# Patient Record
Sex: MALE | Race: WHITE | NOT HISPANIC OR LATINO | Employment: FULL TIME | URBAN - METROPOLITAN AREA
[De-identification: names, ages, dates, MRNs, and addresses within clinical notes are randomized per-mention and may not be internally consistent; named-entity substitution may affect disease eponyms.]

---

## 2017-04-26 LAB — HBA1C MFR BLD: 7.8 % (ref 4.8–5.6)

## 2017-12-27 LAB — HBA1C MFR BLD: 8.6 % (ref 4.8–5.6)

## 2018-04-03 ENCOUNTER — TRANSFERRED RECORDS (OUTPATIENT)
Dept: HEALTH INFORMATION MANAGEMENT | Facility: CLINIC | Age: 63
End: 2018-04-03

## 2018-04-03 LAB — HBA1C MFR BLD: 10.5 % (ref 4.8–5.6)

## 2018-04-17 ENCOUNTER — HOSPITAL ENCOUNTER (INPATIENT)
Facility: CLINIC | Age: 63
LOS: 14 days | Discharge: HOME | DRG: 885 | End: 2018-05-01
Attending: PSYCHIATRY & NEUROLOGY | Admitting: PSYCHIATRY & NEUROLOGY
Payer: COMMERCIAL

## 2018-04-17 DIAGNOSIS — F33.2 SEVERE RECURRENT MAJOR DEPRESSION WITHOUT PSYCHOTIC FEATURES (H): Primary | ICD-10-CM

## 2018-04-17 DIAGNOSIS — F32.A ANXIETY AND DEPRESSION: ICD-10-CM

## 2018-04-17 DIAGNOSIS — F41.9 ANXIETY AND DEPRESSION: ICD-10-CM

## 2018-04-17 DIAGNOSIS — E11.8 TYPE 2 DIABETES MELLITUS WITH COMPLICATION, WITHOUT LONG-TERM CURRENT USE OF INSULIN (H): ICD-10-CM

## 2018-04-17 LAB
ANION GAP SERPL CALCULATED.3IONS-SCNC: 11 MMOL/L (ref 3–14)
BASOPHILS # BLD AUTO: 0 10E9/L (ref 0–0.2)
BASOPHILS NFR BLD AUTO: 0.2 %
BUN SERPL-MCNC: 7 MG/DL (ref 7–30)
CALCIUM SERPL-MCNC: 9 MG/DL (ref 8.5–10.1)
CHLORIDE SERPL-SCNC: 101 MMOL/L (ref 94–109)
CO2 SERPL-SCNC: 23 MMOL/L (ref 20–32)
CREAT SERPL-MCNC: 0.66 MG/DL (ref 0.66–1.25)
DIFFERENTIAL METHOD BLD: ABNORMAL
EOSINOPHIL # BLD AUTO: 0.1 10E9/L (ref 0–0.7)
EOSINOPHIL NFR BLD AUTO: 0.5 %
ERYTHROCYTE [DISTWIDTH] IN BLOOD BY AUTOMATED COUNT: 12.7 % (ref 10–15)
GFR SERPL CREATININE-BSD FRML MDRD: >90 ML/MIN/1.7M2
GLUCOSE SERPL-MCNC: 233 MG/DL (ref 70–99)
HBA1C MFR BLD: 9.9 % (ref 0–6.4)
HCT VFR BLD AUTO: 44.7 % (ref 40–53)
HGB BLD-MCNC: 16.2 G/DL (ref 13.3–17.7)
IMM GRANULOCYTES # BLD: 0 10E9/L (ref 0–0.4)
IMM GRANULOCYTES NFR BLD: 0.3 %
LYMPHOCYTES # BLD AUTO: 2.2 10E9/L (ref 0.8–5.3)
LYMPHOCYTES NFR BLD AUTO: 15.5 %
MCH RBC QN AUTO: 32.3 PG (ref 26.5–33)
MCHC RBC AUTO-ENTMCNC: 36.2 G/DL (ref 31.5–36.5)
MCV RBC AUTO: 89 FL (ref 78–100)
MONOCYTES # BLD AUTO: 1 10E9/L (ref 0–1.3)
MONOCYTES NFR BLD AUTO: 6.9 %
NEUTROPHILS # BLD AUTO: 10.7 10E9/L (ref 1.6–8.3)
NEUTROPHILS NFR BLD AUTO: 76.6 %
NRBC # BLD AUTO: 0 10*3/UL
NRBC BLD AUTO-RTO: 0 /100
PLATELET # BLD AUTO: 229 10E9/L (ref 150–450)
POTASSIUM SERPL-SCNC: 4.1 MMOL/L (ref 3.4–5.3)
RBC # BLD AUTO: 5.02 10E12/L (ref 4.4–5.9)
SODIUM SERPL-SCNC: 135 MMOL/L (ref 133–144)
TSH SERPL DL<=0.005 MIU/L-ACNC: 3.31 MU/L (ref 0.4–4)
WBC # BLD AUTO: 14 10E9/L (ref 4–11)

## 2018-04-17 PROCEDURE — 83036 HEMOGLOBIN GLYCOSYLATED A1C: CPT | Performed by: PSYCHIATRY & NEUROLOGY

## 2018-04-17 PROCEDURE — 84443 ASSAY THYROID STIM HORMONE: CPT | Performed by: PSYCHIATRY & NEUROLOGY

## 2018-04-17 PROCEDURE — 25000132 ZZH RX MED GY IP 250 OP 250 PS 637: Performed by: INTERNAL MEDICINE

## 2018-04-17 PROCEDURE — 93010 ELECTROCARDIOGRAM REPORT: CPT | Performed by: INTERNAL MEDICINE

## 2018-04-17 PROCEDURE — 12400006 ZZH R&B MH INTERMEDIATE

## 2018-04-17 PROCEDURE — 80048 BASIC METABOLIC PNL TOTAL CA: CPT | Performed by: PSYCHIATRY & NEUROLOGY

## 2018-04-17 PROCEDURE — 85025 COMPLETE CBC W/AUTO DIFF WBC: CPT | Performed by: PSYCHIATRY & NEUROLOGY

## 2018-04-17 PROCEDURE — 25000132 ZZH RX MED GY IP 250 OP 250 PS 637: Performed by: PSYCHIATRY & NEUROLOGY

## 2018-04-17 PROCEDURE — 93005 ELECTROCARDIOGRAM TRACING: CPT

## 2018-04-17 PROCEDURE — 36415 COLL VENOUS BLD VENIPUNCTURE: CPT | Performed by: PSYCHIATRY & NEUROLOGY

## 2018-04-17 RX ORDER — QUETIAPINE FUMARATE 200 MG/1
200-400 TABLET, FILM COATED ORAL AT BEDTIME
Status: DISCONTINUED | OUTPATIENT
Start: 2018-04-17 | End: 2018-05-01 | Stop reason: HOSPADM

## 2018-04-17 RX ORDER — LEVOTHYROXINE SODIUM 75 UG/1
75 TABLET ORAL DAILY
Status: DISCONTINUED | OUTPATIENT
Start: 2018-04-18 | End: 2018-05-01 | Stop reason: HOSPADM

## 2018-04-17 RX ORDER — LIRAGLUTIDE 6 MG/ML
1.6 INJECTION SUBCUTANEOUS DAILY
Status: ON HOLD | COMMUNITY
End: 2018-04-19

## 2018-04-17 RX ORDER — ONDANSETRON 4 MG/1
4 TABLET, ORALLY DISINTEGRATING ORAL EVERY 8 HOURS PRN
Status: DISCONTINUED | OUTPATIENT
Start: 2018-04-17 | End: 2018-05-01 | Stop reason: HOSPADM

## 2018-04-17 RX ORDER — IRBESARTAN 150 MG/1
150 TABLET ORAL DAILY
Status: DISCONTINUED | OUTPATIENT
Start: 2018-04-18 | End: 2018-05-01 | Stop reason: HOSPADM

## 2018-04-17 RX ORDER — NICOTINE POLACRILEX 4 MG
15-30 LOZENGE BUCCAL
Status: DISCONTINUED | OUTPATIENT
Start: 2018-04-17 | End: 2018-05-01 | Stop reason: HOSPADM

## 2018-04-17 RX ORDER — ATORVASTATIN CALCIUM 40 MG/1
40 TABLET, FILM COATED ORAL DAILY
Status: DISCONTINUED | OUTPATIENT
Start: 2018-04-18 | End: 2018-05-01 | Stop reason: HOSPADM

## 2018-04-17 RX ORDER — MIRTAZAPINE 45 MG/1
45 TABLET, FILM COATED ORAL AT BEDTIME
Status: DISCONTINUED | OUTPATIENT
Start: 2018-04-17 | End: 2018-05-01 | Stop reason: HOSPADM

## 2018-04-17 RX ORDER — PAROXETINE 20 MG/1
20 TABLET, FILM COATED ORAL DAILY
Status: DISCONTINUED | OUTPATIENT
Start: 2018-04-18 | End: 2018-04-24

## 2018-04-17 RX ORDER — ESZOPICLONE 3 MG/1
3 TABLET, FILM COATED ORAL AT BEDTIME
Status: DISCONTINUED | OUTPATIENT
Start: 2018-04-17 | End: 2018-05-01 | Stop reason: HOSPADM

## 2018-04-17 RX ORDER — TRAZODONE HYDROCHLORIDE 50 MG/1
50 TABLET, FILM COATED ORAL AT BEDTIME
Status: DISCONTINUED | OUTPATIENT
Start: 2018-04-17 | End: 2018-05-01 | Stop reason: HOSPADM

## 2018-04-17 RX ORDER — DEXTROSE MONOHYDRATE 25 G/50ML
25-50 INJECTION, SOLUTION INTRAVENOUS
Status: DISCONTINUED | OUTPATIENT
Start: 2018-04-17 | End: 2018-05-01 | Stop reason: HOSPADM

## 2018-04-17 RX ORDER — PHENOBARBITAL 32.4 MG/1
32.4 TABLET ORAL ONCE
Status: COMPLETED | OUTPATIENT
Start: 2018-04-17 | End: 2018-04-17

## 2018-04-17 RX ORDER — IBUPROFEN 400 MG/1
800 TABLET, FILM COATED ORAL 3 TIMES DAILY PRN
Status: DISCONTINUED | OUTPATIENT
Start: 2018-04-17 | End: 2018-05-01 | Stop reason: HOSPADM

## 2018-04-17 RX ORDER — POLYETHYLENE GLYCOL 3350 17 G
4-8 POWDER IN PACKET (EA) ORAL
Status: DISCONTINUED | OUTPATIENT
Start: 2018-04-17 | End: 2018-05-01 | Stop reason: HOSPADM

## 2018-04-17 RX ADMIN — PHENOBARBITAL 32.4 MG: 32.4 TABLET ORAL at 22:46

## 2018-04-17 RX ADMIN — MIRTAZAPINE 45 MG: 45 TABLET, FILM COATED ORAL at 23:43

## 2018-04-17 RX ADMIN — TRAZODONE HYDROCHLORIDE 50 MG: 50 TABLET ORAL at 23:43

## 2018-04-17 RX ADMIN — ESZOPICLONE 3 MG: 3 TABLET, FILM COATED ORAL at 23:43

## 2018-04-17 RX ADMIN — METFORMIN HYDROCHLORIDE 1000 MG: 500 TABLET, FILM COATED ORAL at 23:43

## 2018-04-17 RX ADMIN — QUETIAPINE FUMARATE 400 MG: 200 TABLET ORAL at 23:43

## 2018-04-17 ASSESSMENT — ACTIVITIES OF DAILY LIVING (ADL)
GROOMING: INDEPENDENT
LAUNDRY: WITH SUPERVISION
DRESS: SCRUBS (BEHAVIORAL HEALTH)
ORAL_HYGIENE: INDEPENDENT

## 2018-04-17 NOTE — IP AVS SNAPSHOT
[place] [address] [place] [address]    Phone:  [phone]                                       After Visit Summary   4/17/2018    Tanner Villatoro    MRN: [id number]           After Visit Summary Signature Page     I have received my discharge instructions, and my questions have been answered. I have discussed any challenges I see with this plan with the nurse or doctor.    ..........................................................................................................................................  Patient/Patient Representative Signature      ..........................................................................................................................................  Patient Representative Print Name and Relationship to Patient    ..................................................               ................................................  Date                                            Time    ..........................................................................................................................................  Reviewed by Signature/Title    ...................................................              ..............................................  Date                                                            Time

## 2018-04-17 NOTE — IP AVS SNAPSHOT
"                  MRN:[id number]                      After Visit Summary   4/17/2018    Tanner Villatoro    MRN: [id number]           Thank you!     Thank you for choosing [facility] for your care. Our goal is always to provide you with excellent care.        Patient Information     Date Of Birth          1955        Designated Caregiver       Most Recent Value    Caregiver    Will someone help with your care after discharge? no      About your hospital stay     You were admitted on:  April 17, 2018 You last received care in the:  [facility]    You were discharged on:  May 1, 2018       Who to Call     For medical emergencies, please call 911.  For non-urgent questions about your medical care, please call your primary care provider or clinic, [phone]          Attending Provider     Provider Specialty    Irving Pappas MD Psychiatry       Primary Care Provider Office Phone # Fax #    Shaun Guthrie [phone]      Follow-up Appointments     Follow-up and recommended labs and tests        Call to schedule follow up with primary care provider, Shaun Guthrie, to occur within 1 month of discharge to review diabetes medications and assess whether increased dose or additional agent needed. Remind provider that you need \"sliding scale insulin\" while hospitalized despite being on Victoza and metformin.  Follow up with the dentist as soon as possible regarding possible dental infection.    Consider PFT (pulmonary function tests) and Sleep study to assess for possible obstructive sleep apnea and COPD                  Further instructions from your care team       Call to schedule follow up appointment with Dr. Guthrie- phone # [phone]-Any referral should go through your primary care doctor within 1 month of hospital discharge in order to discuss diabetes management. Inform that you required supplemental insulin while hospitalized despite continuing metformin and " Victoza. Metformin has been increased to bid. Please inform your Primary of this and keep track of your blood sugars and relay this to Dr. Guthrie    Also recommended is that you schedule a sleep study and Pulmonary FunctionTest as outpatient as outpatient following bout with oxygenation difficulties. Referral would need to go through Dr. Guthrie (see above). Number for [facility] is .    Behavioral Discharge Planning and Instructions    Summary: Admitted to hospital with worsening anxiety and depression and inability to function in everyday life    Main Diagnosis: Major depression; Panic disorder without agoraphobia; Sedative dependence; opiate use disorder, in remission     Major Treatments, Procedures and Findings: psychiatric assessment; Phenobarb taper from benzodiazepines; Series of electro-convulsive therapy treatments.   Final treatment to be completed as outpatient on 4/30/18. Do not drive for at least one week following your last ECT treatment. If you have lingering memory issues or impaired judgment, do not drive until you have been cleared to do so by your physician.     Symptoms to Report: mood getting worse or thoughts of suicide    Lifestyle Adjustment: Sober lifestyle recommended. Follow up with AA and get a sponsor. Intensive outpatient Program recommended to learn anxiety and depression management skills.Complete and follow safety plan.    Psychiatry Follow-up:     Dr. Pappas - appointment on Friday 5/11/18 @1:45 pm.       [facility]  [address], [address]  [place]  [address]  Phone:  [phone]  Fax:  [phone]    [facility] program- intake on Monday 5/7/18 with Gita Shirley. Arrive @ 8:45 for paperwork. Interview @ 9:15  [facility]  [address], [address]  [place]  [address]  Phone:  [phone]  Fax:  [phone]    Resources:     [facility] Services-   Crisis  "Intervention: 394.189.2772 or 831-820-3209 (TTY: 167.933.8903).  Call anytime for help.  [place] on Mental Illness (www.[place].foreign.org): 492.844.1298 or 200-082-2672.  Alcoholics Anonymous (www.alcoholics-anonymous.org): Check your phone book for your local chapter.  National Suicide Prevention Line (www.mentalhealthmn.org): 886-423-ALWZ (7848)    General Medication Instructions:   See your medication sheet(s) for instructions.   Take all medicines as directed.  Make no changes unless your doctor suggests them.   Go to all your doctor visits.  Be sure to have all your required lab tests. This way, your medicines can be refilled on time.  Do not use any drugs not prescribed by your doctor.  Avoid alcohol.      Pending Results     No orders found from 4/15/2018 to 4/18/2018.            Statement of Approval     Ordered          05/01/18 1053  I have reviewed and agree with all the recommendations and orders detailed in this document.  EFFECTIVE NOW     Approved and electronically signed by:  Irving Pappas MD             Admission Information     Date & Time Provider Department Dept. Phone    4/17/2018 Irving Pappas MD [facility] [phone]      Your Vitals Were     Blood Pressure Pulse Temperature Respirations Height Weight    134/82 73 98.4  F (36.9  C) (Oral) 16 1.778 m (5' 10\") 101.2 kg (223 lb 1.6 oz)    Pulse Oximetry BMI (Body Mass Index)                91% 32.01 kg/m2          Eponym Information     Eponym lets you send messages to your doctor, view your test results, renew your prescriptions, schedule appointments and more. To sign up, go to [email or web address] . Click on \"Log in\" on the left side of the screen, which will take you to the Welcome page. Then click on \"Sign up Now\" on the right side of the page.     You will be asked to enter the access code listed below, as well as some personal information. Please follow the directions to create your " username and password.     Your access code is: [email or web address]  Expires: 2018  3:21 PM     Your access code will  in 90 days. If you need help or a new code, please call your [facility] clinic or [phone].        Care EveryWhere ID     This is your Care EveryWhere ID. This could be used by other organizations to access your [facility] medical records  [id number]        Equal Access to Services     MANJU SHEETS : Hadii hemalatha wolfe hadasho Soomaali, waaxda luqadaha, qaybta kaalmada adeegyada, humberto leroyin hayaletha tsetomerluis alberto lai . So [facility] [phone].    ATENCIÓN: Si habla español, tiene a [place] disposición servicios gratuitos de asistencia lingüística. [facility] [phone].    We comply with applicable federal civil rights laws and [place] laws. We do not discriminate on the basis of race, color, national origin, age, disability, sex, sexual orientation, or gender identity.               Review of your medicines      START taking        Dose / Directions    hydrOXYzine 50 MG tablet   Commonly known as:  ATARAX   Used for:  Severe recurrent major depression without psychotic features (H)        Dose:  50 mg   Take 1 tablet (50 mg) by mouth 2 times daily as needed for anxiety   Quantity:  60 tablet   Refills:  0         CONTINUE these medicines which may have CHANGED, or have new prescriptions. If we are uncertain of the size of tablets/capsules you have at home, strength may be listed as something that might have changed.        Dose / Directions    metFORMIN 1000 MG tablet   Commonly known as:  GLUCOPHAGE   Indication:  Type 2 Diabetes   This may have changed:    - medication strength  - when to take this   Used for:  Type 2 diabetes mellitus with complication, without long-term current use of insulin (H)        Dose:  1000 mg   Take 1 tablet (1,000 mg) by mouth 2 times daily (with meals)   Quantity:  60 tablet   Refills:  0       PARoxetine 10 MG tablet   Commonly known as:  PAXIL   This may have changed:     - medication strength  - how much to take   Used for:  Severe recurrent major depression without psychotic features (H)        Dose:  10 mg   Start taking on:  5/2/2018   Take 1 tablet (10 mg) by mouth daily   Refills:  0       * SEROQUEL PO   This may have changed:  Another medication with the same name was added. Make sure you understand how and when to take each.        Dose:  200-400 mg   Take 200-400 mg by mouth At Bedtime   Refills:  0       * QUEtiapine 50 MG tablet   Commonly known as:  SEROquel   This may have changed:  You were already taking a medication with the same name, and this prescription was added. Make sure you understand how and when to take each.   Used for:  Severe recurrent major depression without psychotic features (H)        Dose:  50 mg   Take 1 tablet (50 mg) by mouth 2 times daily as needed (anxiety)   Quantity:  60 tablet   Refills:  0       VICTOZA PEN 18 MG/3ML soln   Indication:  Type 2 Diabetes   This may have changed:  how much to take   Generic drug:  liraglutide        Dose:  1.2 mg   Inject 1.2 mg Subcutaneous daily   Refills:  0       * Notice:  This list has 2 medication(s) that are the same as other medications prescribed for you. Read the directions carefully, and ask your doctor or other care provider to review them with you.      CONTINUE these medicines which have NOT CHANGED        Dose / Directions    ASPIRIN PO        Dose:  81 mg   Take 81 mg by mouth daily   Refills:  0       AVAPRO PO        Dose:  150 mg   Take 150 mg by mouth daily   Refills:  0       IBUPROFEN PO        Dose:  800 mg   Take 800 mg by mouth 3 times daily as needed for moderate pain   Refills:  0       LEVOTHYROXINE SODIUM PO        Dose:  75 mcg   Take 75 mcg by mouth daily.   Refills:  0       LIPITOR PO        Dose:  40 mg   Take 40 mg by mouth daily.   Refills:  0       LUNESTA PO        Dose:  3 mg   Take 3 mg by mouth At Bedtime   Refills:  0       REMERON PO        Dose:  45 mg   Take 45  mg by mouth At Bedtime   Refills:  0       TRAZODONE HCL PO        Dose:  50 mg   Take 50 mg by mouth At Bedtime   Refills:  0       TRINTELLIX 20 MG tablet   Generic drug:  vortioxetine        Dose:  20 mg   Take 20 mg by mouth daily   Refills:  0       ZOFRAN ODT PO        Dose:  4 mg   Take 4 mg by mouth every 8 hours as needed for nausea or vomiting   Refills:  0            Where to get your medicines      These medications were sent to [facility] - [address]  [address], [place] [address]     Phone:  [phone]     hydrOXYzine 50 MG tablet    metFORMIN 1000 MG tablet    QUEtiapine 50 MG tablet         Some of these will need a paper prescription and others can be bought over the counter. Ask your nurse if you have questions.     You don't need a prescription for these medications     PARoxetine 10 MG tablet                Protect others around you: Learn how to safely use, store and throw away your medicines at www.disposemymeds.org.             Medication List: This is a list of all your medications and when to take them. Check marks below indicate your daily home schedule. Keep this list as a reference.      Medications           Morning Afternoon Evening Bedtime As Needed    ASPIRIN PO   Take 81 mg by mouth daily                                AVAPRO PO   Take 150 mg by mouth daily   Last time this was given:  150 mg on 5/1/2018  8:38 AM                                   hydrOXYzine 50 MG tablet   Commonly known as:  ATARAX   Take 1 tablet (50 mg) by mouth 2 times daily as needed for anxiety   Last time this was given:  50 mg on 5/1/2018  1:34 PM                                   IBUPROFEN PO   Take 800 mg by mouth 3 times daily as needed for moderate pain   Last time this was given:  800 mg on 5/1/2018  1:33 PM                                   LEVOTHYROXINE SODIUM PO   Take 75 mcg by mouth daily.   Last time this was given:  75 mcg on 5/1/2018  8:38 AM                                    LIPITOR PO   Take 40 mg by mouth daily.   Last time this was given:  40 mg on 5/1/2018  8:38 AM                                   LUNESTA PO   Take 3 mg by mouth At Bedtime   Last time this was given:  3 mg on 4/30/2018  9:15 PM                                   metFORMIN 1000 MG tablet   Commonly known as:  GLUCOPHAGE   Take 1 tablet (1,000 mg) by mouth 2 times daily (with meals)   Last time this was given:  1,000 mg on 5/1/2018  8:38 AM                                PARoxetine 10 MG tablet   Commonly known as:  PAXIL   Take 1 tablet (10 mg) by mouth daily   Start taking on:  5/2/2018   Last time this was given:  10 mg on 5/1/2018  8:38 AM                                   REMERON PO   Take 45 mg by mouth At Bedtime   Last time this was given:  45 mg on 4/30/2018  9:15 PM                                   * SEROQUEL PO   Take 200-400 mg by mouth At Bedtime   Last time this was given:  50 mg on 5/1/2018  1:33 PM                                * QUEtiapine 50 MG tablet   Commonly known as:  SEROquel   Take 1 tablet (50 mg) by mouth 2 times daily as needed (anxiety)   Last time this was given:  50 mg on 5/1/2018  1:33 PM                                   TRAZODONE HCL PO   Take 50 mg by mouth At Bedtime   Last time this was given:  50 mg on 4/30/2018  9:15 PM                                   TRINTELLIX 20 MG tablet   Take 20 mg by mouth daily   Last time this was given:  20 mg on 5/1/2018  8:38 AM   Generic drug:  vortioxetine                                   VICTOZA PEN 18 MG/3ML soln   Inject 1.2 mg Subcutaneous daily   Last time this was given:  1.2 mg on 4/30/2018  5:18 PM   Generic drug:  liraglutide                                   ZOFRAN ODT PO   Take 4 mg by mouth every 8 hours as needed for nausea or vomiting                                   * Notice:  This list has 2 medication(s) that are the same as other medications prescribed for you. Read the directions carefully, and  ask your doctor or other care provider to review them with you.

## 2018-04-18 ENCOUNTER — ANESTHESIA (OUTPATIENT)
Dept: SURGERY | Facility: CLINIC | Age: 63
End: 2018-04-18

## 2018-04-18 ENCOUNTER — ANESTHESIA EVENT (OUTPATIENT)
Dept: SURGERY | Facility: CLINIC | Age: 63
End: 2018-04-18

## 2018-04-18 LAB
GLUCOSE BLDC GLUCOMTR-MCNC: 163 MG/DL (ref 70–99)
GLUCOSE BLDC GLUCOMTR-MCNC: 204 MG/DL (ref 70–99)
GLUCOSE BLDC GLUCOMTR-MCNC: 230 MG/DL (ref 70–99)
GLUCOSE BLDC GLUCOMTR-MCNC: 258 MG/DL (ref 70–99)
GLUCOSE BLDC GLUCOMTR-MCNC: 284 MG/DL (ref 70–99)
GLUCOSE BLDC GLUCOMTR-MCNC: 339 MG/DL (ref 70–99)

## 2018-04-18 PROCEDURE — 25000128 H RX IP 250 OP 636: Performed by: NURSE ANESTHETIST, CERTIFIED REGISTERED

## 2018-04-18 PROCEDURE — 99221 1ST HOSP IP/OBS SF/LOW 40: CPT | Performed by: NURSE PRACTITIONER

## 2018-04-18 PROCEDURE — 25000128 H RX IP 250 OP 636: Performed by: SURGERY

## 2018-04-18 PROCEDURE — 25000128 H RX IP 250 OP 636: Performed by: PSYCHIATRY & NEUROLOGY

## 2018-04-18 PROCEDURE — 90870 ELECTROCONVULSIVE THERAPY: CPT

## 2018-04-18 PROCEDURE — 37000008 ZZH ANESTHESIA TECHNICAL FEE, 1ST 30 MIN

## 2018-04-18 PROCEDURE — 25000132 ZZH RX MED GY IP 250 OP 250 PS 637: Performed by: PSYCHIATRY & NEUROLOGY

## 2018-04-18 PROCEDURE — 25000125 ZZHC RX 250: Performed by: NURSE ANESTHETIST, CERTIFIED REGISTERED

## 2018-04-18 PROCEDURE — 40000010 ZZH STATISTIC ANES STAT CODE-CRNA PER MINUTE

## 2018-04-18 PROCEDURE — 25000131 ZZH RX MED GY IP 250 OP 636 PS 637: Performed by: INTERNAL MEDICINE

## 2018-04-18 PROCEDURE — 25000125 ZZHC RX 250: Performed by: SURGERY

## 2018-04-18 PROCEDURE — 25000132 ZZH RX MED GY IP 250 OP 250 PS 637: Performed by: INTERNAL MEDICINE

## 2018-04-18 PROCEDURE — 90853 GROUP PSYCHOTHERAPY: CPT

## 2018-04-18 PROCEDURE — 99207 ZZC CDG-HISTORY COMP: MEETS EXP. PROB FOCUSED- DOWN CODED LACK OF PFSH: CPT | Performed by: NURSE PRACTITIONER

## 2018-04-18 PROCEDURE — 25000132 ZZH RX MED GY IP 250 OP 250 PS 637: Performed by: HOSPITALIST

## 2018-04-18 PROCEDURE — 00000146 ZZHCL STATISTIC GLUCOSE BY METER IP

## 2018-04-18 PROCEDURE — 12400006 ZZH R&B MH INTERMEDIATE

## 2018-04-18 PROCEDURE — 25000131 ZZH RX MED GY IP 250 OP 636 PS 637: Performed by: NURSE ANESTHETIST, CERTIFIED REGISTERED

## 2018-04-18 RX ORDER — ONDANSETRON 4 MG/1
4 TABLET, ORALLY DISINTEGRATING ORAL EVERY 30 MIN PRN
Status: DISCONTINUED | OUTPATIENT
Start: 2018-04-18 | End: 2018-04-18 | Stop reason: HOSPADM

## 2018-04-18 RX ORDER — KETOROLAC TROMETHAMINE 30 MG/ML
30 INJECTION, SOLUTION INTRAMUSCULAR; INTRAVENOUS EVERY 6 HOURS PRN
Status: DISCONTINUED | OUTPATIENT
Start: 2018-04-18 | End: 2018-04-23

## 2018-04-18 RX ORDER — SODIUM CHLORIDE, SODIUM LACTATE, POTASSIUM CHLORIDE, CALCIUM CHLORIDE 600; 310; 30; 20 MG/100ML; MG/100ML; MG/100ML; MG/100ML
INJECTION, SOLUTION INTRAVENOUS CONTINUOUS
Status: DISCONTINUED | OUTPATIENT
Start: 2018-04-18 | End: 2018-04-18 | Stop reason: HOSPADM

## 2018-04-18 RX ORDER — NALOXONE HYDROCHLORIDE 0.4 MG/ML
.1-.4 INJECTION, SOLUTION INTRAMUSCULAR; INTRAVENOUS; SUBCUTANEOUS
Status: DISCONTINUED | OUTPATIENT
Start: 2018-04-18 | End: 2018-04-18 | Stop reason: HOSPADM

## 2018-04-18 RX ORDER — LIRAGLUTIDE 6 MG/ML
1.6 INJECTION SUBCUTANEOUS DAILY
Status: DISCONTINUED | OUTPATIENT
Start: 2018-04-18 | End: 2018-04-18

## 2018-04-18 RX ORDER — PHENOBARBITAL 32.4 MG/1
32.4 TABLET ORAL 3 TIMES DAILY
Status: DISCONTINUED | OUTPATIENT
Start: 2018-04-18 | End: 2018-04-23

## 2018-04-18 RX ORDER — ONDANSETRON 2 MG/ML
4 INJECTION INTRAMUSCULAR; INTRAVENOUS EVERY 30 MIN PRN
Status: DISCONTINUED | OUTPATIENT
Start: 2018-04-18 | End: 2018-04-18 | Stop reason: HOSPADM

## 2018-04-18 RX ORDER — ESMOLOL HYDROCHLORIDE 10 MG/ML
INJECTION INTRAVENOUS PRN
Status: DISCONTINUED | OUTPATIENT
Start: 2018-04-18 | End: 2018-04-18

## 2018-04-18 RX ORDER — MEPERIDINE HYDROCHLORIDE 25 MG/ML
12.5 INJECTION INTRAMUSCULAR; INTRAVENOUS; SUBCUTANEOUS
Status: DISCONTINUED | OUTPATIENT
Start: 2018-04-18 | End: 2018-04-18 | Stop reason: HOSPADM

## 2018-04-18 RX ORDER — LABETALOL HYDROCHLORIDE 5 MG/ML
INJECTION, SOLUTION INTRAVENOUS PRN
Status: DISCONTINUED | OUTPATIENT
Start: 2018-04-18 | End: 2018-04-18

## 2018-04-18 RX ORDER — SODIUM CHLORIDE, SODIUM LACTATE, POTASSIUM CHLORIDE, CALCIUM CHLORIDE 600; 310; 30; 20 MG/100ML; MG/100ML; MG/100ML; MG/100ML
INJECTION, SOLUTION INTRAVENOUS ONCE
Status: COMPLETED | OUTPATIENT
Start: 2018-04-18 | End: 2018-04-18

## 2018-04-18 RX ADMIN — ATORVASTATIN CALCIUM 40 MG: 40 TABLET, FILM COATED ORAL at 07:58

## 2018-04-18 RX ADMIN — NICOTINE POLACRILEX 8 MG: 4 LOZENGE ORAL at 16:50

## 2018-04-18 RX ADMIN — ESMOLOL HYDROCHLORIDE 20 MG: 10 INJECTION, SOLUTION INTRAVENOUS at 06:57

## 2018-04-18 RX ADMIN — INSULIN ASPART 3 UNITS: 100 INJECTION, SOLUTION INTRAVENOUS; SUBCUTANEOUS at 09:27

## 2018-04-18 RX ADMIN — VORTIOXETINE 20 MG: 20 TABLET, FILM COATED ORAL at 07:58

## 2018-04-18 RX ADMIN — MIRTAZAPINE 45 MG: 45 TABLET, FILM COATED ORAL at 22:00

## 2018-04-18 RX ADMIN — IBUPROFEN 800 MG: 400 TABLET ORAL at 18:07

## 2018-04-18 RX ADMIN — IBUPROFEN 800 MG: 400 TABLET ORAL at 12:57

## 2018-04-18 RX ADMIN — KETOROLAC TROMETHAMINE 30 MG: 30 INJECTION, SOLUTION INTRAMUSCULAR at 07:23

## 2018-04-18 RX ADMIN — INSULIN ASPART 2 UNITS: 100 INJECTION, SOLUTION INTRAVENOUS; SUBCUTANEOUS at 17:35

## 2018-04-18 RX ADMIN — LEVOTHYROXINE SODIUM 75 MCG: 75 TABLET ORAL at 07:58

## 2018-04-18 RX ADMIN — SODIUM CHLORIDE, POTASSIUM CHLORIDE, SODIUM LACTATE AND CALCIUM CHLORIDE: 600; 310; 30; 20 INJECTION, SOLUTION INTRAVENOUS at 06:17

## 2018-04-18 RX ADMIN — METFORMIN HYDROCHLORIDE 1000 MG: 500 TABLET, FILM COATED ORAL at 17:35

## 2018-04-18 RX ADMIN — PHENOBARBITAL 32.4 MG: 32.4 TABLET ORAL at 21:05

## 2018-04-18 RX ADMIN — NICOTINE POLACRILEX 8 MG: 4 LOZENGE ORAL at 11:28

## 2018-04-18 RX ADMIN — NICOTINE POLACRILEX 8 MG: 4 LOZENGE ORAL at 20:03

## 2018-04-18 RX ADMIN — Medication 100 MG: at 06:50

## 2018-04-18 RX ADMIN — PHENOBARBITAL 32.4 MG: 32.4 TABLET ORAL at 15:52

## 2018-04-18 RX ADMIN — QUETIAPINE FUMARATE 400 MG: 200 TABLET ORAL at 22:00

## 2018-04-18 RX ADMIN — PAROXETINE HYDROCHLORIDE 20 MG: 20 TABLET, FILM COATED ORAL at 07:58

## 2018-04-18 RX ADMIN — SUCCINYLCHOLINE CHLORIDE 100 MG: 20 INJECTION, SOLUTION INTRAMUSCULAR; INTRAVENOUS; PARENTERAL at 06:50

## 2018-04-18 RX ADMIN — TRAZODONE HYDROCHLORIDE 50 MG: 50 TABLET ORAL at 22:00

## 2018-04-18 RX ADMIN — IRBESARTAN 150 MG: 150 TABLET ORAL at 07:58

## 2018-04-18 RX ADMIN — NICOTINE POLACRILEX 4 MG: 4 LOZENGE ORAL at 18:09

## 2018-04-18 RX ADMIN — NICOTINE POLACRILEX 8 MG: 4 LOZENGE ORAL at 15:00

## 2018-04-18 RX ADMIN — ESZOPICLONE 3 MG: 3 TABLET, FILM COATED ORAL at 22:00

## 2018-04-18 RX ADMIN — LABETALOL HYDROCHLORIDE 15 MG: 5 INJECTION INTRAVENOUS at 06:55

## 2018-04-18 RX ADMIN — SODIUM CHLORIDE, POTASSIUM CHLORIDE, SODIUM LACTATE AND CALCIUM CHLORIDE: 600; 310; 30; 20 INJECTION, SOLUTION INTRAVENOUS at 06:48

## 2018-04-18 RX ADMIN — NICOTINE POLACRILEX 8 MG: 4 LOZENGE ORAL at 22:00

## 2018-04-18 RX ADMIN — INSULIN ASPART 4 UNITS: 100 INJECTION, SOLUTION INTRAVENOUS; SUBCUTANEOUS at 12:25

## 2018-04-18 RX ADMIN — AMOXICILLIN AND CLAVULANATE POTASSIUM 1 TABLET: 875; 125 TABLET, FILM COATED ORAL at 07:58

## 2018-04-18 RX ADMIN — LIDOCAINE HYDROCHLORIDE 0.5 MG: 10 INJECTION, SOLUTION EPIDURAL; INFILTRATION; INTRACAUDAL; PERINEURAL at 06:17

## 2018-04-18 RX ADMIN — NICOTINE POLACRILEX 8 MG: 4 LOZENGE ORAL at 21:06

## 2018-04-18 RX ADMIN — AMOXICILLIN AND CLAVULANATE POTASSIUM 1 TABLET: 875; 125 TABLET, FILM COATED ORAL at 21:05

## 2018-04-18 RX ADMIN — NICOTINE POLACRILEX 8 MG: 4 LOZENGE ORAL at 13:02

## 2018-04-18 ASSESSMENT — LIFESTYLE VARIABLES: TOBACCO_USE: 1

## 2018-04-18 NOTE — H&P
[facility]    History and Physical  Hospitalist       Date of Admission:  4/17/2018    Assessment & Plan   Tanner Villatoro is a 63 year old male who presents with increasing anxiety, and depression. The patient has a PMH of depression, anxiety, substance abuse, HTN, DM2, HLD and hypothyroidism.    Depression  Anxiety  History of Substance Abuse: The patient is currently treated by Dr. Pappas and was directly admitted from his clinic for worsening depression and anxiety. He has a history of suicide attempts, but denies a plan or SI now. He has a history of opioid addiction, but has been weaned off using lorazepam by his PCP and has not had opiates in 6-7 weeks. History of ETOH abuse but has not had a drink since 1988. 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 PTA medications include Lunesta, Remeron, Paxil, Seroquel, Trintellix and trazodone. The plan is for ECT treatment on April 19, 2018.   -- Managed per primary team    Hypertension: Well controlled by PCP on Irbesartan only. BP on admission to the hospital is slightly elevated at 144/98. Per nursing staff, the patient was anxious and tearful on arrival. I recommend this be retaken prior to ECT. EKG obtained prior to ECT, which demonstrates NSR without ST-T changes indicative of ischemia, without QT prolongation. There is TWI in lead III, however, I do not have a comparison. The patient reports having 2 coronary stents in Jan 2010. The patient denies any CP, SOB, exertional chest discomfort, peripheral edema or orthopnea.   -- Continue PTA Irbesartan    Diabetes Type II: Poorly controlled. The patient is on Victoza and Metformin, Hgb A1C is 9.9 today. Glucose here is 133. He is NPO for ECT tomorrow AM. Renal function WNL. The patietn denies any hypoglycemic episodes.  -- Continue QID glucose monitoring with sliding scale  -- Continue metformin for now     Hypothyroidism: Chronic and stable on  levothyroxine 75mcg daily. TSH today 3.31.  -- continue levothyroxine    Leukocytosis with Neutrophilia: The patient arrives with a WBC of 14k. He denies any recent URIs, but endorses ongoing tooth tenderness, and fragility for one year. He has been following with his dentist and has numerous teeth removed. He has an area above the left maxillary canine of tenderness, and suspects he may have a problem with this tooth. He endorses some chills recently, but feels overall well. He is afebrile today and does not demonstrate any signs of toxicity, VS are WNL.   --Follow up with his dentist in the outpatient setting.    I feel it is appropriate for the patient to proceed with his planned ECT treatment. At this time, the Hospitalist Service will peripherally follow by chart checking the patient's glucose levels and BP.     # Pain Assessment:  Current Pain Score 5/24/2012   Pain score (0-10) 5   Pain location -   Pain descriptors Burning   - Tanner is experiencing pain due to chronic back pain. Pain management was discussed and the plan was created in a collaborative fashion.  Tanner's response to the current recommendations: compliant  - Non-pharmacologic adjuvants: Heat and Ice      DVT Prophylaxis: Low Risk/Ambulatory with no VTE prophylaxis indicated  Code Status: Full Code    Disposition: Expected discharge in 2-3 days once cleared by primary team.      ONEYDA Johnson [facility]  Hospitalist Service  Pager: [phone] (7a - 6p)      Primary Care Physician   Shaun Guthrie    Chief Complaint   Anxiety and Depression    History is obtained from the patient    History of Present Illness   Tanner Villatoro is a 63 year old male who presents from Dr. Pappas's clinic for worsening anxiety and depression, scheduled for ECT treatment tomorrow AM.    Past Medical History    I have reviewed this patient's medical history and updated it with pertinent information if needed.   Past Medical History:   Diagnosis Date     Anxiety       Coronary artery disease     stent x 2     Diabetes mellitus (H)     type 2     Headaches      Heart attack      Hyperlipemia      Hypertension      Insomnia, unspecified      Sleep apnea        Past Surgical History   I have reviewed this patient's surgical history and updated it with pertinent information if needed.  Past Surgical History:   Procedure Laterality Date     CARDIAC SURGERY      stent x2     ENDOSCOPIC BALLOON SINUPLASTY ACCLARENT  5/24/2012    Procedure:ENDOSCOPIC BALLOON SINUPLASTY ACCLARENT; BILATERAL ENDOSCOPIC ETHMOIDECTOMY, MAXILLARY ANTROSTOMY, FRONTAL SINUSOSTOMY BILATERAL ; Surgeon:ONI CARRERA; Location:[facility]     GENITOURINARY SURGERY      vasectomy     ORTHOPEDIC SURGERY      ankle cystectomy       Prior to Admission Medications   Prior to Admission Medications   Prescriptions Last Dose Informant Patient Reported? Taking?   ASPIRIN PO 4/16/2018 at Unknown time  Yes Yes   Sig: Take 81 mg by mouth daily    Atorvastatin Calcium (LIPITOR PO) 4/16/2018 at Unknown time  Yes Yes   Sig: Take 40 mg by mouth daily.   Eszopiclone (LUNESTA PO) Past Week at Unknown time  Yes Yes   Sig: Take 3 mg by mouth At Bedtime   IBUPROFEN PO 4/16/2018 at Unknown time  Yes Yes   Sig: Take 800 mg by mouth 3 times daily as needed for moderate pain   Irbesartan (AVAPRO PO) 4/16/2018 at Unknown time  Yes Yes   Sig: Take 150 mg by mouth daily   LEVOTHYROXINE SODIUM PO 4/16/2018 at Unknown time  Yes Yes   Sig: Take 75 mcg by mouth daily.   METFORMIN HCL PO 4/16/2018 at Unknown time  Yes Yes   Sig: Take 1,000 mg by mouth daily (with dinner)    Mirtazapine (REMERON PO) 4/16/2018 at Unknown time  Yes Yes   Sig: Take 45 mg by mouth At Bedtime   Ondansetron (ZOFRAN ODT PO) Past Month at Unknown time  Yes Yes   Sig: Take 4 mg by mouth every 8 hours as needed for nausea or vomiting   PARoxetine HCl (PAXIL PO) 4/17/2018 at Unknown time  Yes Yes   Sig: Take 20 mg by mouth daily    QUEtiapine Fumarate (SEROQUEL PO) 4/16/2018  at Unknown time  Yes Yes   Sig: Take 200-400 mg by mouth At Bedtime   TRAZODONE HCL PO 4/16/2018 at Unknown time  Yes Yes   Sig: Take 50 mg by mouth At Bedtime   liraglutide (VICTOZA PEN) 18 MG/3ML soln 4/16/2018 at Unknown time Self Yes Yes   Sig: Inject 1.6 mg Subcutaneous daily   vortioxetine (TRINTELLIX) 20 MG tablet 4/17/2018 at Unknown time  Yes Yes   Sig: Take 20 mg by mouth daily      Facility-Administered Medications: None     Allergies   No Known Allergies    Social History   I have reviewed this patient's social history and updated it with pertinent information if needed. Tanner Villatoro  reports that he quit smoking about 8 years ago. He does not have any smokeless tobacco history on file. He reports that he does not drink alcohol or use illicit drugs. He has a history of opioid addiction and has not had any opiates in 6-7 weeks.     Family History   I have reviewed this patient's family history and updated it with pertinent information if needed.   No family history on file.    Review of Systems   CONSTITUTIONAL: NEGATIVE for fever, change in weight  CONSTITUTIONAL:POSITIVE  for chills  INTEGUMENTARY/SKIN: NEGATIVE for worrisome rashes, moles or lesions  EYES: NEGATIVE for vision changes or irritation  ENT/MOUTH: NEGATIVE for ear, mouth and throat problems  RESP: NEGATIVE for significant cough or SOB  CV: NEGATIVE for chest pain, palpitations or peripheral edema  GI: NEGATIVE for nausea, abdominal pain, heartburn, or change in bowel habits  : NEGATIVE for frequency, dysuria, or hematuria  MUSCULOSKELETAL: NEGATIVE for significant arthralgias or myalgia  NEURO: NEGATIVE for weakness, dizziness or paresthesias  PSYCHIATRIC: POSITIVE forconcentration difficulty, depressed mood, Hx anxiety, Hx depression, Hx panic attacks, Hx suicide attempts x 1, feelings of worthlessness/guilt, hopelessness, insomnia, panic attack and increased stress    Physical Exam   Temp: 98.8  F (37.1  C) Temp src: Oral BP: (!)  144/98 Pulse: 95   Resp: 18        Vital Signs with Ranges  Temp:  [98.8  F (37.1  C)] 98.8  F (37.1  C)  Pulse:  [95] 95  Resp:  [18] 18  BP: (144)/(98) 144/98  227 lbs 12.8 oz    Constitutional: Awake, alert, cooperative, no apparent distress.  Eyes: Conjunctiva and pupils examined and normal.  HEENT: Moist mucous membranes, dentition is abnormal, multiple missing teeth, no obvious areas of infection, no abnormal odor. There are no areas of erythema, but a mild tenderness to palpation over the left maxillary canine.   Respiratory: Clear to posterior auscultation bilaterally, no crackles or wheezing.  Cardiovascular: Regular rate and rhythm, normal S1 and S2, and no murmur noted.  GI: Soft, non-distended, non-tender, normal bowel sounds.  Skin: Pale, warm and dry.   Musculoskeletal: No joint swelling, erythema or tenderness.  Neurologic: Cranial nerves 2-12 intact, normal strength and sensation.  Psychiatric: Alert, oriented to person, place and time, no obvious anxiety or depression.    Data   Data reviewed today:  I personally reviewed the EKG tracing showing NSR, no ST T wave changes consistent with ischemia, TWI in lead III.    Recent Labs  Lab 04/17/18  2255   WBC 14.0*   HGB 16.2   MCV 89         POTASSIUM 4.1   CHLORIDE 101   CO2 23   BUN 7   CR 0.66   ANIONGAP 11   ROB 9.0   *       Imaging:  No results found for this or any previous visit (from the past 24 hour(s)).

## 2018-04-18 NOTE — ANESTHESIA PREPROCEDURE EVALUATION
Procedure: * No procedures listed *  Preop diagnosis: * No pre-op diagnosis entered *    No Known Allergies  Past Medical History:   Diagnosis Date     Anxiety      Coronary artery disease     stent x 2     Diabetes mellitus (H)     type 2     Headaches      Heart attack      Hyperlipemia      Hypertension      Insomnia, unspecified      Sleep apnea      Past Surgical History:   Procedure Laterality Date     CARDIAC SURGERY      stent x2     ENDOSCOPIC BALLOON SINUPLASTY ACCLARENT  5/24/2012    Procedure:ENDOSCOPIC BALLOON SINUPLASTY ACCLARENT; BILATERAL ENDOSCOPIC ETHMOIDECTOMY, MAXILLARY ANTROSTOMY, FRONTAL SINUSOSTOMY BILATERAL ; Surgeon:ONI CARRERA; Location:[facility]     GENITOURINARY SURGERY      vasectomy     ORTHOPEDIC SURGERY      ankle cystectomy     Social History   Substance Use Topics     Smoking status: Former Smoker     Quit date: 1/15/2010     Smokeless tobacco: Not on file     Alcohol use No     Prior to Admission medications    Medication Sig Start Date End Date Taking? Authorizing Provider   ASPIRIN PO Take 81 mg by mouth daily    Yes Reported, Patient   Atorvastatin Calcium (LIPITOR PO) Take 40 mg by mouth daily.   Yes Reported, Patient   Eszopiclone (LUNESTA PO) Take 3 mg by mouth At Bedtime   Yes Reported, Patient   IBUPROFEN PO Take 800 mg by mouth 3 times daily as needed for moderate pain   Yes Reported, Patient   Irbesartan (AVAPRO PO) Take 150 mg by mouth daily   Yes Reported, Patient   LEVOTHYROXINE SODIUM PO Take 75 mcg by mouth daily.   Yes Reported, Patient   liraglutide (VICTOZA PEN) 18 MG/3ML soln Inject 1.6 mg Subcutaneous daily   Yes Reported, Patient   METFORMIN HCL PO Take 1,000 mg by mouth daily (with dinner)    Yes Reported, Patient   Mirtazapine (REMERON PO) Take 45 mg by mouth At Bedtime   Yes Reported, Patient   Ondansetron (ZOFRAN ODT PO) Take 4 mg by mouth every 8 hours as needed for nausea or vomiting   Yes Reported, Patient   PARoxetine HCl (PAXIL PO) Take 20 mg by mouth  daily    Yes Reported, Patient   QUEtiapine Fumarate (SEROQUEL PO) Take 200-400 mg by mouth At Bedtime   Yes Reported, Patient   TRAZODONE HCL PO Take 50 mg by mouth At Bedtime   Yes Reported, Patient   vortioxetine (TRINTELLIX) 20 MG tablet Take 20 mg by mouth daily   Yes Reported, Patient     Current Facility-Administered Medications Ordered in Epic   Medication Dose Route Frequency Last Rate Last Dose     [Auto Hold] amoxicillin-clavulanate (AUGMENTIN) 875-125 MG per tablet 1 tablet  1 tablet Oral Q12H SHAMAR         [Auto Hold] atorvastatin (LIPITOR) tablet 40 mg  40 mg Oral Daily         [Auto Hold] glucose gel 15-30 g  15-30 g Oral Q15 Min PRN        Or     [Auto Hold] dextrose 50 % injection 25-50 mL  25-50 mL Intravenous Q15 Min PRN        Or     [Auto Hold] glucagon injection 1 mg  1 mg Subcutaneous Q15 Min PRN         [Auto Hold] eszopiclone (LUNESTA) tablet 3 mg  3 mg Oral At Bedtime   3 mg at 04/17/18 2343     [Auto Hold] ibuprofen (ADVIL/MOTRIN) tablet 800 mg  800 mg Oral TID PRN         [Auto Hold] insulin aspart (NovoLOG) inj (RAPID ACTING)  1-7 Units Subcutaneous TID AC         [Auto Hold] insulin aspart (NovoLOG) inj (RAPID ACTING)  1-5 Units Subcutaneous At Bedtime         [Auto Hold] irbesartan (AVAPRO) tablet 150 mg  150 mg Oral Daily         [Auto Hold] levothyroxine (SYNTHROID/LEVOTHROID) tablet 75 mcg  75 mcg Oral Daily         [Auto Hold] metFORMIN (GLUCOPHAGE) tablet 1,000 mg  1,000 mg Oral Daily with supper   1,000 mg at 04/17/18 2343     [Auto Hold] mirtazapine (REMERON) tablet 45 mg  45 mg Oral At Bedtime   45 mg at 04/17/18 2343     [Auto Hold] nicotine polacrilex lozenge 4-8 mg  4-8 mg Buccal Q1H PRN         [Auto Hold] ondansetron (ZOFRAN-ODT) ODT tab 4 mg  4 mg Oral Q8H PRN         [Auto Hold] PARoxetine (PAXIL) tablet 20 mg  20 mg Oral Daily         [Auto Hold] QUEtiapine (SEROquel) tablet 200-400 mg  200-400 mg Oral At Bedtime   400 mg at 04/17/18 2343     [Auto Hold] traZODone  (DESYREL) tablet 50 mg  50 mg Oral At Bedtime   50 mg at 04/17/18 2343     [Auto Hold] vortioxetine (TRINTELLIX/BRINTELLIX) tablet 20 mg  20 mg Oral Daily         No current [facility]-ordered outpatient prescriptions on file.       Wt Readings from Last 1 Encounters:   04/17/18 103.3 kg (227 lb 12.8 oz)     Temp Readings from Last 1 Encounters:   04/18/18 36.9  C (98.4  F) (Oral)     BP Readings from Last 6 Encounters:   04/18/18 160/88   05/24/12 155/88     Pulse Readings from Last 4 Encounters:   04/18/18 63     Resp Readings from Last 1 Encounters:   04/18/18 12     SpO2 Readings from Last 1 Encounters:   05/24/12 96%     Recent Labs   Lab Test  04/17/18   2255   NA  135   POTASSIUM  4.1   CHLORIDE  101   CO2  23   ANIONGAP  11   GLC  233*   BUN  7   CR  0.66   ROB  9.0     No results for input(s): AST, ALT, ALKPHOS, BILITOTAL, LIPASE in the last 36853 hours.  Recent Labs   Lab Test  04/17/18   2255   WBC  14.0*   HGB  16.2   PLT  229     No results for input(s): ABO, RH in the last 96547 hours.  No results for input(s): INR, PTT in the last 66351 hours.   No results for input(s): TROPI in the last 81802 hours.  No results for input(s): PH, PCO2, PO2, HCO3 in the last 18140 hours.  No results for input(s): HCG in the last 33071 hours.  No results found for this or any previous visit (from the past 744 hour(s)).    RECENT LABS:   ECG:   ECHO:       Anesthesia Evaluation     .             ROS/MED HX    ENT/Pulmonary:     (+)sleep apnea, tobacco use, Past use , . .    Neurologic:       Cardiovascular:     (+) Dyslipidemia, hypertension--CAD, -past MI,-stent,2010  2 . : . . . :. .       METS/Exercise Tolerance:     Hematologic:         Musculoskeletal:         GI/Hepatic:         Renal/Genitourinary:         Endo:     (+) type II DM .      Psychiatric:     (+) psychiatric history anxiety and depression      Infectious Disease:         Malignancy:         Other:                     Physical Exam  Normal systems:  dental    Airway   Mallampati: II  TM distance: >3 FB  Neck ROM: full    Dental     Cardiovascular   Rhythm and rate: regular and normal      Pulmonary    breath sounds clear to auscultation                    Anesthesia Plan      History & Physical Review  History and physical reviewed and following examination; no interval change.    ASA Status:  3 .    NPO Status:  > 8 hours    Plan for General with Intravenous induction.   PONV prophylaxis:  Ondansetron (or other 5HT-3)       Postoperative Care  Postoperative pain management:  IV analgesics.      Consents  Anesthetic plan, risks, benefits and alternatives discussed with:  Patient..                          .

## 2018-04-18 NOTE — ANESTHESIA POSTPROCEDURE EVALUATION
Patient: Tanner Villatoro    * No procedures listed *    Diagnosis:* No pre-op diagnosis entered *  Diagnosis Additional Information: No value filed.    Anesthesia Type:  General    Note:  Anesthesia Post Evaluation    Patient location during evaluation: PACU  Patient participation: Able to fully participate in evaluation  Level of consciousness: sleepy but conscious and responsive to verbal stimuli  Pain management: adequate  Airway patency: patent  Cardiovascular status: acceptable and hemodynamically stable  Respiratory status: acceptable and unassisted  Hydration status: acceptable  PONV: none     Anesthetic complications: None          Last vitals:  Vitals:    04/18/18 0500 04/18/18 0600 04/18/18 0701   BP: (!) 166/101 160/88 (!) 172/98   Pulse: 61 63 86   Resp: 18 12 20   Temp: 36.9  C (98.4  F)  36.2  C (97.2  F)         Electronically Signed By: Miguel Willis MD  April 18, 2018  7:06 AM

## 2018-04-18 NOTE — ANESTHESIA CARE TRANSFER NOTE
Patient: Tanner Villatoro    * No procedures listed *    Diagnosis: * No pre-op diagnosis entered *  Diagnosis Additional Information: No value filed.    Anesthesia Type:   General     Note:  Airway :Face Mask  Patient transferred to:PACU  Comments: Pt exhibits spontaneous respirations, all monitors and alarms on in PACU, VSS, patent IV, report and transfer of care to RN.  Handoff Report: Identifed the Patient, Identified the Reponsible Provider, Reviewed the pertinent medical history, Discussed the surgical course, Reviewed Intra-OP anesthesia mangement and issues during anesthesia, Set expectations for post-procedure period and Allowed opportunity for questions and acknowledgement of understanding      Vitals: (Last set prior to Anesthesia Care Transfer)    CRNA VITALS  4/18/2018 0630 - 4/18/2018 0701      4/18/2018             NIBP: (!)  195/105    Pulse: 96    SpO2: 94 %    Resp Rate (set): 10                Electronically Signed By: ONEYDA Torres CRNA  April 18, 2018  7:01 AM

## 2018-04-18 NOTE — H&P
[facility] Psychiatric H&P Note       Initial History     The patient's care was discussed with the treatment team and chart notes were reviewed.     Patient examined for psychiatric admission.     IDENTIFICATION    Patient is a 63 year old male  male currently living in [place]. Pt sees PCP Shaun Tilley. Pt is followed by Dr. Pappas at [facility] ([facility] Pt seen on SDU.      HISTORY OF PRESENT ILLNESS    Mr. Tanner Villatoro is a 63 year old male with a history of depression, anxiety, opiate, and sedative use who presents as a direct admission from [facility] (formerly [facility]) on account of worsening anxiety and depression. He had presented to the clinic on an emergency evaluation as pt's insurance company had stated that they are denying prescriptions for Vivitrol as pt has been receiving Ativan prescriptions from other providers. He admitted to taking 50 tablets of Ativan in the past 14 days and having two separate providers for. Pt is an anxious person, a worrier. Pt endorses consistent, pervasive sense of anxiety and worry. Pt finds this anxiety difficult to control, often resulting in restlessness, feeling on edge, irritability, and sleep disturbance. Pt confirms having panic attacks, where pt states they feel like their heart is racing, chest pounding, sweating palms, and cannot breathe.  Pt has severely depressed mood, motivation poor, concentration poor, low energy, hopeless, helpless, and worthless with SI, no plan, able to contract for safety. He was started on a series of ECT and had his first treatment this morning without complications. He will have a second ECT on 4/20/18. He was also started on a phenobarbital taper at 32.4mg tid. He will remain on the unit for the duration of his ECT.    CHEMICAL DEPENDENCY HISTORY    History of opiate and benzodiazepine dependence. Utox not completed on admission.    PAST  PSYCHIATRIC HISTORY    History of depression and anxiety.  Past medication trials include Paxil, Trazodone, Ambien, Ativan, Lyrica, Oxycodone. No prior psychiatric hospitalizations.    FAMILY HISTORY    None reported.    SOCIAL HISTORY    Pt was born and raised in [place] and was the first of three children. His father was involved in the [employer]. His parents were supportive and not abusive. He graduated from high school and attended college for one year before dropping out. He is employed as a realtor.      Medications     Prescriptions Prior to Admission   Medication Sig Dispense Refill Last Dose     ASPIRIN PO Take 81 mg by mouth daily    4/16/2018 at Unknown time     Atorvastatin Calcium (LIPITOR PO) Take 40 mg by mouth daily.   4/16/2018 at Unknown time     Eszopiclone (LUNESTA PO) Take 3 mg by mouth At Bedtime   Past Week at Unknown time     IBUPROFEN PO Take 800 mg by mouth 3 times daily as needed for moderate pain   4/16/2018 at Unknown time     Irbesartan (AVAPRO PO) Take 150 mg by mouth daily   4/16/2018 at Unknown time     LEVOTHYROXINE SODIUM PO Take 75 mcg by mouth daily.   4/16/2018 at Unknown time     liraglutide (VICTOZA PEN) 18 MG/3ML soln Inject 1.6 mg Subcutaneous daily   4/16/2018 at Unknown time     METFORMIN HCL PO Take 1,000 mg by mouth daily (with dinner)    4/16/2018 at Unknown time     Mirtazapine (REMERON PO) Take 45 mg by mouth At Bedtime   4/16/2018 at Unknown time     Ondansetron (ZOFRAN ODT PO) Take 4 mg by mouth every 8 hours as needed for nausea or vomiting   Past Month at Unknown time     PARoxetine HCl (PAXIL PO) Take 20 mg by mouth daily    4/17/2018 at Unknown time     QUEtiapine Fumarate (SEROQUEL PO) Take 200-400 mg by mouth At Bedtime   4/16/2018 at Unknown time     TRAZODONE HCL PO Take 50 mg by mouth At Bedtime   4/16/2018 at Unknown time     vortioxetine (TRINTELLIX) 20 MG tablet Take 20 mg by mouth daily   4/17/2018 at Unknown time       Scheduled Medications:    amoxicillin-clavulanate  1 tablet Oral Q12H SHAMAR      "atorvastatin (LIPITOR) tablet 40 mg  40 mg Oral Daily     eszopiclone (LUNESTA) tablet 3 mg  3 mg Oral At Bedtime     insulin aspart  1-7 Units Subcutaneous TID AC     insulin aspart  1-5 Units Subcutaneous At Bedtime     irbesartan (AVAPRO) tablet 150 mg  150 mg Oral Daily     levothyroxine (SYNTHROID/LEVOTHROID) tablet 75 mcg  75 mcg Oral Daily     metFORMIN (GLUCOPHAGE) tablet 1,000 mg  1,000 mg Oral Daily with supper     mirtazapine (REMERON) tablet 45 mg  45 mg Oral At Bedtime     PARoxetine (PAXIL) tablet 20 mg  20 mg Oral Daily     QUEtiapine (SEROquel) tablet 200-400 mg  200-400 mg Oral At Bedtime     traZODone (DESYREL) tablet 50 mg  50 mg Oral At Bedtime     vortioxetine  20 mg Oral Daily     PRNs:  glucose **OR** dextrose **OR** glucagon, ibuprofen (ADVIL/MOTRIN) tablet 800 mg, ketorolac, nicotine polacrilex, ondansetron      Allergies      No Known Allergies     Previous Medical History     Past Medical History:   Diagnosis Date     Anxiety      Coronary artery disease      Diabetes mellitus (H)      Headaches      Heart attack      Hyperlipemia      Hypertension      Insomnia, unspecified      Sleep apnea         Medical Review of Systems     /82  Pulse 87  Temp 98  F (36.7  C) (Oral)  Resp 17  Ht 1.778 m (5' 10\")  Wt 103.3 kg (227 lb 12.8 oz)  SpO2 93%  BMI 32.69 kg/m2  Body mass index is 32.69 kg/(m^2).    Previous 10-point ROS completed by ONEYDA Ayala CNP on 4/18/18 reviewed by Irving Pappas MD on April 18, 2018 and is unchanged except for those problems mentioned within the HPI.     Mental Status Examination     Appearance Sitting in chair, dressed in scrubs. Appears stated age.   Attitude Cooperative   Orientation Oriented to person, place, time   Eye Contact Poor   Speech Regular rate, rhythm, volume and tone   Language Normal   Psychomotor Behavior Normal   Mood Severely depressed and anxious   Affect Flat   Thought Process Goal-Oriented, Intact   Associations Intact "   Thought Content Patient is currently negative for suicidal ideation, negative for plan or intent, able to contract no self harm and identify barriers to suicide.  Negative for obsessions, compulsions or psychosis.      Fund of Knowledge Average   Insight Poor   Judgement Impaired   Attention Span & Concentration Intact   Recent & Remote Memory Impaired secondary to ECT   Gait Normal   Muscle Tone Intact      Labs     Labs reviewed.  Recent Results (from the past 24 hour(s))   CBC with platelets differential    Collection Time: 04/17/18 10:55 PM   Result Value Ref Range    WBC 14.0 (H) 4.0 - 11.0 10e9/L    RBC Count 5.02 4.4 - 5.9 10e12/L    Hemoglobin 16.2 13.3 - 17.7 g/dL    Hematocrit 44.7 40.0 - 53.0 %    MCV 89 78 - 100 fl    MCH 32.3 26.5 - 33.0 pg    MCHC 36.2 31.5 - 36.5 g/dL    RDW 12.7 10.0 - 15.0 %    Platelet Count 229 150 - 450 10e9/L    Diff Method Automated Method     % Neutrophils 76.6 %    % Lymphocytes 15.5 %    % Monocytes 6.9 %    % Eosinophils 0.5 %    % Basophils 0.2 %    % Immature Granulocytes 0.3 %    Nucleated RBCs 0 0 /100    Absolute Neutrophil 10.7 (H) 1.6 - 8.3 10e9/L    Absolute Lymphocytes 2.2 0.8 - 5.3 10e9/L    Absolute Monocytes 1.0 0.0 - 1.3 10e9/L    Absolute Eosinophils 0.1 0.0 - 0.7 10e9/L    Absolute Basophils 0.0 0.0 - 0.2 10e9/L    Abs Immature Granulocytes 0.0 0 - 0.4 10e9/L    Absolute Nucleated RBC 0.0    Basic metabolic panel    Collection Time: 04/17/18 10:55 PM   Result Value Ref Range    Sodium 135 133 - 144 mmol/L    Potassium 4.1 3.4 - 5.3 mmol/L    Chloride 101 94 - 109 mmol/L    Carbon Dioxide 23 20 - 32 mmol/L    Anion Gap 11 3 - 14 mmol/L    Glucose 233 (H) 70 - 99 mg/dL    Urea Nitrogen 7 7 - 30 mg/dL    Creatinine 0.66 0.66 - 1.25 mg/dL    GFR Estimate >90 >60 mL/min/1.7m2    GFR Estimate If Black >90 >60 mL/min/1.7m2    Calcium 9.0 8.5 - 10.1 mg/dL   TSH with free T4 reflex and/or T3 as indicated    Collection Time: 04/17/18 10:55 PM   Result Value Ref Range     TSH 3.31 0.40 - 4.00 mU/L   Hemoglobin A1c    Collection Time: 04/17/18 10:55 PM   Result Value Ref Range    Hemoglobin A1C 9.9 (H) 0 - 6.4 %   EKG 12-lead, tracing only    Collection Time: 04/17/18 11:15 PM   Result Value Ref Range    Interpretation ECG Click View Image link to view waveform and result    Glucose by meter    Collection Time: 04/18/18  1:08 AM   Result Value Ref Range    Glucose 204 (H) 70 - 99 mg/dL   Glucose by meter    Collection Time: 04/18/18  6:06 AM   Result Value Ref Range    Glucose 163 (H) 70 - 99 mg/dL   Glucose by meter    Collection Time: 04/18/18  7:58 AM   Result Value Ref Range    Glucose 258 (H) 70 - 99 mg/dL   Glucose by meter    Collection Time: 04/18/18 12:14 PM   Result Value Ref Range    Glucose 339 (H) 70 - 99 mg/dL          Impression     This is a 63 year old male with a history of depression and anxiety who was directly admitted to [facility] for worsening depression and Ativan abuse. Discussed pt beginning ECT for treatment-resistant depression. Explained to pt the benefits, side-effects and complications of the procedure. Pt gave verbal consent to series of 6 bilateral ECT treatments starting 4/18/18. ECT treatment plan created, [facility] notified to schedule procedure.  He will have his second ECT on 4/20/18. He will also start on a phenobarbital taper at 32.4mg tid.     Diagnoses     1. Major depression, recurrent, severe, without psychotic features.  2. Panic disorder without agoraphobia.   3. Sedative dependence.  4. Opiate use disorder, in remission.     Plan     1. Explained side effects, benefits, and complications of medications to the patient, Pt gave verbal consent.  2. Medication changes: Begin phenobarbital 32.4mg tid.  3. Discussed treatment plan with patient and team.  4. Projected length of stay: Until ECT series has completed and pt has been stabilized.  5. Second ECT on 4/20/18.        Attestation:   Patient has been seen and evaluated by me, Irving Pappas,  MD.    Patient ID:  Name: Tanner Villatoro MRN: [id number]  Admission: 4/17/2018  YOB: 1955

## 2018-04-18 NOTE — PLAN OF CARE
"Problem: Depressive Symptoms  Goal: Depressive Symptoms  Signs and symptoms of listed problems will be absent or manageable.   1. Mood stability   2. Absence of suicidal ideation/contracts for safety   3. Depressive s/sx resolved  4. Safety plan in place  5. Positive coping skills identified/utilized  6. Medication regiment established/compliance  7. Adequate sleep  8. Housing community support  9. F/u plan in place     Outcome: Therapy, progress towards functional goals is fair  Pt had ECT this morning and has been resting in bed all morning, but has been out of room after lunch time. Pt is calm and cooperative. Med compliant. C/O headache, ibuprofen given. Pt given 2 doses of insulin today, pt states \"I have never used an insulin pen before.\" During 1 on 1, pt states \"I am doing okay but life has been hell the past four months.\"       "

## 2018-04-18 NOTE — PROGRESS NOTES
04/17/18 2249   Patient Belongings   Did you bring any home meds/supplements to the hospital?  Yes   Disposition of meds  Sent to security/pharmacy per site process   Patient Belongings other (see comments)   Disposition of Belongings Locker   Belongings Search Yes   Clothing Search Yes   Second Staff Nely   General Info Comment All belongings searched and placed in locker on unit   Cell phone- white Iphone   Black jacket  White long sleeve shirt     1 pair black dress socks   1 handkerchief   White t-shirt  1 pair of socks  1 pair black dress shoes   [place] 's license   Car insurance card   Medical insurance card   $60.00 cash     In security envelope #[id number]:   [employer] #[id number]  [employer] credit card #[id number]  [employer] card #[id number]  [employer] bank #[id number]    In security envelope #[id number]:   Nicotine lozenges (2 bottles)                    Admission:  I am responsible for any personal items that are not sent to the safe or pharmacy.  [facility] is not responsible for loss, theft or damage of any property in my possession.    Signature:  _________________________________ Date: _______  Time: _____                                              Staff Signature:  ____________________________ Date: ________  Time: _____      2nd Staff person, if patient is unable/unwilling to sign:    Signature: ________________________________ Date: ________  Time: _____     Discharge:  [facility] has returned all of my personal belongings:    Signature: _________________________________ Date: ________  Time: _____                                          Staff Signature:  ____________________________ Date: ________  Time: _____

## 2018-04-19 LAB
ALBUMIN UR-MCNC: NEGATIVE MG/DL
AMPHETAMINES UR QL SCN: NEGATIVE
APPEARANCE UR: CLEAR
BARBITURATES UR QL: POSITIVE
BENZODIAZ UR QL: NEGATIVE
BILIRUB UR QL STRIP: NEGATIVE
CANNABINOIDS UR QL SCN: NEGATIVE
COCAINE UR QL: NEGATIVE
COLOR UR AUTO: ABNORMAL
GLUCOSE BLDC GLUCOMTR-MCNC: 131 MG/DL (ref 70–99)
GLUCOSE BLDC GLUCOMTR-MCNC: 218 MG/DL (ref 70–99)
GLUCOSE BLDC GLUCOMTR-MCNC: 232 MG/DL (ref 70–99)
GLUCOSE BLDC GLUCOMTR-MCNC: 241 MG/DL (ref 70–99)
GLUCOSE UR STRIP-MCNC: >1000 MG/DL
HGB UR QL STRIP: NEGATIVE
INTERPRETATION ECG - MUSE: NORMAL
KETONES UR STRIP-MCNC: NEGATIVE MG/DL
LEUKOCYTE ESTERASE UR QL STRIP: NEGATIVE
NITRATE UR QL: NEGATIVE
OPIATES UR QL SCN: NEGATIVE
PCP UR QL SCN: NEGATIVE
PH UR STRIP: 6.5 PH (ref 5–7)
SOURCE: ABNORMAL
SP GR UR STRIP: 1.02 (ref 1–1.03)
UROBILINOGEN UR STRIP-MCNC: NORMAL MG/DL (ref 0–2)

## 2018-04-19 PROCEDURE — 99231 SBSQ HOSP IP/OBS SF/LOW 25: CPT | Performed by: PHYSICIAN ASSISTANT

## 2018-04-19 PROCEDURE — 25000132 ZZH RX MED GY IP 250 OP 250 PS 637: Performed by: PHYSICIAN ASSISTANT

## 2018-04-19 PROCEDURE — 25000132 ZZH RX MED GY IP 250 OP 250 PS 637: Performed by: HOSPITALIST

## 2018-04-19 PROCEDURE — 25000132 ZZH RX MED GY IP 250 OP 250 PS 637: Performed by: INTERNAL MEDICINE

## 2018-04-19 PROCEDURE — 25000125 ZZHC RX 250: Performed by: PHYSICIAN ASSISTANT

## 2018-04-19 PROCEDURE — 81003 URINALYSIS AUTO W/O SCOPE: CPT | Performed by: PSYCHIATRY & NEUROLOGY

## 2018-04-19 PROCEDURE — 90791 PSYCH DIAGNOSTIC EVALUATION: CPT

## 2018-04-19 PROCEDURE — 25000132 ZZH RX MED GY IP 250 OP 250 PS 637: Performed by: PSYCHIATRY & NEUROLOGY

## 2018-04-19 PROCEDURE — 80307 DRUG TEST PRSMV CHEM ANLYZR: CPT | Performed by: PSYCHIATRY & NEUROLOGY

## 2018-04-19 PROCEDURE — 00000146 ZZHCL STATISTIC GLUCOSE BY METER IP

## 2018-04-19 PROCEDURE — 12400006 ZZH R&B MH INTERMEDIATE

## 2018-04-19 RX ORDER — LIRAGLUTIDE 6 MG/ML
0.6 INJECTION SUBCUTANEOUS DAILY
Status: COMPLETED | OUTPATIENT
Start: 2018-04-19 | End: 2018-04-19

## 2018-04-19 RX ORDER — LIRAGLUTIDE 6 MG/ML
1.2 INJECTION SUBCUTANEOUS DAILY
Status: DISCONTINUED | OUTPATIENT
Start: 2018-04-20 | End: 2018-05-01 | Stop reason: HOSPADM

## 2018-04-19 RX ORDER — LIRAGLUTIDE 6 MG/ML
1.8 INJECTION SUBCUTANEOUS DAILY
COMMUNITY
Start: 2018-04-19

## 2018-04-19 RX ADMIN — LIRAGLUTIDE 0.6 MG: 6 INJECTION SUBCUTANEOUS at 09:52

## 2018-04-19 RX ADMIN — ESZOPICLONE 3 MG: 3 TABLET, FILM COATED ORAL at 21:00

## 2018-04-19 RX ADMIN — NICOTINE POLACRILEX 8 MG: 4 LOZENGE ORAL at 08:40

## 2018-04-19 RX ADMIN — INSULIN ASPART 2 UNITS: 100 INJECTION, SOLUTION INTRAVENOUS; SUBCUTANEOUS at 17:17

## 2018-04-19 RX ADMIN — IRBESARTAN 150 MG: 150 TABLET ORAL at 08:35

## 2018-04-19 RX ADMIN — NICOTINE POLACRILEX 8 MG: 4 LOZENGE ORAL at 21:39

## 2018-04-19 RX ADMIN — PHENOBARBITAL 32.4 MG: 32.4 TABLET ORAL at 20:21

## 2018-04-19 RX ADMIN — AMOXICILLIN AND CLAVULANATE POTASSIUM 1 TABLET: 875; 125 TABLET, FILM COATED ORAL at 21:00

## 2018-04-19 RX ADMIN — VORTIOXETINE 20 MG: 20 TABLET, FILM COATED ORAL at 08:36

## 2018-04-19 RX ADMIN — NICOTINE POLACRILEX 8 MG: 4 LOZENGE ORAL at 19:45

## 2018-04-19 RX ADMIN — QUETIAPINE FUMARATE 400 MG: 200 TABLET ORAL at 21:00

## 2018-04-19 RX ADMIN — NICOTINE POLACRILEX 8 MG: 4 LOZENGE ORAL at 16:45

## 2018-04-19 RX ADMIN — INSULIN ASPART 3 UNITS: 100 INJECTION, SOLUTION INTRAVENOUS; SUBCUTANEOUS at 12:42

## 2018-04-19 RX ADMIN — NICOTINE POLACRILEX 8 MG: 4 LOZENGE ORAL at 13:51

## 2018-04-19 RX ADMIN — LEVOTHYROXINE SODIUM 75 MCG: 75 TABLET ORAL at 08:35

## 2018-04-19 RX ADMIN — PHENOBARBITAL 32.4 MG: 32.4 TABLET ORAL at 08:35

## 2018-04-19 RX ADMIN — TRAZODONE HYDROCHLORIDE 50 MG: 50 TABLET ORAL at 21:01

## 2018-04-19 RX ADMIN — MIRTAZAPINE 45 MG: 45 TABLET, FILM COATED ORAL at 21:01

## 2018-04-19 RX ADMIN — METFORMIN HYDROCHLORIDE 1000 MG: 500 TABLET ORAL at 17:17

## 2018-04-19 RX ADMIN — IBUPROFEN 800 MG: 400 TABLET ORAL at 13:50

## 2018-04-19 RX ADMIN — NICOTINE POLACRILEX 8 MG: 4 LOZENGE ORAL at 12:04

## 2018-04-19 RX ADMIN — NICOTINE POLACRILEX 8 MG: 4 LOZENGE ORAL at 15:28

## 2018-04-19 RX ADMIN — IBUPROFEN 800 MG: 400 TABLET ORAL at 20:21

## 2018-04-19 RX ADMIN — ATORVASTATIN CALCIUM 40 MG: 40 TABLET, FILM COATED ORAL at 08:36

## 2018-04-19 RX ADMIN — PHENOBARBITAL 32.4 MG: 32.4 TABLET ORAL at 15:27

## 2018-04-19 RX ADMIN — AMOXICILLIN AND CLAVULANATE POTASSIUM 1 TABLET: 875; 125 TABLET, FILM COATED ORAL at 08:35

## 2018-04-19 RX ADMIN — PAROXETINE HYDROCHLORIDE 20 MG: 20 TABLET, FILM COATED ORAL at 08:36

## 2018-04-19 RX ADMIN — INSULIN ASPART 2 UNITS: 100 INJECTION, SOLUTION INTRAVENOUS; SUBCUTANEOUS at 08:36

## 2018-04-19 ASSESSMENT — ACTIVITIES OF DAILY LIVING (ADL)
GROOMING: INDEPENDENT
ORAL_HYGIENE: INDEPENDENT
DRESS: INDEPENDENT
DRESS: INDEPENDENT
ORAL_HYGIENE: INDEPENDENT
LAUNDRY: WITH SUPERVISION
GROOMING: INDEPENDENT
LAUNDRY: WITH SUPERVISION

## 2018-04-19 NOTE — H&P
Case Management Psycho-Social Assessment    This information has been obtained from the patient's chart and from a personal interview with the patient.     Reason for Admission: Admitted to hospital with worsening depression and anxiety; suicidal ideation    Previous Mental & Chemical Health: Denies previous mental health hospitalizations. Sees Dr. Pappas on outpatient basis for psychiatry. Has history of depression and alcohol use disorder. Has not been through treatment, but quit drinking on his own with help of AA, which he attended for 8 years. Sober from alcohol since . Became dependent on opiates following surgery. Was weaned off opiates by primary care using Lorazepam and subsequently became addicted to benzodiazepines. Is now tapering off benzo's. And undergoing ECT to help resolve deep depression.    Family History:   Born in [place]. Spent early childhood in [place], then moved to [place] and then spent high school in [place], [place]. Father was in the  22 years. Grew up in intact family, the eldest of 3. Has a brother who he is estranged from. Sister  at age 31 in a fire. Both parents now .   Father and grandfather were alcoholics. Abuse and/or trauma history denied.   Patient was  for 22 years to [place].  4 years - related to patient's chemical use and depression.Patient has 3 sons through his marriage, ages 22,20 and 16. Middle son lives with him and is unemployed.    Current Living Situation:   Lives in a house in [place] with his middle son.    Education and Work History:  Graduated from [employer] in . Attended a semester at [employer]. 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   No  service.   Has been involved with [employer]. Currently non-  attending.   Enjoys golf and fishing.     Insurance:  [employer]    Legal Issues :   denies    SS Assessment Needs & Plan:  Patient is completing a course of ECT. He wants to remain sober and plans to return to . He is not certain about chemical health treatment, co-occurring disorders program or [facility]. He will be discussing options with Dr. Pappas.

## 2018-04-19 NOTE — PROGRESS NOTES
IM chart check, H&P 4/18.    Fasting blood glucose 160s on 4/18, uptrended into 200-300s during day. Victoza was not ordered from PTA list.    Type 2 diabetes, uncontrolled.  A1C 9.9 (4/18/2018).  [PTA: Victoza 1.6mg daily and metformin 1000mg daily]  - Resume Victoza once dose verified by pharmacy. Spoke with nursing staff to help facilitate this.  - Continue PTA metformin increase to BID.  - Mod carb diet.  - QID AC HS glucose checks and medium sliding scale insulin.  - Needs follow up with endocrinologist or primary care provider within the month of hospital discharge to likely add third-line agent given poorly controlled A1C.    --- ADDENDUM:  Met and discussed diabetes mngt with patient as read he was refusing insulin. He reports his metformin frequency was recently decreased to daily due to diarrhea side effect, but is willing to take BID for better glucose control. If diarrhea becomes to bothersome, will back off to once daily. Victoza was started about 1 week ago. Today (4/19) is last dose of 0.6mg today and tomorrow (4/20) he increases to 1.2mg daily.  - Victoza 1.2mg daily.  - Continue metformin 1000mg BID.  - Continue mod SSI as above.  - Needs follow up as above and is instructed to tell primary of hospitalization and need for supplemental insulin.  - Hospital service will chart check again tomorrow to access for improved control with addition of Victoza which was initially held on admission.    JoAnna Barthell, PA-C  4/19/2018   3:07 PM

## 2018-04-19 NOTE — PLAN OF CARE
Problem: Depressive Symptoms  Goal: Depressive Symptoms  Signs and symptoms of listed problems will be absent or manageable.   1. Mood stability   2. Absence of suicidal ideation/contracts for safety   3. Depressive s/sx resolved  4. Safety plan in place  5. Positive coping skills identified/utilized  6. Medication regiment established/compliance  7. Adequate sleep  8. Housing community support  9. F/u plan in place     Outcome: Improving  Pleasant and cooperative.  Visible on the unit but withdrawn.  C/o HA post ECT.  States he has not noticed any difference yet, but did not expect to after his first treatment.  His sons came to visit.  C/o feeling bored and declined any suggestions for activities to do. Med compliant.  Appears depressed and blames himself for getting to this point.  Denies SI. Future oriented and hopeful that ECT will help.

## 2018-04-19 NOTE — PROGRESS NOTES
[facility] Psychiatric Progress Note       Interim History     The patient's care was discussed with the treatment team and chart notes were reviewed. Pt seen on SDU. Pt reports he is feeling better this morning. He reports he slept well last night as well. Tolerating medications without side effects. Side effects, risks, and benefits of medications reviewed with patient. He does not exhibit tremors this morning as he is receiving phenobarbital. He will have his second ECT tomorrow and continue to taper with his phenobarbital.  Pt's blood sugars are highly elevated as pt has not been monitoring his diet for the past year, hospitalist is following to ensure this will trend downward. He was also encouraged to work on AA Twelve Steps while on the unit.     Hospital Course     On April 18, 2018, pt was directly admitted to [facility] for worsening depression and misuse of benzodiazepines, being prescribed several prescriptions from two different providers. He was started on a series of ECT and placed on a phenobarbital taper at 32.4mg tid for benzodiazepine withdrawal. On April 19, 2018, pt reported he was starting to feel better with ECT and his medications. He will have his second ECT tomorrow and continue with phenobarbital.      Medications     Current Facility-Administered Medications Ordered in Epic   Medication Dose Route Frequency Last Rate Last Dose     amoxicillin-clavulanate (AUGMENTIN) 875-125 MG per tablet 1 tablet  1 tablet Oral Q12H SHAMAR   1 tablet at 04/19/18 0835     atorvastatin (LIPITOR) tablet 40 mg  40 mg Oral Daily   40 mg at 04/19/18 0836     glucose gel 15-30 g  15-30 g Oral Q15 Min PRN        Or     dextrose 50 % injection 25-50 mL  25-50 mL Intravenous Q15 Min PRN        Or     glucagon injection 1 mg  1 mg Subcutaneous Q15 Min PRN         eszopiclone (LUNESTA) tablet 3 mg  3 mg Oral At Bedtime   3 mg at 04/18/18 2200     ibuprofen (ADVIL/MOTRIN) tablet 800 mg  800 mg Oral TID PRN    "800 mg at 04/18/18 1807     insulin aspart (NovoLOG) inj (RAPID ACTING)  1-7 Units Subcutaneous TID AC   2 Units at 04/19/18 0836     insulin aspart (NovoLOG) inj (RAPID ACTING)  1-5 Units Subcutaneous At Bedtime   2 Units at 04/18/18 2105     irbesartan (AVAPRO) tablet 150 mg  150 mg Oral Daily   150 mg at 04/19/18 0835     ketorolac (TORADOL) injection 30 mg  30 mg Intravenous Q6H PRN   30 mg at 04/18/18 0723     levothyroxine (SYNTHROID/LEVOTHROID) tablet 75 mcg  75 mcg Oral Daily   75 mcg at 04/19/18 0835     [START ON 4/20/2018] liraglutide (VICTOZA) injection 1.2 mg  1.2 mg Subcutaneous Daily         metFORMIN (GLUCOPHAGE) tablet 1,000 mg  1,000 mg Oral Daily with supper   1,000 mg at 04/18/18 1735     mirtazapine (REMERON) tablet 45 mg  45 mg Oral At Bedtime   45 mg at 04/18/18 2200     nicotine polacrilex lozenge 4-8 mg  4-8 mg Buccal Q1H PRN   8 mg at 04/19/18 1204     ondansetron (ZOFRAN-ODT) ODT tab 4 mg  4 mg Oral Q8H PRN         PARoxetine (PAXIL) tablet 20 mg  20 mg Oral Daily   20 mg at 04/19/18 0836     PHENobarbital (LUMINAL) tablet 32.4 mg  32.4 mg Oral TID   32.4 mg at 04/19/18 0835     QUEtiapine (SEROquel) tablet 200-400 mg  200-400 mg Oral At Bedtime   400 mg at 04/18/18 2200     traZODone (DESYREL) tablet 50 mg  50 mg Oral At Bedtime   50 mg at 04/18/18 2200     vortioxetine (TRINTELLIX/BRINTELLIX) tablet 20 mg  20 mg Oral Daily   20 mg at 04/19/18 0836     No current Epic-ordered outpatient prescriptions on file.         Allergies      No Known Allergies     Medical Review of Systems     /84  Pulse 87  Temp 98.4  F (36.9  C) (Oral)  Resp 18  Ht 1.778 m (5' 10\")  Wt 103.3 kg (227 lb 12.8 oz)  SpO2 93%  BMI 32.69 kg/m2  Body mass index is 32.69 kg/(m^2).  A 10-point review of systems was performed by Irvign Pappas MD and is negative, no new findings.      Psychiatric Examination     Appearance Sitting in chair, dressed in casual clothes. Appears stated age.   Attitude " Cooperative   Orientation Oriented to person, place, time   Eye Contact Better   Speech Regular rate, rhythm, volume and tone   Language Normal   Psychomotor Behavior Normal   Mood Slightly less depressed   Affect Constricted range   Thought Process Goal-Oriented, Intact   Associations Intact   Thought Content Patient is currently negative for suicidal ideation, negative for plan or intent, able to contract no self harm and identify barriers to suicide.  Negative for obsessions, compulsions or psychosis.      Fund of Knowledge Average   Insight Slightly improved   Judgement Slightly improved   Attention Span & Concentration Fair   Recent & Remote Memory Fair   Gait Normal   Muscle Tone Intact        Labs     Labs reviewed.  Recent Results (from the past 24 hour(s))   Glucose by meter    Collection Time: 04/18/18  5:09 PM   Result Value Ref Range    Glucose 230 (H) 70 - 99 mg/dL   Glucose by meter    Collection Time: 04/18/18  8:09 PM   Result Value Ref Range    Glucose 284 (H) 70 - 99 mg/dL   Drug abuse screen 77 urine (FL, RH, SH)    Collection Time: 04/19/18 11:35 AM   Result Value Ref Range    Amphetamine Qual Urine Negative NEG^Negative    Barbiturates Qual Urine Positive (A) NEG^Negative    Benzodiazepine Qual Urine Negative NEG^Negative    Cannabinoids Qual Urine Negative NEG^Negative    Cocaine Qual Urine Negative NEG^Negative    Opiates Qualitative Urine Negative NEG^Negative    PCP Qual Urine Negative NEG^Negative   UA reflex to Microscopic    Collection Time: 04/19/18 11:35 AM   Result Value Ref Range    Color Urine Light Yellow     Appearance Urine Clear     Glucose Urine >1000 (A) NEG^Negative mg/dL    Bilirubin Urine Negative NEG^Negative    Ketones Urine Negative NEG^Negative mg/dL    Specific Gravity Urine 1.017 1.003 - 1.035    Blood Urine Negative NEG^Negative    pH Urine 6.5 5.0 - 7.0 pH    Protein Albumin Urine Negative NEG^Negative mg/dL    Urobilinogen mg/dL Normal 0.0 - 2.0 mg/dL    Nitrite  Urine Negative NEG^Negative    Leukocyte Esterase Urine Negative NEG^Negative    Source Midstream Urine         Impression     This is a 63 year old male with a history of depression and anxiety who was directly admitted to [facility] for worsening depression and Ativan abuse. Discussed pt beginning ECT for treatment-resistant depression. Explained to pt the benefits, side-effects and complications of the procedure. Pt gave verbal consent to series of 6 bilateral ECT treatments starting 4/18/18. ECT treatment plan created, [facility] notified to schedule procedure.  He will have his second ECT on 4/20/18. He will also start on a phenobarbital taper at 32.4mg tid.        Diagnoses     1. Major depression, recurrent, severe, without psychotic features.  2. Panic disorder without agoraphobia.   3. Sedative dependence.  4. Opiate use disorder, in remission.     Plan     1. Explained side effects, benefits, and complications of medications to the patient, Pt gave verbal consent.  2. Medication changes: Continue phenobarb taper.  3. Discussed treatment plan with patient and team.  4. Projected length of stay: Until ECT series has completed.  5. Second ECT on 4/20/18.      Attestation:   Patient has been seen and evaluated by me, Irving Pappas MD.    Patient ID:  Name: Tanner Villatoro  MRN: [id number]  Admission: 4/17/2018   YOB: 1955

## 2018-04-19 NOTE — PLAN OF CARE
Problem: Patient Care Overview  Goal: Team Discussion  Team Plan:   Outcome: No Change  BEHAVIORAL TEAM DISCUSSION    Participants: Dr. Pappas, Case Management, Nursing Staff, PA's  Progress: Improving  Continued Stay Criteria/Rationale: ECT  Medical/Physical: ECT  Precautions:   Behavioral Orders   Procedures     Code 1 - Restrict to Unit     Electroconvulsive therapy     Series of up to 12 treatments. Begin Date: 4/18/18     Treating Psychiatrist providing ECT:  Dr Ochoa     Notified on:  4/18/18     Routine Programming     As clinically indicated     Status 15     Every 15 minutes.     Withdrawal precautions     Plan: ECT #2 tomorrow; continue stay through weekend; D/C after Monday  Rationale for change in precautions or plan: ECT

## 2018-04-19 NOTE — PLAN OF CARE
Problem: Depressive Symptoms  Goal: Depressive Symptoms  Signs and symptoms of listed problems will be absent or manageable.   1. Mood stability   2. Absence of suicidal ideation/contracts for safety   3. Depressive s/sx resolved  4. Safety plan in place  5. Positive coping skills identified/utilized  6. Medication regiment established/compliance  7. Adequate sleep  8. Housing community support  9. F/u plan in place     Outcome: Improving  Patient lying in bed for AM group and states he is catching up on sleep. Pt states he feels much better day after ECT and slept well last night. States he had very poor sleep before admission. 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 Will go to groups in PM. Pt aware that if he goes home to do O.P. ECT that he cannot drive for 1 week after the last ECT and should not be doing business transactions

## 2018-04-19 NOTE — CONSULTS
Inpatient Pharmacy Pain Consult:   I met with Radhames for about fifteen minutes.   Other present during the interview include: none  Subjective:  Alertness: He was alert and conversational.   Anxiety: He did not present as anxious during my visit.   Competency as an historian at this time: Good   Non-Verbal Pain presentation: Comfortable   Patient description of pain at this time: Chronic back pain is doing better at this time. He has some minor neck discomfort.   Patient description of pain over the last 24-hours: He had more headache pain yesterday after ECT.   Triggers for pain: Stress; inactivity.   Pain Score(s): We did not discuss.   Non-Pain Score Assessment of pain Control:  Quality of sleep/rest: Slept well last night.   Participation in activity-therapy as ordered: Yes   Ability to focus away from pain: Yes  Objective:  Pertinent medical conditions/Labs to be aware for managing pain: depression, anxiety, hypertension, diabetes, hyperlipidemia, hypothyroid, history of heart attack (stents)  Pertinent pain medication allergies/sensitivities: NKA  Chronic pain history: He has chronic back pain. He is typically an [facility] patient seen by the [facility] [place] [facility].   Pertinent pain medication history: Per his consent, I reviewed a pain note in his [facility] record. He had steroid injections. In June 2017, he was on Ibuprofen 800 mg, Tizanidine 2mg, Lidoderm patch, Oxycodone 5-10mg. He has been off opiates about 7-weeks.   Side effects from pain medications: Addiction   Addiction history: He was quite transparent about how narcotic prescriptions put in a bad situation. He seems motivated to stay off narcotics.   Assessment:  We discussed mental diversion techniques to focus away from pain including focused breathing. I encouraged patient to divert thoughts to activities of comfort (music, reading, TV, thinking about favorite activities).  YES; we focused on walking.   We discussed reasonable goals that consider optimizing  pain control and assuring a safe regimen.   Opiate Evaluation:  Agree with no opiates.   Non-Opiate Evaluation:  Acetaminophen: Do not recommend at this time.  Anti-anxiety agents: Defer to psychiatry.   Anti-depressants: Defer to psychiatry.  Anti-histamines: Do not recommend at this time.  Muscle relaxants: Do not recommend at this time. He said that he became goofy on Tizanidine.   Neuropathic agents: Do not recommend at this time.  NSAIDS: Agree with Ibuprofen 800mg; although there is some risk with heart history to be on an NSAID. We discussed this; he is not seeing a cardiologist at this time.   Topical products: Do not recommend at this time. He has been on Lidoderm and various topical agents with minimal effect.   ICE/Heat: Has not helped that much.   Physical Therapy: He believes that routine walking will help his pain and mental wellbeing.   Plan/Recommendations:  1. He declines needing any additional pain medications at this time.   2. We both agreed that a routine walking will help his pain and mental wellbeing.  Thank you for this consult!  Jose Elias Soot, Cell Phone: [phone]

## 2018-04-19 NOTE — PROGRESS NOTES
"BEHAVIORAL HEALTH NUTRITION ASSESSMENT      REASON FOR ASSESSMENT:  Admission screen - Unintentional weight loss of 10# or more in past 2 months    CURRENT DIET AND NOURISHMENT ORDER:    Information obtained from the pt.    Diet: Mod carb.    Current Intake/Tolerance: Pt states he's eating well.    ANTHROPOMETRICS:    Height: 5' 10\"  Weight: 103.3 kg  BMI: 32.69 kg/m2  IBW: 75.4 kg +/- 10%  %IBW: 137%  Weight History: Pt states he's lost 15# in the past few weeks, \"it was because of the drugs.\"    LABS:  Reviewed    NUTRITION STATUS VALIDATION:  Weight status: Obesity Grade I BMI 30-34.9    INTERVENTION:    Nutrition Diagnosis:  No nutrition diagnosis at this time.    Implementation:   Nutrition education: Per MD order, as appropriate    Follow Up/Monitoring:   No need for further follow-up unless another consult received.    Dasha Lind RD  Pager [phone] (M-F)            [phone] ([facility] Hol)        "

## 2018-04-20 ENCOUNTER — ANESTHESIA (OUTPATIENT)
Dept: SURGERY | Facility: CLINIC | Age: 63
End: 2018-04-20

## 2018-04-20 ENCOUNTER — ANESTHESIA EVENT (OUTPATIENT)
Dept: SURGERY | Facility: CLINIC | Age: 63
End: 2018-04-20

## 2018-04-20 LAB
GLUCOSE BLDC GLUCOMTR-MCNC: 152 MG/DL (ref 70–99)
GLUCOSE BLDC GLUCOMTR-MCNC: 163 MG/DL (ref 70–99)
GLUCOSE BLDC GLUCOMTR-MCNC: 183 MG/DL (ref 70–99)
GLUCOSE BLDC GLUCOMTR-MCNC: 214 MG/DL (ref 70–99)

## 2018-04-20 PROCEDURE — 25000132 ZZH RX MED GY IP 250 OP 250 PS 637: Performed by: INTERNAL MEDICINE

## 2018-04-20 PROCEDURE — 25000132 ZZH RX MED GY IP 250 OP 250 PS 637: Performed by: PSYCHIATRY & NEUROLOGY

## 2018-04-20 PROCEDURE — 25000128 H RX IP 250 OP 636: Performed by: NURSE ANESTHETIST, CERTIFIED REGISTERED

## 2018-04-20 PROCEDURE — 25000125 ZZHC RX 250: Performed by: ANESTHESIOLOGY

## 2018-04-20 PROCEDURE — 25000132 ZZH RX MED GY IP 250 OP 250 PS 637: Performed by: HOSPITALIST

## 2018-04-20 PROCEDURE — 25000128 H RX IP 250 OP 636: Performed by: ANESTHESIOLOGY

## 2018-04-20 PROCEDURE — 25000132 ZZH RX MED GY IP 250 OP 250 PS 637: Performed by: PHYSICIAN ASSISTANT

## 2018-04-20 PROCEDURE — 25000128 H RX IP 250 OP 636: Performed by: PSYCHIATRY & NEUROLOGY

## 2018-04-20 PROCEDURE — 99207 ZZC NON-BILLABLE SERV PER CHARTING: CPT | Performed by: PHYSICIAN ASSISTANT

## 2018-04-20 PROCEDURE — 00000146 ZZHCL STATISTIC GLUCOSE BY METER IP

## 2018-04-20 PROCEDURE — 25000125 ZZHC RX 250: Performed by: NURSE ANESTHETIST, CERTIFIED REGISTERED

## 2018-04-20 PROCEDURE — 12400006 ZZH R&B MH INTERMEDIATE

## 2018-04-20 PROCEDURE — 25000131 ZZH RX MED GY IP 250 OP 636 PS 637: Performed by: NURSE ANESTHETIST, CERTIFIED REGISTERED

## 2018-04-20 PROCEDURE — 90870 ELECTROCONVULSIVE THERAPY: CPT

## 2018-04-20 RX ORDER — SODIUM CHLORIDE, SODIUM LACTATE, POTASSIUM CHLORIDE, CALCIUM CHLORIDE 600; 310; 30; 20 MG/100ML; MG/100ML; MG/100ML; MG/100ML
500 INJECTION, SOLUTION INTRAVENOUS CONTINUOUS
Status: DISCONTINUED | OUTPATIENT
Start: 2018-04-20 | End: 2018-04-23

## 2018-04-20 RX ORDER — KETOROLAC TROMETHAMINE 30 MG/ML
30 INJECTION, SOLUTION INTRAMUSCULAR; INTRAVENOUS EVERY 6 HOURS PRN
Status: DISCONTINUED | OUTPATIENT
Start: 2018-04-20 | End: 2018-04-20

## 2018-04-20 RX ORDER — LABETALOL HYDROCHLORIDE 5 MG/ML
INJECTION, SOLUTION INTRAVENOUS PRN
Status: DISCONTINUED | OUTPATIENT
Start: 2018-04-20 | End: 2018-04-20

## 2018-04-20 RX ORDER — ESMOLOL HYDROCHLORIDE 10 MG/ML
INJECTION INTRAVENOUS PRN
Status: DISCONTINUED | OUTPATIENT
Start: 2018-04-20 | End: 2018-04-20

## 2018-04-20 RX ADMIN — LEVOTHYROXINE SODIUM 75 MCG: 75 TABLET ORAL at 07:51

## 2018-04-20 RX ADMIN — MIRTAZAPINE 45 MG: 45 TABLET, FILM COATED ORAL at 21:22

## 2018-04-20 RX ADMIN — LIDOCAINE HYDROCHLORIDE 1 ML: 10 INJECTION, SOLUTION EPIDURAL; INFILTRATION; INTRACAUDAL; PERINEURAL at 06:24

## 2018-04-20 RX ADMIN — IRBESARTAN 150 MG: 150 TABLET ORAL at 07:51

## 2018-04-20 RX ADMIN — ATORVASTATIN CALCIUM 40 MG: 40 TABLET, FILM COATED ORAL at 07:51

## 2018-04-20 RX ADMIN — VORTIOXETINE 20 MG: 20 TABLET, FILM COATED ORAL at 07:51

## 2018-04-20 RX ADMIN — NICOTINE POLACRILEX 8 MG: 4 LOZENGE ORAL at 16:26

## 2018-04-20 RX ADMIN — ESZOPICLONE 3 MG: 3 TABLET, FILM COATED ORAL at 21:22

## 2018-04-20 RX ADMIN — NICOTINE POLACRILEX 4 MG: 4 LOZENGE ORAL at 21:22

## 2018-04-20 RX ADMIN — PHENOBARBITAL 32.4 MG: 32.4 TABLET ORAL at 20:24

## 2018-04-20 RX ADMIN — KETOROLAC TROMETHAMINE 30 MG: 30 INJECTION, SOLUTION INTRAMUSCULAR at 06:24

## 2018-04-20 RX ADMIN — NICOTINE POLACRILEX 8 MG: 4 LOZENGE ORAL at 11:26

## 2018-04-20 RX ADMIN — LABETALOL HYDROCHLORIDE 5 MG: 5 INJECTION INTRAVENOUS at 06:56

## 2018-04-20 RX ADMIN — Medication 100 MG: at 06:46

## 2018-04-20 RX ADMIN — METFORMIN HYDROCHLORIDE 1000 MG: 500 TABLET ORAL at 17:19

## 2018-04-20 RX ADMIN — AMOXICILLIN AND CLAVULANATE POTASSIUM 1 TABLET: 875; 125 TABLET, FILM COATED ORAL at 07:51

## 2018-04-20 RX ADMIN — INSULIN ASPART 1 UNITS: 100 INJECTION, SOLUTION INTRAVENOUS; SUBCUTANEOUS at 17:21

## 2018-04-20 RX ADMIN — NICOTINE POLACRILEX 8 MG: 4 LOZENGE ORAL at 18:28

## 2018-04-20 RX ADMIN — AMOXICILLIN AND CLAVULANATE POTASSIUM 1 TABLET: 875; 125 TABLET, FILM COATED ORAL at 19:29

## 2018-04-20 RX ADMIN — NICOTINE POLACRILEX 4 MG: 4 LOZENGE ORAL at 20:24

## 2018-04-20 RX ADMIN — LIRAGLUTIDE 1.2 MG: 6 INJECTION SUBCUTANEOUS at 08:20

## 2018-04-20 RX ADMIN — NICOTINE POLACRILEX 8 MG: 4 LOZENGE ORAL at 13:09

## 2018-04-20 RX ADMIN — ESMOLOL HYDROCHLORIDE 20 MG: 10 INJECTION, SOLUTION INTRAVENOUS at 06:52

## 2018-04-20 RX ADMIN — PAROXETINE HYDROCHLORIDE 20 MG: 20 TABLET, FILM COATED ORAL at 07:51

## 2018-04-20 RX ADMIN — IBUPROFEN 800 MG: 400 TABLET ORAL at 13:09

## 2018-04-20 RX ADMIN — TRAZODONE HYDROCHLORIDE 50 MG: 50 TABLET ORAL at 21:22

## 2018-04-20 RX ADMIN — SODIUM CHLORIDE, POTASSIUM CHLORIDE, SODIUM LACTATE AND CALCIUM CHLORIDE 500 ML: 600; 310; 30; 20 INJECTION, SOLUTION INTRAVENOUS at 06:23

## 2018-04-20 RX ADMIN — INSULIN ASPART 2 UNITS: 100 INJECTION, SOLUTION INTRAVENOUS; SUBCUTANEOUS at 08:21

## 2018-04-20 RX ADMIN — SUCCINYLCHOLINE CHLORIDE 100 MG: 20 INJECTION, SOLUTION INTRAMUSCULAR; INTRAVENOUS; PARENTERAL at 06:46

## 2018-04-20 RX ADMIN — LABETALOL HYDROCHLORIDE 10 MG: 5 INJECTION INTRAVENOUS at 06:51

## 2018-04-20 RX ADMIN — METFORMIN HYDROCHLORIDE 1000 MG: 500 TABLET ORAL at 07:51

## 2018-04-20 RX ADMIN — QUETIAPINE FUMARATE 400 MG: 200 TABLET ORAL at 21:22

## 2018-04-20 RX ADMIN — PHENOBARBITAL 32.4 MG: 32.4 TABLET ORAL at 07:51

## 2018-04-20 RX ADMIN — PHENOBARBITAL 32.4 MG: 32.4 TABLET ORAL at 16:02

## 2018-04-20 RX ADMIN — NICOTINE POLACRILEX 8 MG: 4 LOZENGE ORAL at 08:01

## 2018-04-20 RX ADMIN — INSULIN ASPART 1 UNITS: 100 INJECTION, SOLUTION INTRAVENOUS; SUBCUTANEOUS at 12:26

## 2018-04-20 ASSESSMENT — LIFESTYLE VARIABLES: TOBACCO_USE: 1

## 2018-04-20 NOTE — PLAN OF CARE
"Problem: Depressive Symptoms  Goal: Depressive Symptoms  Signs and symptoms of listed problems will be absent or manageable.   1. Mood stability   2. Absence of suicidal ideation/contracts for safety   3. Depressive s/sx resolved  4. Safety plan in place  5. Positive coping skills identified/utilized  6. Medication regiment established/compliance  7. Adequate sleep  8. Housing community support  9. F/u plan in place     Outcome: No Change  Pt had ECT this morning and was \"feeling really bad\" before and after. Pt spent the majority of the AM sleeping and resting in bed. Pt did eat his meals and was med compliant. Pt was pleasant and cooperative on the SDU, but did not attend groups. Pt stated that he wishes that he had hope that ECT would work for his depression, but right now he does not think it will help him. Pt stated that he is having a hard time since discontinuing Opioids. Pt stated that he feels he is a \" bad role model for his son\".       "

## 2018-04-20 NOTE — ANESTHESIA POSTPROCEDURE EVALUATION
Patient: Tanner Villatoro    * No procedures listed *    Diagnosis:* No pre-op diagnosis entered *  Diagnosis Additional Information: No value filed.    Anesthesia Type:  General    Note:  Anesthesia Post Evaluation    Patient location during evaluation: PACU  Patient participation: Able to fully participate in evaluation  Level of consciousness: awake and alert  Pain management: satisfactory to patient  Airway patency: patent  Cardiovascular status: hemodynamically stable  Respiratory status: acceptable and unassisted  Hydration status: balanced  PONV: none     Anesthetic complications: None          Last vitals:  Vitals:    04/20/18 0715 04/20/18 0720 04/20/18 0725   BP: 132/78 133/75 140/77   Pulse:      Resp: 12 13 13   Temp:      SpO2: 93% 93% 92%         Electronically Signed By: Ray Baker MD  April 20, 2018  7:30 AM

## 2018-04-20 NOTE — ANESTHESIA PREPROCEDURE EVALUATION
Anesthesia Evaluation     . Pt has had prior anesthetic.     No history of anesthetic complications          ROS/MED HX    ENT/Pulmonary:     (+)sleep apnea, tobacco use, Past use , . .    Neurologic:       Cardiovascular:     (+) Dyslipidemia, hypertension--CAD, -past MI,-stent,2010  2 . : . . . :. .       METS/Exercise Tolerance:     Hematologic:         Musculoskeletal:         GI/Hepatic:         Renal/Genitourinary:         Endo:     (+) type II DM .      Psychiatric:     (+) psychiatric history anxiety and depression      Infectious Disease:         Malignancy:         Other:                     Physical Exam  Normal systems: dental    Airway   Mallampati: II  TM distance: >3 FB  Neck ROM: full    Dental     Cardiovascular   Rhythm and rate: regular and normal      Pulmonary    breath sounds clear to auscultation                        Anesthesia Plan      History & Physical Review  History and physical reviewed and following examination; no interval change.    ASA Status:  3 .    NPO Status:  > 8 hours    Plan for General with Intravenous induction.   PONV prophylaxis:  Ondansetron (or other 5HT-3)       Postoperative Care  Postoperative pain management:  IV analgesics.      Consents  Anesthetic plan, risks, benefits and alternatives discussed with:  Patient..                          .

## 2018-04-20 NOTE — PROGRESS NOTES
IM chart check for diabetes mngt. Please see progress note by JoAnna Barthell, PA-C from 4/20/2018 for more complete details.    Blood sugars better controlled since starting PTA regimen.    Hospitalist service will sign off.  Recs placed in discharge navigator.  No med changes.    Please call if acute concerns.    JoAnna Barthell, PA-C  4/20/2018   5:35 PM

## 2018-04-20 NOTE — PLAN OF CARE
Problem: Depressive Symptoms  Intervention: Social and Therapeutic Interv (Depressive Symptoms)  Calm and cooperative. Pleasant to staff and peers. In lounge and franz most of shift. ECT in the morning. Pt stated he felt less depressed in the morning, but increasingly more depressed throughout the day. Spoke about his sons and the stress of having teenage sons. Denies suicidal ideation. Is hopefull that ECT with work.

## 2018-04-21 LAB
GLUCOSE BLDC GLUCOMTR-MCNC: 128 MG/DL (ref 70–99)
GLUCOSE BLDC GLUCOMTR-MCNC: 145 MG/DL (ref 70–99)
GLUCOSE BLDC GLUCOMTR-MCNC: 149 MG/DL (ref 70–99)
GLUCOSE BLDC GLUCOMTR-MCNC: 177 MG/DL (ref 70–99)

## 2018-04-21 PROCEDURE — 25000132 ZZH RX MED GY IP 250 OP 250 PS 637: Performed by: PHYSICIAN ASSISTANT

## 2018-04-21 PROCEDURE — 00000146 ZZHCL STATISTIC GLUCOSE BY METER IP

## 2018-04-21 PROCEDURE — 12400006 ZZH R&B MH INTERMEDIATE

## 2018-04-21 PROCEDURE — 25000132 ZZH RX MED GY IP 250 OP 250 PS 637: Performed by: PSYCHIATRY & NEUROLOGY

## 2018-04-21 PROCEDURE — 25000132 ZZH RX MED GY IP 250 OP 250 PS 637: Performed by: INTERNAL MEDICINE

## 2018-04-21 PROCEDURE — 25000132 ZZH RX MED GY IP 250 OP 250 PS 637: Performed by: HOSPITALIST

## 2018-04-21 RX ADMIN — NICOTINE POLACRILEX 4 MG: 4 LOZENGE ORAL at 09:20

## 2018-04-21 RX ADMIN — NICOTINE POLACRILEX 4 MG: 4 LOZENGE ORAL at 13:17

## 2018-04-21 RX ADMIN — QUETIAPINE FUMARATE 400 MG: 200 TABLET ORAL at 21:33

## 2018-04-21 RX ADMIN — NICOTINE POLACRILEX 8 MG: 4 LOZENGE ORAL at 15:36

## 2018-04-21 RX ADMIN — VORTIOXETINE 20 MG: 20 TABLET, FILM COATED ORAL at 09:15

## 2018-04-21 RX ADMIN — INSULIN ASPART 1 UNITS: 100 INJECTION, SOLUTION INTRAVENOUS; SUBCUTANEOUS at 17:17

## 2018-04-21 RX ADMIN — METFORMIN HYDROCHLORIDE 1000 MG: 500 TABLET ORAL at 09:15

## 2018-04-21 RX ADMIN — MIRTAZAPINE 45 MG: 45 TABLET, FILM COATED ORAL at 21:33

## 2018-04-21 RX ADMIN — NICOTINE POLACRILEX 4 MG: 4 LOZENGE ORAL at 12:08

## 2018-04-21 RX ADMIN — INSULIN ASPART 1 UNITS: 100 INJECTION, SOLUTION INTRAVENOUS; SUBCUTANEOUS at 12:16

## 2018-04-21 RX ADMIN — LEVOTHYROXINE SODIUM 75 MCG: 75 TABLET ORAL at 09:16

## 2018-04-21 RX ADMIN — NICOTINE POLACRILEX 4 MG: 4 LOZENGE ORAL at 17:45

## 2018-04-21 RX ADMIN — PHENOBARBITAL 32.4 MG: 32.4 TABLET ORAL at 19:34

## 2018-04-21 RX ADMIN — NICOTINE POLACRILEX 4 MG: 4 LOZENGE ORAL at 11:15

## 2018-04-21 RX ADMIN — LIRAGLUTIDE 1.2 MG: 6 INJECTION SUBCUTANEOUS at 17:15

## 2018-04-21 RX ADMIN — PAROXETINE HYDROCHLORIDE 20 MG: 20 TABLET, FILM COATED ORAL at 09:16

## 2018-04-21 RX ADMIN — NICOTINE POLACRILEX 4 MG: 4 LOZENGE ORAL at 19:34

## 2018-04-21 RX ADMIN — ATORVASTATIN CALCIUM 40 MG: 40 TABLET, FILM COATED ORAL at 10:49

## 2018-04-21 RX ADMIN — METFORMIN HYDROCHLORIDE 1000 MG: 500 TABLET ORAL at 17:23

## 2018-04-21 RX ADMIN — NICOTINE POLACRILEX 4 MG: 4 LOZENGE ORAL at 14:19

## 2018-04-21 RX ADMIN — TRAZODONE HYDROCHLORIDE 50 MG: 50 TABLET ORAL at 21:33

## 2018-04-21 RX ADMIN — AMOXICILLIN AND CLAVULANATE POTASSIUM 1 TABLET: 875; 125 TABLET, FILM COATED ORAL at 09:16

## 2018-04-21 RX ADMIN — ESZOPICLONE 3 MG: 3 TABLET, FILM COATED ORAL at 21:35

## 2018-04-21 RX ADMIN — IRBESARTAN 150 MG: 150 TABLET ORAL at 09:15

## 2018-04-21 RX ADMIN — AMOXICILLIN AND CLAVULANATE POTASSIUM 1 TABLET: 875; 125 TABLET, FILM COATED ORAL at 18:33

## 2018-04-21 RX ADMIN — NICOTINE POLACRILEX 4 MG: 4 LOZENGE ORAL at 18:34

## 2018-04-21 RX ADMIN — NICOTINE POLACRILEX 4 MG: 4 LOZENGE ORAL at 21:35

## 2018-04-21 RX ADMIN — PHENOBARBITAL 32.4 MG: 32.4 TABLET ORAL at 09:20

## 2018-04-21 RX ADMIN — IBUPROFEN 800 MG: 400 TABLET ORAL at 20:41

## 2018-04-21 RX ADMIN — NICOTINE POLACRILEX 4 MG: 4 LOZENGE ORAL at 18:33

## 2018-04-21 RX ADMIN — PHENOBARBITAL 32.4 MG: 32.4 TABLET ORAL at 15:35

## 2018-04-21 ASSESSMENT — PAIN DESCRIPTION - DESCRIPTORS: DESCRIPTORS: HEADACHE

## 2018-04-21 NOTE — PLAN OF CARE
Problem: Depressive Symptoms  Goal: Depressive Symptoms  Signs and symptoms of listed problems will be absent or manageable.   1. Mood stability   2. Absence of suicidal ideation/contracts for safety   3. Depressive s/sx resolved  4. Safety plan in place  5. Positive coping skills identified/utilized  6. Medication regiment established/compliance  7. Adequate sleep  8. Housing community support  9. F/u plan in place     Outcome: No Change   Pt was calm and cooperative on the SDU today and spent time in the lounge socializing with staff and other patients. Pt showered and attended wrap up group. . Pt appeared flat during the beginning  of the shift but his affect improved by the end of the shift. Pt had ECT in the AM and stated that he was feeling anxious before and after.   Pt stated that he is feeling a little better than he was this morning. During room checks a nicotine lozenge was found in his room and he was reminded that he needed to take his meds when they are administered, not saved for later. Pt stated that he understood.

## 2018-04-21 NOTE — PLAN OF CARE
Problem: Depressive Symptoms  Goal: Depressive Symptoms  Signs and symptoms of listed problems will be absent or manageable.   1. Mood stability   2. Absence of suicidal ideation/contracts for safety   3. Depressive s/sx resolved  4. Safety plan in place  5. Positive coping skills identified/utilized  6. Medication regiment established/compliance  7. Adequate sleep  8. Housing community support  9. F/u plan in place     Outcome: No Change  Pt was calm and cooperative on the SDU today and spent time in the lounge socializing with staff and other patients. He spent the majority of the shift in his room bed resting. He attended one group with proper participation.

## 2018-04-22 LAB
GLUCOSE BLDC GLUCOMTR-MCNC: 122 MG/DL (ref 70–99)
GLUCOSE BLDC GLUCOMTR-MCNC: 135 MG/DL (ref 70–99)
GLUCOSE BLDC GLUCOMTR-MCNC: 155 MG/DL (ref 70–99)
GLUCOSE BLDC GLUCOMTR-MCNC: 158 MG/DL (ref 70–99)

## 2018-04-22 PROCEDURE — 12400006 ZZH R&B MH INTERMEDIATE

## 2018-04-22 PROCEDURE — 00000146 ZZHCL STATISTIC GLUCOSE BY METER IP

## 2018-04-22 PROCEDURE — 25000132 ZZH RX MED GY IP 250 OP 250 PS 637: Performed by: PHYSICIAN ASSISTANT

## 2018-04-22 PROCEDURE — 25000132 ZZH RX MED GY IP 250 OP 250 PS 637: Performed by: PSYCHIATRY & NEUROLOGY

## 2018-04-22 PROCEDURE — 25000132 ZZH RX MED GY IP 250 OP 250 PS 637: Performed by: INTERNAL MEDICINE

## 2018-04-22 PROCEDURE — 25000132 ZZH RX MED GY IP 250 OP 250 PS 637: Performed by: HOSPITALIST

## 2018-04-22 RX ADMIN — IBUPROFEN 800 MG: 400 TABLET ORAL at 14:19

## 2018-04-22 RX ADMIN — NICOTINE POLACRILEX 8 MG: 4 LOZENGE ORAL at 14:19

## 2018-04-22 RX ADMIN — LIRAGLUTIDE 1.2 MG: 6 INJECTION SUBCUTANEOUS at 17:35

## 2018-04-22 RX ADMIN — METFORMIN HYDROCHLORIDE 1000 MG: 500 TABLET ORAL at 09:21

## 2018-04-22 RX ADMIN — METFORMIN HYDROCHLORIDE 1000 MG: 500 TABLET ORAL at 17:37

## 2018-04-22 RX ADMIN — PHENOBARBITAL 32.4 MG: 32.4 TABLET ORAL at 09:21

## 2018-04-22 RX ADMIN — AMOXICILLIN AND CLAVULANATE POTASSIUM 1 TABLET: 875; 125 TABLET, FILM COATED ORAL at 09:20

## 2018-04-22 RX ADMIN — ESZOPICLONE 3 MG: 3 TABLET, FILM COATED ORAL at 21:07

## 2018-04-22 RX ADMIN — PHENOBARBITAL 32.4 MG: 32.4 TABLET ORAL at 20:09

## 2018-04-22 RX ADMIN — IRBESARTAN 150 MG: 150 TABLET ORAL at 09:21

## 2018-04-22 RX ADMIN — NICOTINE POLACRILEX 8 MG: 4 LOZENGE ORAL at 11:50

## 2018-04-22 RX ADMIN — NICOTINE POLACRILEX 8 MG: 4 LOZENGE ORAL at 18:19

## 2018-04-22 RX ADMIN — NICOTINE POLACRILEX 8 MG: 4 LOZENGE ORAL at 09:26

## 2018-04-22 RX ADMIN — AMOXICILLIN AND CLAVULANATE POTASSIUM 1 TABLET: 875; 125 TABLET, FILM COATED ORAL at 20:10

## 2018-04-22 RX ADMIN — VORTIOXETINE 20 MG: 20 TABLET, FILM COATED ORAL at 09:21

## 2018-04-22 RX ADMIN — PHENOBARBITAL 32.4 MG: 32.4 TABLET ORAL at 16:04

## 2018-04-22 RX ADMIN — NICOTINE POLACRILEX 8 MG: 4 LOZENGE ORAL at 16:03

## 2018-04-22 RX ADMIN — INSULIN ASPART 1 UNITS: 100 INJECTION, SOLUTION INTRAVENOUS; SUBCUTANEOUS at 12:18

## 2018-04-22 RX ADMIN — NICOTINE POLACRILEX 4 MG: 4 LOZENGE ORAL at 21:18

## 2018-04-22 RX ADMIN — LEVOTHYROXINE SODIUM 75 MCG: 75 TABLET ORAL at 09:22

## 2018-04-22 RX ADMIN — MIRTAZAPINE 45 MG: 45 TABLET, FILM COATED ORAL at 21:06

## 2018-04-22 RX ADMIN — NICOTINE POLACRILEX 8 MG: 4 LOZENGE ORAL at 20:09

## 2018-04-22 RX ADMIN — QUETIAPINE FUMARATE 400 MG: 200 TABLET ORAL at 21:06

## 2018-04-22 RX ADMIN — TRAZODONE HYDROCHLORIDE 50 MG: 50 TABLET ORAL at 21:06

## 2018-04-22 RX ADMIN — PAROXETINE HYDROCHLORIDE 20 MG: 20 TABLET, FILM COATED ORAL at 09:30

## 2018-04-22 RX ADMIN — ATORVASTATIN CALCIUM 40 MG: 40 TABLET, FILM COATED ORAL at 09:22

## 2018-04-22 ASSESSMENT — ACTIVITIES OF DAILY LIVING (ADL)
DRESS: STREET CLOTHES;INDEPENDENT
GROOMING: INDEPENDENT
ORAL_HYGIENE: INDEPENDENT

## 2018-04-22 NOTE — PLAN OF CARE
Problem: Depressive Symptoms  Goal: Depressive Symptoms  Signs and symptoms of listed problems will be absent or manageable.   1. Mood stability   2. Absence of suicidal ideation/contracts for safety   3. Depressive s/sx resolved  4. Safety plan in place  5. Positive coping skills identified/utilized  6. Medication regiment established/compliance  7. Adequate sleep  8. Housing community support  9. F/u plan in place     Outcome: No Change   Patient reported he thinks he is getting better with ECT and his son feels he is improving too. He visited with his sons this evening.  Reports ECT is hard on his body. He spent time in his room bed resting and reading from AA material as well as in the lounge watching tv. He is pleasant, social and cooperative. Presents with a blunted affect and brightens upon approach.

## 2018-04-22 NOTE — PLAN OF CARE
Problem: Depressive Symptoms  Goal: Depressive Symptoms  Signs and symptoms of listed problems will be absent or manageable.   1. Mood stability   2. Absence of suicidal ideation/contracts for safety   3. Depressive s/sx resolved  4. Safety plan in place  5. Positive coping skills identified/utilized  6. Medication regiment established/compliance  7. Adequate sleep  8. Housing community support  9. F/u plan in place     Outcome: No Change  Patient pleasant and cooperative. Visible and social on the unit. Attended groups and participated appropriately.

## 2018-04-23 ENCOUNTER — ANESTHESIA EVENT (OUTPATIENT)
Dept: SURGERY | Facility: CLINIC | Age: 63
End: 2018-04-23

## 2018-04-23 ENCOUNTER — ANESTHESIA (OUTPATIENT)
Dept: SURGERY | Facility: CLINIC | Age: 63
End: 2018-04-23

## 2018-04-23 LAB
GLUCOSE BLDC GLUCOMTR-MCNC: 124 MG/DL (ref 70–99)
GLUCOSE BLDC GLUCOMTR-MCNC: 133 MG/DL (ref 70–99)
GLUCOSE BLDC GLUCOMTR-MCNC: 153 MG/DL (ref 70–99)
GLUCOSE BLDC GLUCOMTR-MCNC: 154 MG/DL (ref 70–99)
GLUCOSE BLDC GLUCOMTR-MCNC: 187 MG/DL (ref 70–99)

## 2018-04-23 PROCEDURE — 00000146 ZZHCL STATISTIC GLUCOSE BY METER IP

## 2018-04-23 PROCEDURE — 25000131 ZZH RX MED GY IP 250 OP 636 PS 637: Performed by: NURSE ANESTHETIST, CERTIFIED REGISTERED

## 2018-04-23 PROCEDURE — 12400006 ZZH R&B MH INTERMEDIATE

## 2018-04-23 PROCEDURE — 25000132 ZZH RX MED GY IP 250 OP 250 PS 637: Performed by: PSYCHIATRY & NEUROLOGY

## 2018-04-23 PROCEDURE — 25000132 ZZH RX MED GY IP 250 OP 250 PS 637: Performed by: PHYSICIAN ASSISTANT

## 2018-04-23 PROCEDURE — 25000128 H RX IP 250 OP 636: Performed by: NURSE ANESTHETIST, CERTIFIED REGISTERED

## 2018-04-23 PROCEDURE — 25000125 ZZHC RX 250: Performed by: NURSE ANESTHETIST, CERTIFIED REGISTERED

## 2018-04-23 PROCEDURE — 25000128 H RX IP 250 OP 636: Performed by: ANESTHESIOLOGY

## 2018-04-23 PROCEDURE — 90870 ELECTROCONVULSIVE THERAPY: CPT

## 2018-04-23 PROCEDURE — 90853 GROUP PSYCHOTHERAPY: CPT

## 2018-04-23 PROCEDURE — 25000132 ZZH RX MED GY IP 250 OP 250 PS 637: Performed by: INTERNAL MEDICINE

## 2018-04-23 PROCEDURE — 25000128 H RX IP 250 OP 636: Performed by: PSYCHIATRY & NEUROLOGY

## 2018-04-23 PROCEDURE — 25000125 ZZHC RX 250: Performed by: ANESTHESIOLOGY

## 2018-04-23 PROCEDURE — 25000132 ZZH RX MED GY IP 250 OP 250 PS 637: Performed by: HOSPITALIST

## 2018-04-23 RX ORDER — KETOROLAC TROMETHAMINE 30 MG/ML
30 INJECTION, SOLUTION INTRAMUSCULAR; INTRAVENOUS EVERY 6 HOURS PRN
Status: DISCONTINUED | OUTPATIENT
Start: 2018-04-23 | End: 2018-04-23 | Stop reason: HOSPADM

## 2018-04-23 RX ORDER — MEPERIDINE HYDROCHLORIDE 25 MG/ML
12.5 INJECTION INTRAMUSCULAR; INTRAVENOUS; SUBCUTANEOUS
Status: DISCONTINUED | OUTPATIENT
Start: 2018-04-23 | End: 2018-04-23 | Stop reason: HOSPADM

## 2018-04-23 RX ORDER — SODIUM CHLORIDE, SODIUM LACTATE, POTASSIUM CHLORIDE, CALCIUM CHLORIDE 600; 310; 30; 20 MG/100ML; MG/100ML; MG/100ML; MG/100ML
INJECTION, SOLUTION INTRAVENOUS CONTINUOUS PRN
Status: DISCONTINUED | OUTPATIENT
Start: 2018-04-23 | End: 2018-04-23

## 2018-04-23 RX ORDER — KETOROLAC TROMETHAMINE 30 MG/ML
30 INJECTION, SOLUTION INTRAMUSCULAR; INTRAVENOUS EVERY 6 HOURS PRN
Status: CANCELLED
Start: 2018-04-23

## 2018-04-23 RX ORDER — LABETALOL HYDROCHLORIDE 5 MG/ML
INJECTION, SOLUTION INTRAVENOUS PRN
Status: DISCONTINUED | OUTPATIENT
Start: 2018-04-23 | End: 2018-04-23

## 2018-04-23 RX ORDER — ESMOLOL HYDROCHLORIDE 10 MG/ML
INJECTION INTRAVENOUS PRN
Status: DISCONTINUED | OUTPATIENT
Start: 2018-04-23 | End: 2018-04-23

## 2018-04-23 RX ORDER — NALOXONE HYDROCHLORIDE 0.4 MG/ML
.1-.4 INJECTION, SOLUTION INTRAMUSCULAR; INTRAVENOUS; SUBCUTANEOUS
Status: DISCONTINUED | OUTPATIENT
Start: 2018-04-23 | End: 2018-04-23 | Stop reason: HOSPADM

## 2018-04-23 RX ORDER — ACETAMINOPHEN 650 MG/1
650 SUPPOSITORY RECTAL EVERY 4 HOURS PRN
Status: DISCONTINUED | OUTPATIENT
Start: 2018-04-23 | End: 2018-04-23 | Stop reason: HOSPADM

## 2018-04-23 RX ORDER — SODIUM CHLORIDE, SODIUM LACTATE, POTASSIUM CHLORIDE, CALCIUM CHLORIDE 600; 310; 30; 20 MG/100ML; MG/100ML; MG/100ML; MG/100ML
INJECTION, SOLUTION INTRAVENOUS ONCE
Status: COMPLETED | OUTPATIENT
Start: 2018-04-23 | End: 2018-04-23

## 2018-04-23 RX ORDER — PHENOBARBITAL 32.4 MG/1
32.4 TABLET ORAL 2 TIMES DAILY
Status: DISCONTINUED | OUTPATIENT
Start: 2018-04-23 | End: 2018-04-24

## 2018-04-23 RX ORDER — ONDANSETRON 4 MG/1
4 TABLET, ORALLY DISINTEGRATING ORAL EVERY 30 MIN PRN
Status: DISCONTINUED | OUTPATIENT
Start: 2018-04-23 | End: 2018-04-23 | Stop reason: HOSPADM

## 2018-04-23 RX ORDER — SODIUM CHLORIDE, SODIUM LACTATE, POTASSIUM CHLORIDE, CALCIUM CHLORIDE 600; 310; 30; 20 MG/100ML; MG/100ML; MG/100ML; MG/100ML
INJECTION, SOLUTION INTRAVENOUS CONTINUOUS
Status: DISCONTINUED | OUTPATIENT
Start: 2018-04-23 | End: 2018-04-23 | Stop reason: HOSPADM

## 2018-04-23 RX ORDER — ONDANSETRON 2 MG/ML
4 INJECTION INTRAMUSCULAR; INTRAVENOUS EVERY 30 MIN PRN
Status: DISCONTINUED | OUTPATIENT
Start: 2018-04-23 | End: 2018-04-23 | Stop reason: HOSPADM

## 2018-04-23 RX ORDER — KETOROLAC TROMETHAMINE 30 MG/ML
30 INJECTION, SOLUTION INTRAMUSCULAR; INTRAVENOUS EVERY 6 HOURS PRN
Status: DISCONTINUED | OUTPATIENT
Start: 2018-04-23 | End: 2018-04-28

## 2018-04-23 RX ADMIN — PAROXETINE HYDROCHLORIDE 20 MG: 20 TABLET, FILM COATED ORAL at 07:41

## 2018-04-23 RX ADMIN — LIDOCAINE HYDROCHLORIDE 0.5 MG: 10 INJECTION, SOLUTION EPIDURAL; INFILTRATION; INTRACAUDAL; PERINEURAL at 06:06

## 2018-04-23 RX ADMIN — NICOTINE POLACRILEX 8 MG: 4 LOZENGE ORAL at 18:37

## 2018-04-23 RX ADMIN — NICOTINE POLACRILEX 4 MG: 4 LOZENGE ORAL at 13:32

## 2018-04-23 RX ADMIN — NICOTINE POLACRILEX 4 MG: 4 LOZENGE ORAL at 14:49

## 2018-04-23 RX ADMIN — NICOTINE POLACRILEX 4 MG: 4 LOZENGE ORAL at 12:15

## 2018-04-23 RX ADMIN — NICOTINE POLACRILEX 8 MG: 4 LOZENGE ORAL at 15:51

## 2018-04-23 RX ADMIN — NICOTINE POLACRILEX 4 MG: 4 LOZENGE ORAL at 07:38

## 2018-04-23 RX ADMIN — NICOTINE POLACRILEX 4 MG: 4 LOZENGE ORAL at 11:19

## 2018-04-23 RX ADMIN — LEVOTHYROXINE SODIUM 75 MCG: 75 TABLET ORAL at 07:41

## 2018-04-23 RX ADMIN — NICOTINE POLACRILEX 8 MG: 4 LOZENGE ORAL at 20:30

## 2018-04-23 RX ADMIN — KETOROLAC TROMETHAMINE 30 MG: 30 INJECTION, SOLUTION INTRAMUSCULAR at 06:17

## 2018-04-23 RX ADMIN — SUCCINYLCHOLINE CHLORIDE 100 MG: 20 INJECTION, SOLUTION INTRAMUSCULAR; INTRAVENOUS; PARENTERAL at 06:30

## 2018-04-23 RX ADMIN — QUETIAPINE FUMARATE 400 MG: 200 TABLET ORAL at 20:28

## 2018-04-23 RX ADMIN — ESMOLOL HYDROCHLORIDE 60 MG: 10 INJECTION, SOLUTION INTRAVENOUS at 06:36

## 2018-04-23 RX ADMIN — AMOXICILLIN AND CLAVULANATE POTASSIUM 1 TABLET: 875; 125 TABLET, FILM COATED ORAL at 07:41

## 2018-04-23 RX ADMIN — METFORMIN HYDROCHLORIDE 1000 MG: 500 TABLET ORAL at 07:41

## 2018-04-23 RX ADMIN — MIRTAZAPINE 45 MG: 45 TABLET, FILM COATED ORAL at 20:28

## 2018-04-23 RX ADMIN — SODIUM CHLORIDE, POTASSIUM CHLORIDE, SODIUM LACTATE AND CALCIUM CHLORIDE: 600; 310; 30; 20 INJECTION, SOLUTION INTRAVENOUS at 06:29

## 2018-04-23 RX ADMIN — ATORVASTATIN CALCIUM 40 MG: 40 TABLET, FILM COATED ORAL at 07:41

## 2018-04-23 RX ADMIN — LABETALOL HYDROCHLORIDE 10 MG: 5 INJECTION INTRAVENOUS at 06:40

## 2018-04-23 RX ADMIN — AMOXICILLIN AND CLAVULANATE POTASSIUM 1 TABLET: 875; 125 TABLET, FILM COATED ORAL at 20:28

## 2018-04-23 RX ADMIN — IRBESARTAN 150 MG: 150 TABLET ORAL at 07:41

## 2018-04-23 RX ADMIN — ESZOPICLONE 3 MG: 3 TABLET, FILM COATED ORAL at 21:38

## 2018-04-23 RX ADMIN — SODIUM CHLORIDE, POTASSIUM CHLORIDE, SODIUM LACTATE AND CALCIUM CHLORIDE: 600; 310; 30; 20 INJECTION, SOLUTION INTRAVENOUS at 06:06

## 2018-04-23 RX ADMIN — INSULIN ASPART 1 UNITS: 100 INJECTION, SOLUTION INTRAVENOUS; SUBCUTANEOUS at 12:31

## 2018-04-23 RX ADMIN — PHENOBARBITAL 32.4 MG: 32.4 TABLET ORAL at 07:41

## 2018-04-23 RX ADMIN — LIRAGLUTIDE 1.2 MG: 6 INJECTION SUBCUTANEOUS at 17:19

## 2018-04-23 RX ADMIN — METFORMIN HYDROCHLORIDE 1000 MG: 500 TABLET ORAL at 17:19

## 2018-04-23 RX ADMIN — Medication 100 MG: at 06:30

## 2018-04-23 RX ADMIN — PHENOBARBITAL 32.4 MG: 32.4 TABLET ORAL at 20:28

## 2018-04-23 RX ADMIN — VORTIOXETINE 20 MG: 20 TABLET, FILM COATED ORAL at 07:41

## 2018-04-23 RX ADMIN — TRAZODONE HYDROCHLORIDE 50 MG: 50 TABLET ORAL at 20:29

## 2018-04-23 ASSESSMENT — ACTIVITIES OF DAILY LIVING (ADL)
ORAL_HYGIENE: INDEPENDENT
DRESS: SCRUBS (BEHAVIORAL HEALTH);INDEPENDENT
HYGIENE/GROOMING: INDEPENDENT

## 2018-04-23 ASSESSMENT — LIFESTYLE VARIABLES: TOBACCO_USE: 1

## 2018-04-23 NOTE — PLAN OF CARE
Problem: Patient Care Overview  Goal: Team Discussion  Team Plan:   Outcome: Improving  BEHAVIORAL TEAM DISCUSSION    Participants: Dr. Pappas, nurses, social workers, psych asst  Progress: Pt seems to be getting better, Pt will continue ECT in out patient. Pt needs to taper off of phenobarb, starts with 2x daily and then before d/c pt will be on only 1 daily dose. Possible d/c on Wednesday.   Continued Stay Criteria/Rationale: Continue ECT for pt and then continue the other session in outpatient. Tapering off phenobarb for pt benefit.   Medical/Physical: N/A   Precautions:   Behavioral Orders   Procedures     Code 1 - Restrict to Unit     Electroconvulsive therapy     Series of up to 12 treatments. Begin Date: 4/18/18     Treating Psychiatrist providing ECT:  Dr Ochoa     Notified on:  4/18/18     Electroconvulsive therapy     Series of up to 12 treatments. Begin Date: 4/18/18     Treating Psychiatrist providing ECT:  Dr Ochoa     Notified on:  4/18/18     Routine Programming     As clinically indicated     Status 15     Every 15 minutes.     Withdrawal precautions     Plan: Continue ECT treatment for pt, after Wednesday ECT treatment pt might d/c. Taper pt off of Phenobarb, get pt to 1 dose daily before pt is d/c.   Rationale for change in precautions or plan: ECT works for pt, continue treatment and adjust Phenobarb medication prior to d/c.

## 2018-04-23 NOTE — PROGRESS NOTES
[facility] Psychiatric Progress Note       Interim History     The patient's care was discussed with the treatment team and chart notes were reviewed. Pt seen on SDU. Tolerating medications without side effects. Side effects, risks, and benefits of medications reviewed with patient. Pt had his third ECT this morning, no complications. Plan for fourth ECT on Wednesday. He enjoyed a visit from his family over the weekend. He is presently on a phenobarbital taper, will decrease to 32.4mg bid for two days and 32.4mg qhs for two days.     Hospital Course     On April 18, 2018, pt was directly admitted to [facility] for worsening depression and misuse of benzodiazepines, being prescribed several prescriptions from two different providers. He was started on a series of ECT and placed on a phenobarbital taper at 32.4mg tid for benzodiazepine withdrawal. On April 19, 2018, pt reported he was starting to feel better with ECT and his medications. He will have his second ECT tomorrow and continue with phenobarbital. On April 20, 2018, pt had his second ECT without complications. He will continue on phenobarbital and ECT series. On April 23, 2018, pt had his third ECT without complications. Phenobarbital was decreased to 32.4mg bid bid for two days.     Medications     Current Facility-Administered Medications Ordered in Epic   Medication Dose Route Frequency Last Rate Last Dose     amoxicillin-clavulanate (AUGMENTIN) 875-125 MG per tablet 1 tablet  1 tablet Oral Q12H SHAMAR   1 tablet at 04/23/18 0741     atorvastatin (LIPITOR) tablet 40 mg  40 mg Oral Daily   40 mg at 04/23/18 0741     glucose gel 15-30 g  15-30 g Oral Q15 Min PRN        Or     dextrose 50 % injection 25-50 mL  25-50 mL Intravenous Q15 Min PRN        Or     glucagon injection 1 mg  1 mg Subcutaneous Q15 Min PRN         eszopiclone (LUNESTA) tablet 3 mg  3 mg Oral At Bedtime   3 mg at 04/22/18 2107     ibuprofen (ADVIL/MOTRIN) tablet 800 mg  800 mg Oral  TID PRN   800 mg at 04/22/18 1419     insulin aspart (NovoLOG) inj (RAPID ACTING)  1-7 Units Subcutaneous TID AC   1 Units at 04/22/18 1218     insulin aspart (NovoLOG) inj (RAPID ACTING)  1-5 Units Subcutaneous At Bedtime   2 Units at 04/18/18 2105     irbesartan (AVAPRO) tablet 150 mg  150 mg Oral Daily   150 mg at 04/23/18 0741     ketorolac (TORADOL) injection 30 mg  30 mg Intravenous Q6H PRN   30 mg at 04/20/18 0624     ketorolac (TORADOL) injection 30 mg  30 mg Intravenous Q6H PRN   30 mg at 04/23/18 0617     lactated ringers infusion  500 mL Intravenous Continuous 25 mL/hr at 04/20/18 0623 500 mL at 04/20/18 0623     levothyroxine (SYNTHROID/LEVOTHROID) tablet 75 mcg  75 mcg Oral Daily   75 mcg at 04/23/18 0741     lidocaine 1 % 1 mL  1 mL Other Q1H PRN   1 mL at 04/20/18 0624     liraglutide (VICTOZA) injection 1.2 mg  1.2 mg Subcutaneous Daily   1.2 mg at 04/22/18 1735     metFORMIN (GLUCOPHAGE) tablet 1,000 mg  1,000 mg Oral BID w/meals   1,000 mg at 04/23/18 0741     mirtazapine (REMERON) tablet 45 mg  45 mg Oral At Bedtime   45 mg at 04/22/18 2106     nicotine polacrilex (COMMIT) lozenge 4-8 mg  4-8 mg Buccal Q1H PRN   4 mg at 04/23/18 0738     ondansetron (ZOFRAN-ODT) ODT tab 4 mg  4 mg Oral Q8H PRN         PARoxetine (PAXIL) tablet 20 mg  20 mg Oral Daily   20 mg at 04/23/18 0741     PHENobarbital (LUMINAL) tablet 32.4 mg  32.4 mg Oral TID   32.4 mg at 04/23/18 0741     QUEtiapine (SEROquel) tablet 200-400 mg  200-400 mg Oral At Bedtime   400 mg at 04/22/18 2106     traZODone (DESYREL) tablet 50 mg  50 mg Oral At Bedtime   50 mg at 04/22/18 2106     vortioxetine (TRINTELLIX/BRINTELLIX) tablet 20 mg  20 mg Oral Daily   20 mg at 04/23/18 0741     Current Outpatient Prescriptions Ordered in Epic   Medication     liraglutide (VICTOZA PEN) 18 MG/3ML soln         Allergies      No Known Allergies     Medical Review of Systems     /73  Pulse 74  Temp 98.7  F (37.1  C) (Oral)  Resp 16  Ht 1.778 m (5'  "10\")  Wt 103.3 kg (227 lb 12.8 oz)  SpO2 94%  BMI 32.69 kg/m2  Body mass index is 32.69 kg/(m^2).  A 10-point review of systems was performed by Irving Pappas MD and is negative, no new findings.      Psychiatric Examination     Appearance Sitting in chair, dressed in casual clothes. Appears stated age.   Attitude Cooperative, pleasant   Orientation Oriented to person, place, time   Eye Contact Fair   Speech Regular rate, rhythm, volume and tone   Language Normal   Psychomotor Behavior Normal   Mood Less depressed   Affect Constricted range   Thought Process Goal-Oriented, Intact   Associations Intact   Thought Content Patient is currently negative for suicidal ideation, negative for plan or intent, able to contract no self harm and identify barriers to suicide.  Negative for obsessions, compulsions or psychosis.      Fund of Knowledge Average   Insight Slightly improved   Judgement Slightly improved   Attention Span & Concentration Fair   Recent & Remote Memory Fair   Gait Normal   Muscle Tone Intact        Labs     Labs reviewed.  Recent Results (from the past 24 hour(s))   Glucose by meter    Collection Time: 04/22/18  8:28 AM   Result Value Ref Range    Glucose 122 (H) 70 - 99 mg/dL   Glucose by meter    Collection Time: 04/22/18 11:56 AM   Result Value Ref Range    Glucose 158 (H) 70 - 99 mg/dL   Glucose by meter    Collection Time: 04/22/18  5:12 PM   Result Value Ref Range    Glucose 135 (H) 70 - 99 mg/dL   Glucose by meter    Collection Time: 04/22/18  9:04 PM   Result Value Ref Range    Glucose 155 (H) 70 - 99 mg/dL   Glucose by meter    Collection Time: 04/23/18  6:05 AM   Result Value Ref Range    Glucose 124 (H) 70 - 99 mg/dL        Impression     This is a 63 year old male with a history of depression and anxiety who was directly admitted to [facility] for worsening depression and Ativan abuse. Discussed pt beginning ECT for treatment-resistant depression. Explained to pt the benefits, side-effects " and complications of the procedure. Pt gave verbal consent to series of 6 bilateral ECT treatments starting 4/18/18. ECT treatment plan created, HUC notified to schedule procedure.  He will have his second ECT on 4/20/18. He will also start on a phenobarbital taper.      Diagnoses     1. Major depression, recurrent, severe, without psychotic features.  2. Panic disorder without agoraphobia.   3. Sedative dependence.  4. Opiate use disorder, in remission.     Plan     1. Explained side effects, benefits, and complications of medications to the patient, Pt gave verbal consent.  2. Medication changes: Continue phenobarb taper, decrease to 32.4mg bid.  3. Discussed treatment plan with patient and team.  4. Projected length of stay: Until ECT series has completed.  5. Fourth ECT on 4/25/18.      Attestation:   Patient has been seen and evaluated by me, Irving Pappas MD.    Patient ID:  Name: Tanner Villatoro  MRN: [id number]  Admission: 4/17/2018   YOB: 1955

## 2018-04-23 NOTE — ANESTHESIA POSTPROCEDURE EVALUATION
Patient: Tanner Villatoro    * No procedures listed *    Diagnosis:* No pre-op diagnosis entered *  Diagnosis Additional Information: No value filed.    Anesthesia Type:  General    Note:  Anesthesia Post Evaluation    Patient location during evaluation: PACU  Patient participation: Able to fully participate in evaluation  Level of consciousness: awake  Pain management: adequate  Airway patency: patent  Cardiovascular status: acceptable  Respiratory status: acceptable  Hydration status: acceptable  PONV: controlled     Anesthetic complications: None          Last vitals:  Vitals:    04/23/18 0617 04/23/18 0645 04/23/18 0650   BP: 126/78 133/72    Pulse:      Resp: 12 10 10   Temp:      SpO2: 94% 93% 94%         Electronically Signed By: Casa Barnes MD  April 23, 2018  6:55 AM

## 2018-04-23 NOTE — PLAN OF CARE
Problem: Depressive Symptoms  Goal: Depressive Symptoms  Signs and symptoms of listed problems will be absent or manageable.   1. Mood stability   2. Absence of suicidal ideation/contracts for safety   3. Depressive s/sx resolved  4. Safety plan in place  5. Positive coping skills identified/utilized  6. Medication regiment established/compliance  7. Adequate sleep  8. Housing community support  9. F/u plan in place     Outcome: No Change   Pt presented with a blunt affect and  was calm and cooperative on the SDU. Pt received ECT today and spent a good part of the shift in bed and was withdrawn and issolative. Pt ate a late breakfast in his room, but came into the lounge for lunch. Pt stated that the ECT today was not as bad as the first two.

## 2018-04-23 NOTE — ANESTHESIA PREPROCEDURE EVALUATION
Anesthesia Evaluation     . Pt has had prior anesthetic.     No history of anesthetic complications          ROS/MED HX    ENT/Pulmonary:     (+)sleep apnea, tobacco use, Past use , . .    Neurologic:       Cardiovascular:     (+) Dyslipidemia, hypertension-range: controlled, -CAD, -past MI,-stent,2010  2 . : . . . :. .      (-) angina and angina   METS/Exercise Tolerance:     Hematologic:         Musculoskeletal:         GI/Hepatic:        (-) GERD   Renal/Genitourinary:         Endo:     (+) type II DM .      Psychiatric:     (+) psychiatric history anxiety and depression      Infectious Disease:         Malignancy:         Other:                     Physical Exam  Normal systems: cardiovascular, pulmonary and dental    Airway   Mallampati: II  TM distance: >3 FB  Neck ROM: full    Dental     Cardiovascular       Pulmonary                         Anesthesia Plan      History & Physical Review  History and physical reviewed and following examination; no interval change.    ASA Status:  3 .    NPO Status:  > 8 hours    Plan for General with Intravenous induction.      this am    Methohexital 100mg/Jenny 100mg      Postoperative Care  Postoperative pain management:  IV analgesics.      Consents  Anesthetic plan, risks, benefits and alternatives discussed with:  Patient..                          .

## 2018-04-24 LAB
GLUCOSE BLDC GLUCOMTR-MCNC: 119 MG/DL (ref 70–99)
GLUCOSE BLDC GLUCOMTR-MCNC: 129 MG/DL (ref 70–99)
GLUCOSE BLDC GLUCOMTR-MCNC: 144 MG/DL (ref 70–99)
GLUCOSE BLDC GLUCOMTR-MCNC: 153 MG/DL (ref 70–99)

## 2018-04-24 PROCEDURE — 25000132 ZZH RX MED GY IP 250 OP 250 PS 637: Performed by: PHYSICIAN ASSISTANT

## 2018-04-24 PROCEDURE — 25000132 ZZH RX MED GY IP 250 OP 250 PS 637: Performed by: INTERNAL MEDICINE

## 2018-04-24 PROCEDURE — 12400006 ZZH R&B MH INTERMEDIATE

## 2018-04-24 PROCEDURE — 00000146 ZZHCL STATISTIC GLUCOSE BY METER IP

## 2018-04-24 PROCEDURE — 90853 GROUP PSYCHOTHERAPY: CPT

## 2018-04-24 PROCEDURE — 25000132 ZZH RX MED GY IP 250 OP 250 PS 637: Performed by: PSYCHIATRY & NEUROLOGY

## 2018-04-24 PROCEDURE — 25000132 ZZH RX MED GY IP 250 OP 250 PS 637: Performed by: HOSPITALIST

## 2018-04-24 RX ORDER — PHENOBARBITAL 32.4 MG/1
32.4 TABLET ORAL AT BEDTIME
Status: DISCONTINUED | OUTPATIENT
Start: 2018-04-25 | End: 2018-05-01 | Stop reason: HOSPADM

## 2018-04-24 RX ORDER — PAROXETINE 10 MG/1
10 TABLET, FILM COATED ORAL DAILY
Status: DISCONTINUED | OUTPATIENT
Start: 2018-04-25 | End: 2018-05-01 | Stop reason: HOSPADM

## 2018-04-24 RX ORDER — PHENOBARBITAL 32.4 MG/1
32.4 TABLET ORAL 2 TIMES DAILY
Status: COMPLETED | OUTPATIENT
Start: 2018-04-24 | End: 2018-04-24

## 2018-04-24 RX ADMIN — METFORMIN HYDROCHLORIDE 1000 MG: 500 TABLET ORAL at 08:08

## 2018-04-24 RX ADMIN — ESZOPICLONE 3 MG: 3 TABLET, FILM COATED ORAL at 20:48

## 2018-04-24 RX ADMIN — VORTIOXETINE 20 MG: 20 TABLET, FILM COATED ORAL at 08:08

## 2018-04-24 RX ADMIN — ATORVASTATIN CALCIUM 40 MG: 40 TABLET, FILM COATED ORAL at 08:07

## 2018-04-24 RX ADMIN — AMOXICILLIN AND CLAVULANATE POTASSIUM 1 TABLET: 875; 125 TABLET, FILM COATED ORAL at 20:13

## 2018-04-24 RX ADMIN — NICOTINE POLACRILEX 8 MG: 4 LOZENGE ORAL at 16:52

## 2018-04-24 RX ADMIN — PAROXETINE HYDROCHLORIDE 20 MG: 20 TABLET, FILM COATED ORAL at 08:07

## 2018-04-24 RX ADMIN — TRAZODONE HYDROCHLORIDE 50 MG: 50 TABLET ORAL at 20:13

## 2018-04-24 RX ADMIN — METFORMIN HYDROCHLORIDE 1000 MG: 500 TABLET ORAL at 18:10

## 2018-04-24 RX ADMIN — MIRTAZAPINE 45 MG: 45 TABLET, FILM COATED ORAL at 20:13

## 2018-04-24 RX ADMIN — NICOTINE POLACRILEX 8 MG: 4 LOZENGE ORAL at 08:13

## 2018-04-24 RX ADMIN — LIRAGLUTIDE 1.2 MG: 6 INJECTION SUBCUTANEOUS at 18:10

## 2018-04-24 RX ADMIN — NICOTINE POLACRILEX 8 MG: 4 LOZENGE ORAL at 20:50

## 2018-04-24 RX ADMIN — INSULIN ASPART 1 UNITS: 100 INJECTION, SOLUTION INTRAVENOUS; SUBCUTANEOUS at 12:21

## 2018-04-24 RX ADMIN — PHENOBARBITAL 32.4 MG: 32.4 TABLET ORAL at 20:13

## 2018-04-24 RX ADMIN — PHENOBARBITAL 32.4 MG: 32.4 TABLET ORAL at 08:12

## 2018-04-24 RX ADMIN — IRBESARTAN 150 MG: 150 TABLET ORAL at 08:08

## 2018-04-24 RX ADMIN — NICOTINE POLACRILEX 8 MG: 4 LOZENGE ORAL at 14:43

## 2018-04-24 RX ADMIN — NICOTINE POLACRILEX 8 MG: 4 LOZENGE ORAL at 18:30

## 2018-04-24 RX ADMIN — INSULIN ASPART 1 UNITS: 100 INJECTION, SOLUTION INTRAVENOUS; SUBCUTANEOUS at 17:10

## 2018-04-24 RX ADMIN — AMOXICILLIN AND CLAVULANATE POTASSIUM 1 TABLET: 875; 125 TABLET, FILM COATED ORAL at 08:08

## 2018-04-24 RX ADMIN — LEVOTHYROXINE SODIUM 75 MCG: 75 TABLET ORAL at 08:08

## 2018-04-24 RX ADMIN — QUETIAPINE FUMARATE 400 MG: 200 TABLET ORAL at 20:13

## 2018-04-24 RX ADMIN — NICOTINE POLACRILEX 8 MG: 4 LOZENGE ORAL at 11:38

## 2018-04-24 NOTE — PLAN OF CARE
"Problem: Depressive Symptoms  Goal: Depressive Symptoms  Signs and symptoms of listed problems will be absent or manageable.   1. Mood stability   2. Absence of suicidal ideation/contracts for safety   3. Depressive s/sx resolved  4. Safety plan in place  5. Positive coping skills identified/utilized  6. Medication regiment established/compliance  7. Adequate sleep  8. Housing community support  9. F/u plan in place     Outcome: Improving  Patient denies suicidal ideation. Patient does say \"I would not mind if I \". He states that he is not suicidal.  He is looking forward to having outpatient ECT.  He was able to be grateful for the things that he does have in his life. \"I have a lot of things going for me\"  He attended groups and was cooperative and med compliant.      "

## 2018-04-24 NOTE — PROGRESS NOTES
[facility] Psychiatric Progress Note       Interim History     The patient's care was discussed with the treatment team and chart notes were reviewed. Pt seen on SDU. Tolerating medications without side effects. Side effects, risks, and benefits of medications reviewed with patient. Pt had his third ECT this morning, no complications. He has tolerated the decrease in phenobarbital, will continue taper with one dose tomorrow. He will have his fourth ECT tomorrow, tentative discharge for tomorrow. Will decrease Paxil to 10mg qam and begin to taper off of the medication. He does not currently see a therapist, it was suggested that he attend regular AA meetings as well having an individual therapist to develop better coping strategies. Encouraged pt to attend IOP at [facility] to build a support network and develop methods to cope with pt's anxiety and depression. Pt acknowledged.      Hospital Course     On April 18, 2018, pt was directly admitted to [facility] for worsening depression and misuse of benzodiazepines, being prescribed several prescriptions from two different providers. He was started on a series of ECT and placed on a phenobarbital taper at 32.4mg tid for benzodiazepine withdrawal. On April 19, 2018, pt reported he was starting to feel better with ECT and his medications. He will have his second ECT tomorrow and continue with phenobarbital. On April 20, 2018, pt had his second ECT without complications. He will continue on phenobarbital and ECT series. On April 23, 2018, pt had his third ECT without complications. Phenobarbital was decreased to 32.4mg bid bid for two days. On April 24, 2018, pt tolerated the decrease in phenobarbital, he will have his fourth ECT tomorrow. It was recommended that he attend IOP at [facility] on discharge. Paxil will be decreased to 10mg qam.      Medications     Current Facility-Administered Medications Ordered in Epic   Medication Dose  Route Frequency Last Rate Last Dose     amoxicillin-clavulanate (AUGMENTIN) 875-125 MG per tablet 1 tablet  1 tablet Oral Q12H SHAMAR   1 tablet at 04/24/18 0808     atorvastatin (LIPITOR) tablet 40 mg  40 mg Oral Daily   40 mg at 04/24/18 0807     glucose gel 15-30 g  15-30 g Oral Q15 Min PRN        Or     dextrose 50 % injection 25-50 mL  25-50 mL Intravenous Q15 Min PRN        Or     glucagon injection 1 mg  1 mg Subcutaneous Q15 Min PRN         eszopiclone (LUNESTA) tablet 3 mg  3 mg Oral At Bedtime   3 mg at 04/23/18 2138     ibuprofen (ADVIL/MOTRIN) tablet 800 mg  800 mg Oral TID PRN   800 mg at 04/22/18 1419     insulin aspart (NovoLOG) inj (RAPID ACTING)  1-7 Units Subcutaneous TID AC   1 Units at 04/23/18 1231     insulin aspart (NovoLOG) inj (RAPID ACTING)  1-5 Units Subcutaneous At Bedtime   2 Units at 04/18/18 2105     irbesartan (AVAPRO) tablet 150 mg  150 mg Oral Daily   150 mg at 04/24/18 0808     ketorolac (TORADOL) injection 30 mg  30 mg Intravenous Q6H PRN   30 mg at 04/23/18 0617     levothyroxine (SYNTHROID/LEVOTHROID) tablet 75 mcg  75 mcg Oral Daily   75 mcg at 04/24/18 0808     lidocaine 1 % 1 mL  1 mL Other Q1H PRN   1 mL at 04/20/18 0624     liraglutide (VICTOZA) injection 1.2 mg  1.2 mg Subcutaneous Daily   1.2 mg at 04/23/18 1719     metFORMIN (GLUCOPHAGE) tablet 1,000 mg  1,000 mg Oral BID w/meals   1,000 mg at 04/24/18 0808     mirtazapine (REMERON) tablet 45 mg  45 mg Oral At Bedtime   45 mg at 04/23/18 2028     nicotine polacrilex (COMMIT) lozenge 4-8 mg  4-8 mg Buccal Q1H PRN   8 mg at 04/24/18 0813     ondansetron (ZOFRAN-ODT) ODT tab 4 mg  4 mg Oral Q8H PRN         PARoxetine (PAXIL) tablet 20 mg  20 mg Oral Daily   20 mg at 04/24/18 0807     PHENobarbital (LUMINAL) tablet 32.4 mg  32.4 mg Oral BID   32.4 mg at 04/24/18 0812     [START ON 4/25/2018] PHENobarbital (LUMINAL) tablet 32.4 mg  32.4 mg Oral At Bedtime         QUEtiapine (SEROquel) tablet 200-400 mg  200-400 mg Oral At  "Bedtime   400 mg at 04/23/18 2028     traZODone (DESYREL) tablet 50 mg  50 mg Oral At Bedtime   50 mg at 04/23/18 2029     vortioxetine (TRINTELLIX/BRINTELLIX) tablet 20 mg  20 mg Oral Daily   20 mg at 04/24/18 0808     Current Outpatient Prescriptions Ordered in Epic   Medication     liraglutide (VICTOZA PEN) 18 MG/3ML soln         Allergies      No Known Allergies     Medical Review of Systems     /89  Pulse 74  Temp 97.6  F (36.4  C) (Oral)  Resp 16  Ht 1.778 m (5' 10\")  Wt 101.4 kg (223 lb 8 oz)  SpO2 94%  BMI 32.07 kg/m2  Body mass index is 32.07 kg/(m^2).  A 10-point review of systems was performed by Irving Pappas MD and is negative, no new findings.      Psychiatric Examination     Appearance Sitting in chair, dressed in casual clothes. Appears stated age.   Attitude Cooperative, pleasant   Orientation Oriented to person, place, time   Eye Contact Good   Speech Regular rate, rhythm, volume and tone   Language Normal   Psychomotor Behavior Normal   Mood Less depressed   Affect Broader range   Thought Process Goal-Oriented, Intact   Associations Intact   Thought Content Patient is currently negative for suicidal ideation, negative for plan or intent, able to contract no self harm and identify barriers to suicide.  Negative for obsessions, compulsions or psychosis.      Fund of Knowledge Average   Insight Slightly improved   Judgement Improving   Attention Span & Concentration Fair   Recent & Remote Memory Fair   Gait Normal   Muscle Tone Intact        Labs     Labs reviewed.  Recent Results (from the past 24 hour(s))   Glucose by meter    Collection Time: 04/23/18  8:34 AM   Result Value Ref Range    Glucose 154 (H) 70 - 99 mg/dL   Glucose by meter    Collection Time: 04/23/18 12:24 PM   Result Value Ref Range    Glucose 153 (H) 70 - 99 mg/dL   Glucose by meter    Collection Time: 04/23/18  5:13 PM   Result Value Ref Range    Glucose 133 (H) 70 - 99 mg/dL   Glucose by meter    Collection " Time: 04/23/18  8:28 PM   Result Value Ref Range    Glucose 187 (H) 70 - 99 mg/dL        Impression     This is a 63 year old male with a history of depression and anxiety who was directly admitted to [facility] for worsening depression and Ativan abuse. Discussed pt beginning ECT for treatment-resistant depression. Explained to pt the benefits, side-effects and complications of the procedure. Pt gave verbal consent to series of 6 bilateral ECT treatments starting 4/18/18. ECT treatment plan created, HUC notified to schedule procedure.  He will have his second ECT on 4/20/18. He will also start on a phenobarbital taper.      Diagnoses     1. Major depression, recurrent, severe, without psychotic features.  2. Panic disorder without agoraphobia.   3. Sedative dependence.  4. Opiate use disorder, in remission.     Plan     1. Explained side effects, benefits, and complications of medications to the patient, Pt gave verbal consent.  2. Medication changes: Continue phenobarb taper, decrease Paxil to 10mg qam.  3. Discussed treatment plan with patient and team.  4. Projected length of stay: Until ECT series has completed.  5. Fourth ECT on 4/25/18.      Attestation:   Patient has been seen and evaluated by me, Irving Pappas MD.    Patient ID:  Name: Tanner Villatoro  MRN: [id number]  Admission: 4/17/2018   YOB: 1955

## 2018-04-24 NOTE — PLAN OF CARE
Problem: Depressive Symptoms  Goal: Depressive Symptoms  Signs and symptoms of listed problems will be absent or manageable.   1. Mood stability   2. Absence of suicidal ideation/contracts for safety   3. Depressive s/sx resolved  4. Safety plan in place  5. Positive coping skills identified/utilized  6. Medication regiment established/compliance  7. Adequate sleep  8. Housing community support  9. F/u plan in place     Outcome: Improving  Patient presents with blunt affect and calm mood. Napped briefly and spent rest of shift in lounge socializing and watching TV. Appears brighter/more social than on past shifts. ECT 3 this morning. Some memory effects; doesn't remember watching ECT video or other details pre-treatment. Watched video again with a peer this shift. Attended wrap-up group and participated appropriately. Med-compliant. BGMs: 133, 187.

## 2018-04-25 ENCOUNTER — ANESTHESIA EVENT (OUTPATIENT)
Dept: SURGERY | Facility: CLINIC | Age: 63
End: 2018-04-25

## 2018-04-25 ENCOUNTER — ANESTHESIA (OUTPATIENT)
Dept: SURGERY | Facility: CLINIC | Age: 63
End: 2018-04-25

## 2018-04-25 LAB
GLUCOSE BLDC GLUCOMTR-MCNC: 118 MG/DL (ref 70–99)
GLUCOSE BLDC GLUCOMTR-MCNC: 125 MG/DL (ref 70–99)
GLUCOSE BLDC GLUCOMTR-MCNC: 137 MG/DL (ref 70–99)
GLUCOSE BLDC GLUCOMTR-MCNC: 151 MG/DL (ref 70–99)

## 2018-04-25 PROCEDURE — 25000125 ZZHC RX 250: Performed by: NURSE ANESTHETIST, CERTIFIED REGISTERED

## 2018-04-25 PROCEDURE — 25000128 H RX IP 250 OP 636: Performed by: PSYCHIATRY & NEUROLOGY

## 2018-04-25 PROCEDURE — 25000128 H RX IP 250 OP 636: Performed by: NURSE ANESTHETIST, CERTIFIED REGISTERED

## 2018-04-25 PROCEDURE — 25000131 ZZH RX MED GY IP 250 OP 636 PS 637: Performed by: NURSE ANESTHETIST, CERTIFIED REGISTERED

## 2018-04-25 PROCEDURE — 25000132 ZZH RX MED GY IP 250 OP 250 PS 637: Performed by: HOSPITALIST

## 2018-04-25 PROCEDURE — 25000132 ZZH RX MED GY IP 250 OP 250 PS 637: Performed by: PSYCHIATRY & NEUROLOGY

## 2018-04-25 PROCEDURE — 90853 GROUP PSYCHOTHERAPY: CPT

## 2018-04-25 PROCEDURE — 25000132 ZZH RX MED GY IP 250 OP 250 PS 637: Performed by: INTERNAL MEDICINE

## 2018-04-25 PROCEDURE — 25000128 H RX IP 250 OP 636: Performed by: ANESTHESIOLOGY

## 2018-04-25 PROCEDURE — 12400006 ZZH R&B MH INTERMEDIATE

## 2018-04-25 PROCEDURE — 25000132 ZZH RX MED GY IP 250 OP 250 PS 637: Performed by: PHYSICIAN ASSISTANT

## 2018-04-25 PROCEDURE — 25000125 ZZHC RX 250: Performed by: ANESTHESIOLOGY

## 2018-04-25 PROCEDURE — 90870 ELECTROCONVULSIVE THERAPY: CPT

## 2018-04-25 PROCEDURE — 00000146 ZZHCL STATISTIC GLUCOSE BY METER IP

## 2018-04-25 RX ORDER — SODIUM CHLORIDE, SODIUM LACTATE, POTASSIUM CHLORIDE, CALCIUM CHLORIDE 600; 310; 30; 20 MG/100ML; MG/100ML; MG/100ML; MG/100ML
500 INJECTION, SOLUTION INTRAVENOUS CONTINUOUS
Status: DISCONTINUED | OUTPATIENT
Start: 2018-04-25 | End: 2018-04-28

## 2018-04-25 RX ORDER — LABETALOL HYDROCHLORIDE 5 MG/ML
INJECTION, SOLUTION INTRAVENOUS PRN
Status: DISCONTINUED | OUTPATIENT
Start: 2018-04-25 | End: 2018-04-25

## 2018-04-25 RX ORDER — ESMOLOL HYDROCHLORIDE 10 MG/ML
INJECTION INTRAVENOUS PRN
Status: DISCONTINUED | OUTPATIENT
Start: 2018-04-25 | End: 2018-04-25

## 2018-04-25 RX ADMIN — NICOTINE POLACRILEX 8 MG: 4 LOZENGE ORAL at 17:29

## 2018-04-25 RX ADMIN — TRAZODONE HYDROCHLORIDE 50 MG: 50 TABLET ORAL at 21:37

## 2018-04-25 RX ADMIN — SODIUM CHLORIDE, POTASSIUM CHLORIDE, SODIUM LACTATE AND CALCIUM CHLORIDE 500 ML: 600; 310; 30; 20 INJECTION, SOLUTION INTRAVENOUS at 06:10

## 2018-04-25 RX ADMIN — ESZOPICLONE 3 MG: 3 TABLET, FILM COATED ORAL at 21:37

## 2018-04-25 RX ADMIN — PHENOBARBITAL 32.4 MG: 32.4 TABLET ORAL at 20:21

## 2018-04-25 RX ADMIN — IBUPROFEN 800 MG: 400 TABLET ORAL at 11:11

## 2018-04-25 RX ADMIN — VORTIOXETINE 20 MG: 20 TABLET, FILM COATED ORAL at 08:43

## 2018-04-25 RX ADMIN — NICOTINE POLACRILEX 8 MG: 4 LOZENGE ORAL at 13:17

## 2018-04-25 RX ADMIN — KETOROLAC TROMETHAMINE 30 MG: 30 INJECTION, SOLUTION INTRAMUSCULAR at 06:10

## 2018-04-25 RX ADMIN — NICOTINE POLACRILEX 8 MG: 4 LOZENGE ORAL at 19:41

## 2018-04-25 RX ADMIN — MIRTAZAPINE 45 MG: 45 TABLET, FILM COATED ORAL at 21:37

## 2018-04-25 RX ADMIN — AMOXICILLIN AND CLAVULANATE POTASSIUM 1 TABLET: 875; 125 TABLET, FILM COATED ORAL at 08:43

## 2018-04-25 RX ADMIN — LIRAGLUTIDE 1.2 MG: 6 INJECTION SUBCUTANEOUS at 17:23

## 2018-04-25 RX ADMIN — METFORMIN HYDROCHLORIDE 1000 MG: 500 TABLET ORAL at 08:43

## 2018-04-25 RX ADMIN — ESMOLOL HYDROCHLORIDE 30 MG: 10 INJECTION, SOLUTION INTRAVENOUS at 06:31

## 2018-04-25 RX ADMIN — NICOTINE POLACRILEX 8 MG: 4 LOZENGE ORAL at 21:40

## 2018-04-25 RX ADMIN — LIDOCAINE HYDROCHLORIDE 1 ML: 10 INJECTION, SOLUTION EPIDURAL; INFILTRATION; INTRACAUDAL; PERINEURAL at 06:11

## 2018-04-25 RX ADMIN — SUCCINYLCHOLINE CHLORIDE 100 MG: 20 INJECTION, SOLUTION INTRAMUSCULAR; INTRAVENOUS; PARENTERAL at 06:29

## 2018-04-25 RX ADMIN — METFORMIN HYDROCHLORIDE 1000 MG: 500 TABLET ORAL at 17:21

## 2018-04-25 RX ADMIN — PAROXETINE HYDROCHLORIDE 10 MG: 10 TABLET, FILM COATED ORAL at 08:43

## 2018-04-25 RX ADMIN — LABETALOL HYDROCHLORIDE 10 MG: 5 INJECTION INTRAVENOUS at 06:36

## 2018-04-25 RX ADMIN — IRBESARTAN 150 MG: 150 TABLET ORAL at 08:43

## 2018-04-25 RX ADMIN — INSULIN ASPART 1 UNITS: 100 INJECTION, SOLUTION INTRAVENOUS; SUBCUTANEOUS at 08:44

## 2018-04-25 RX ADMIN — NICOTINE POLACRILEX 8 MG: 4 LOZENGE ORAL at 11:12

## 2018-04-25 RX ADMIN — ATORVASTATIN CALCIUM 40 MG: 40 TABLET, FILM COATED ORAL at 08:43

## 2018-04-25 RX ADMIN — NICOTINE POLACRILEX 4 MG: 4 LOZENGE ORAL at 09:05

## 2018-04-25 RX ADMIN — LEVOTHYROXINE SODIUM 75 MCG: 75 TABLET ORAL at 08:43

## 2018-04-25 RX ADMIN — Medication 100 MG: at 06:29

## 2018-04-25 RX ADMIN — NICOTINE POLACRILEX 8 MG: 4 LOZENGE ORAL at 15:48

## 2018-04-25 RX ADMIN — QUETIAPINE FUMARATE 400 MG: 200 TABLET ORAL at 21:37

## 2018-04-25 ASSESSMENT — LIFESTYLE VARIABLES: TOBACCO_USE: 1

## 2018-04-25 NOTE — ANESTHESIA POSTPROCEDURE EVALUATION
Patient: Tanner Villatoro    * No procedures listed *    Diagnosis:* No pre-op diagnosis entered *  Diagnosis Additional Information: No value filed.    Anesthesia Type:  General    Note:  Anesthesia Post Evaluation    Patient location during evaluation: PACU  Patient participation: Able to fully participate in evaluation  Level of consciousness: awake  Pain management: adequate  Airway patency: patent  Cardiovascular status: acceptable  Respiratory status: acceptable  Hydration status: acceptable  PONV: none     Anesthetic complications: None          Last vitals:  Vitals:    04/25/18 0710 04/25/18 0715 04/25/18 0720   BP: 135/81 153/89 142/80   Pulse:      Resp: 8 8 11   Temp:      SpO2: 94% 95% 93%         Electronically Signed By: Eric Naylor MD  April 25, 2018  7:29 AM

## 2018-04-25 NOTE — PLAN OF CARE
"Problem: Depressive Symptoms  Goal: Depressive Symptoms  Signs and symptoms of listed problems will be absent or manageable.   1. Mood stability   2. Absence of suicidal ideation/contracts for safety   3. Depressive s/sx resolved  4. Safety plan in place  5. Positive coping skills identified/utilized  6. Medication regiment established/compliance  7. Adequate sleep  8. Housing community support  9. F/u plan in place     Outcome: Therapy, progress toward functional goals is gradual  Pt had ECT this morning. Pt said, \"This time it was brutal.\"  Pt has had some memory lapses, for example was reminded of medication previously given. Pt had no memory of this.  Pt refused groups. Napped approximately for an hour after ECT. Pt is quietly social with peers and talks with staff.       "

## 2018-04-25 NOTE — PLAN OF CARE
"Problem: Depressive Symptoms  Goal: Depressive Symptoms  Signs and symptoms of listed problems will be absent or manageable.   1. Mood stability   2. Absence of suicidal ideation/contracts for safety   3. Depressive s/sx resolved  4. Safety plan in place  5. Positive coping skills identified/utilized  6. Medication regiment established/compliance  7. Adequate sleep  8. Housing community support  9. F/u plan in place     Pt presents with a flat affect and anxious mood.  Pt rated anxiety at an 8 out of 10. Pt stated \"I'm anxious about going home and having another breakdown.\"  Pt attended wrap up group and participated appropriately.  Pt stated \"I wish I was dead.  I wish it would just be all over.\" Pt however, denies suicidal ideation, plan, or intent and contracts for safety.        "

## 2018-04-25 NOTE — PROGRESS NOTES
Cross cover    Paged regarding question on patient's antibiotic stop date.  Patient was started on Augmentin 4/18 for suspected dental infection in the setting of leukocytosis at time of admission.  He is received 7 full days of antibiotics.  No fevers.  - Completed a course of Augmentin, will DC order.  - Follow up closely with dentist as outpatient.    Barry Medina PA-C

## 2018-04-25 NOTE — PROGRESS NOTES
[facility] Psychiatric Progress Note       Interim History     The patient's care was discussed with the treatment team and chart notes were reviewed. Pt seen on SDU. Tolerating medications without side effects. Side effects, risks, and benefits of medications reviewed with patient. Pt had his fourth ECT this morning, no complications. He will have his fifth ECT on Friday. He continues to feel less depressed with each treatment, however does not yet feel ready for discharge to home. Phenobarbital will decrease to 32.4mg qhs tonight for two days then discontinue. He would like to reassess on Friday so he does not have to stay in the hospital over the weekend.     Hospital Course     On April 18, 2018, pt was directly admitted to [facility] for worsening depression and misuse of benzodiazepines, being prescribed several prescriptions from two different providers. He was started on a series of ECT and placed on a phenobarbital taper at 32.4mg tid for benzodiazepine withdrawal. On April 19, 2018, pt reported he was starting to feel better with ECT and his medications. He will have his second ECT tomorrow and continue with phenobarbital. On April 20, 2018, pt had his second ECT without complications. He will continue on phenobarbital and ECT series. On April 23, 2018, pt had his third ECT without complications. Phenobarbital was decreased to 32.4mg bid bid for two days. On April 24, 2018, pt tolerated the decrease in phenobarbital, he will have his fourth ECT tomorrow. It was recommended that he attend IOP at [facility] on discharge. Paxil will be decreased to 10mg qam. On April 25, 2018, pt had his fourth ECT without complications. Phenobarbital will decrease to 32.4mg qhs for two nights. He will remain on the unit for stabilization.     Medications     Current Facility-Administered Medications Ordered in Epic   Medication Dose Route Frequency Last Rate Last Dose     amoxicillin-clavulanate  (AUGMENTIN) 875-125 MG per tablet 1 tablet  1 tablet Oral Q12H SHAMAR   1 tablet at 04/25/18 0843     atorvastatin (LIPITOR) tablet 40 mg  40 mg Oral Daily   40 mg at 04/25/18 0843     glucose gel 15-30 g  15-30 g Oral Q15 Min PRN        Or     dextrose 50 % injection 25-50 mL  25-50 mL Intravenous Q15 Min PRN        Or     glucagon injection 1 mg  1 mg Subcutaneous Q15 Min PRN         eszopiclone (LUNESTA) tablet 3 mg  3 mg Oral At Bedtime   3 mg at 04/24/18 2048     ibuprofen (ADVIL/MOTRIN) tablet 800 mg  800 mg Oral TID PRN   800 mg at 04/22/18 1419     insulin aspart (NovoLOG) inj (RAPID ACTING)  1-7 Units Subcutaneous TID AC   1 Units at 04/25/18 0844     insulin aspart (NovoLOG) inj (RAPID ACTING)  1-5 Units Subcutaneous At Bedtime   2 Units at 04/18/18 2105     irbesartan (AVAPRO) tablet 150 mg  150 mg Oral Daily   150 mg at 04/25/18 0843     ketorolac (TORADOL) injection 30 mg  30 mg Intravenous Q6H PRN   30 mg at 04/25/18 0610     lactated ringers infusion  500 mL Intravenous Continuous 25 mL/hr at 04/25/18 0610 500 mL at 04/25/18 0610     levothyroxine (SYNTHROID/LEVOTHROID) tablet 75 mcg  75 mcg Oral Daily   75 mcg at 04/25/18 0843     lidocaine 1 % 1 mL  1 mL Other Q1H PRN   1 mL at 04/20/18 0624     lidocaine 1 % 1 mL  1 mL Other Q1H PRN   1 mL at 04/25/18 0611     liraglutide (VICTOZA) injection 1.2 mg  1.2 mg Subcutaneous Daily   1.2 mg at 04/24/18 1810     metFORMIN (GLUCOPHAGE) tablet 1,000 mg  1,000 mg Oral BID w/meals   1,000 mg at 04/25/18 0843     mirtazapine (REMERON) tablet 45 mg  45 mg Oral At Bedtime   45 mg at 04/24/18 2013     nicotine polacrilex (COMMIT) lozenge 4-8 mg  4-8 mg Buccal Q1H PRN   4 mg at 04/25/18 0905     ondansetron (ZOFRAN-ODT) ODT tab 4 mg  4 mg Oral Q8H PRN         PARoxetine (PAXIL) tablet 10 mg  10 mg Oral Daily   10 mg at 04/25/18 0843     PHENobarbital (LUMINAL) tablet 32.4 mg  32.4 mg Oral At Bedtime         QUEtiapine (SEROquel) tablet 200-400 mg  200-400 mg Oral At  "Bedtime   400 mg at 04/24/18 2013     traZODone (DESYREL) tablet 50 mg  50 mg Oral At Bedtime   50 mg at 04/24/18 2013     vortioxetine (TRINTELLIX/BRINTELLIX) tablet 20 mg  20 mg Oral Daily   20 mg at 04/25/18 0843     Current Outpatient Prescriptions Ordered in Epic   Medication     liraglutide (VICTOZA PEN) 18 MG/3ML soln         Allergies      No Known Allergies     Medical Review of Systems     /78  Pulse 74  Temp 98.2  F (36.8  C) (Oral)  Resp 16  Ht 1.778 m (5' 10\")  Wt 101.4 kg (223 lb 8 oz)  SpO2 93%  BMI 32.07 kg/m2  Body mass index is 32.07 kg/(m^2).  A 10-point review of systems was performed by Irving Pappas MD and is negative, no new findings.      Psychiatric Examination     Appearance Sitting in chair, dressed in casual clothes. Appears stated age.   Attitude Cooperative, pleasant   Orientation Oriented to person, place, time   Eye Contact Fair   Speech Regular rate, rhythm, volume and tone   Language Normal   Psychomotor Behavior Appropriate   Mood Less depressed   Affect Broader range   Thought Process Goal-Oriented, Intact   Associations Intact   Thought Content Patient is currently negative for suicidal ideation, negative for plan or intent, able to contract no self harm and identify barriers to suicide.  Negative for obsessions, compulsions or psychosis.      Fund of Knowledge Average   Insight Slightly improved   Judgement Improving   Attention Span & Concentration Intact   Recent & Remote Memory Fair   Gait Normal   Muscle Tone Intact        Labs     Labs reviewed.  Recent Results (from the past 24 hour(s))   Glucose by meter    Collection Time: 04/24/18 12:06 PM   Result Value Ref Range    Glucose 144 (H) 70 - 99 mg/dL   Glucose by meter    Collection Time: 04/24/18  5:01 PM   Result Value Ref Range    Glucose 153 (H) 70 - 99 mg/dL   Glucose by meter    Collection Time: 04/24/18  8:52 PM   Result Value Ref Range    Glucose 119 (H) 70 - 99 mg/dL   Glucose by meter    " Collection Time: 04/25/18  7:41 AM   Result Value Ref Range    Glucose 151 (H) 70 - 99 mg/dL        Impression     This is a 63 year old male with a history of depression and anxiety who was directly admitted to [facility] for worsening depression and Ativan abuse. Discussed pt beginning ECT for treatment-resistant depression. Explained to pt the benefits, side-effects and complications of the procedure. Pt gave verbal consent to series of 6 bilateral ECT treatments starting 4/18/18. ECT treatment plan created, HUC notified to schedule procedure.  He will have his second ECT on 4/20/18. He will also start on a phenobarbital taper.      Diagnoses     1. Major depression, recurrent, severe, without psychotic features.  2. Panic disorder without agoraphobia.   3. Sedative dependence.  4. Opiate use disorder, in remission.     Plan     1. Explained side effects, benefits, and complications of medications to the patient, Pt gave verbal consent.  2. Medication changes: Continue phenobarb taper.  3. Discussed treatment plan with patient and team.  4. Projected length of stay: Until ECT series has completed.  5. Fifth ECT on 4/27/18.      Attestation:   Patient has been seen and evaluated by me, Irving Pappas MD.    Patient ID:  Name: Tanner Villatoro  MRN: [id number]  Admission: 4/17/2018   YOB: 1955

## 2018-04-25 NOTE — ANESTHESIA PREPROCEDURE EVALUATION
"Procedure: * No procedures listed *  Preop diagnosis: * No pre-op diagnosis entered *  No Known Allergies  Patient Active Problem List   Diagnosis     Anxiety and depression     Past Medical History:   Diagnosis Date     Anxiety      Coronary artery disease     stent x 2     Diabetes mellitus (H)     type 2     Headaches      Heart attack      Hyperlipemia      Hypertension      Insomnia, unspecified      Sleep apnea      Past Surgical History:   Procedure Laterality Date     CARDIAC SURGERY      stent x2     ENDOSCOPIC BALLOON SINUPLASTY ACCLARENT  5/24/2012    Procedure:ENDOSCOPIC BALLOON SINUPLASTY ACCLARENT; BILATERAL ENDOSCOPIC ETHMOIDECTOMY, MAXILLARY ANTROSTOMY, FRONTAL SINUSOSTOMY BILATERAL ; Surgeon:ONI CARRERA; Location:[facility]     GENITOURINARY SURGERY      vasectomy     ORTHOPEDIC SURGERY      ankle cystectomy       No current facility-administered medications on file prior to encounter.   Current Outpatient Prescriptions on File Prior to Encounter:  ASPIRIN PO Take 81 mg by mouth daily    Atorvastatin Calcium (LIPITOR PO) Take 40 mg by mouth daily.   LEVOTHYROXINE SODIUM PO Take 75 mcg by mouth daily.   METFORMIN HCL PO Take 1,000 mg by mouth daily (with dinner)    PARoxetine HCl (PAXIL PO) Take 20 mg by mouth daily      /70  Pulse 74  Temp 36.7  C (98.1  F)  Resp 16  Ht 1.778 m (5' 10\")  Wt 101.4 kg (223 lb 8 oz)  SpO2 94%  BMI 32.07 kg/m2    Lab Results   Component Value Date    WBC 14.0 04/17/2018     Lab Results   Component Value Date    RBC 5.02 04/17/2018     Lab Results   Component Value Date    HGB 16.2 04/17/2018     Lab Results   Component Value Date    HCT 44.7 04/17/2018     Lab Results   Component Value Date    MCV 89 04/17/2018     Lab Results   Component Value Date    MCH 32.3 04/17/2018     Lab Results   Component Value Date    MCHC 36.2 04/17/2018     Lab Results   Component Value Date    RDW 12.7 04/17/2018     Lab Results   Component Value Date     04/17/2018 "     No results found for: INR    Last Basic Metabolic Panel:  Lab Results   Component Value Date     04/17/2018      Lab Results   Component Value Date    POTASSIUM 4.1 04/17/2018     Lab Results   Component Value Date    CHLORIDE 101 04/17/2018     Lab Results   Component Value Date    ROB 9.0 04/17/2018     Lab Results   Component Value Date    CO2 23 04/17/2018     Lab Results   Component Value Date    BUN 7 04/17/2018     Lab Results   Component Value Date    CR 0.66 04/17/2018     Lab Results   Component Value Date     04/17/2018     EKG   17-APR-2018 23:15:34 [facility] H ROUTINE RECORD  Sinus rhythm  Possible Inferior infarct , age undetermined  Abnormal ECG  No previous ECGs available  Anesthesia Evaluation     . Pt has had prior anesthetic.     No history of anesthetic complications          ROS/MED HX    ENT/Pulmonary:     (+)sleep apnea, tobacco use, Past use , . .    Neurologic:       Cardiovascular:     (+) Dyslipidemia, hypertension-range: controlled, -CAD, -past MI,-stent,2010  2 . : . . . :. .      (-) angina and angina   METS/Exercise Tolerance:     Hematologic:         Musculoskeletal:         GI/Hepatic:        (-) GERD   Renal/Genitourinary:         Endo:     (+) type II DM thyroid problem hypothyroidism, .      Psychiatric: Comment: insomnia    (+) psychiatric history anxiety and depression      Infectious Disease:         Malignancy:         Other:                     Physical Exam  Normal systems: cardiovascular and pulmonary    Airway   Mallampati: II  TM distance: >3 FB  Neck ROM: full    Dental   (+) partials and missing    Cardiovascular   Rhythm and rate: regular and normal      Pulmonary    breath sounds clear to auscultation                    Anesthesia Plan      History & Physical Review  History and physical reviewed and following examination; no interval change.    ASA Status:  3 .    NPO Status:  > 8 hours    Plan for General with Other induction.      Last treatment 4/23/2018: sux 100 mg, brevital 100 mg, esmolol 60, labetalol 10 mg      Postoperative Care  Postoperative pain management:  IV analgesics.      Consents  Anesthetic plan, risks, benefits and alternatives discussed with:  Patient..                          .

## 2018-04-25 NOTE — ANESTHESIA CARE TRANSFER NOTE
Patient: Tanner Villatoro    * No procedures listed *    Diagnosis: * No pre-op diagnosis entered *  Diagnosis Additional Information: No value filed.    Anesthesia Type:   General     Note:  Airway :Face Mask  Patient transferred to:PACU  Comments: Pt exhibits spontaneous respirations, all monitors and alarms on in PACU, VSS, patent IV, report and transfer of care to RN.  Handoff Report: Identifed the Patient, Identified the Reponsible Provider, Reviewed the pertinent medical history, Discussed the surgical course, Reviewed Intra-OP anesthesia mangement and issues during anesthesia, Set expectations for post-procedure period and Allowed opportunity for questions and acknowledgement of understanding      Vitals: (Last set prior to Anesthesia Care Transfer)    CRNA VITALS  4/25/2018 0614 - 4/25/2018 0645      4/25/2018             Resp Rate (set): 10                Electronically Signed By: ONEYDA Torres CRNA  April 25, 2018  6:45 AM

## 2018-04-26 LAB
GLUCOSE BLDC GLUCOMTR-MCNC: 111 MG/DL (ref 70–99)
GLUCOSE BLDC GLUCOMTR-MCNC: 114 MG/DL (ref 70–99)
GLUCOSE BLDC GLUCOMTR-MCNC: 117 MG/DL (ref 70–99)
GLUCOSE BLDC GLUCOMTR-MCNC: 141 MG/DL (ref 70–99)

## 2018-04-26 PROCEDURE — 12400006 ZZH R&B MH INTERMEDIATE

## 2018-04-26 PROCEDURE — 25000132 ZZH RX MED GY IP 250 OP 250 PS 637: Performed by: INTERNAL MEDICINE

## 2018-04-26 PROCEDURE — 90853 GROUP PSYCHOTHERAPY: CPT

## 2018-04-26 PROCEDURE — 25000132 ZZH RX MED GY IP 250 OP 250 PS 637: Performed by: PSYCHIATRY & NEUROLOGY

## 2018-04-26 PROCEDURE — 25000132 ZZH RX MED GY IP 250 OP 250 PS 637: Performed by: PHYSICIAN ASSISTANT

## 2018-04-26 PROCEDURE — 00000146 ZZHCL STATISTIC GLUCOSE BY METER IP

## 2018-04-26 RX ORDER — QUETIAPINE FUMARATE 25 MG/1
25-50 TABLET, FILM COATED ORAL
Status: DISCONTINUED | OUTPATIENT
Start: 2018-04-26 | End: 2018-04-26

## 2018-04-26 RX ORDER — QUETIAPINE FUMARATE 25 MG/1
25-50 TABLET, FILM COATED ORAL
Status: DISCONTINUED | OUTPATIENT
Start: 2018-04-26 | End: 2018-05-01 | Stop reason: HOSPADM

## 2018-04-26 RX ADMIN — NICOTINE POLACRILEX 8 MG: 4 LOZENGE ORAL at 09:48

## 2018-04-26 RX ADMIN — VORTIOXETINE 20 MG: 20 TABLET, FILM COATED ORAL at 09:06

## 2018-04-26 RX ADMIN — LIRAGLUTIDE 1.2 MG: 6 INJECTION SUBCUTANEOUS at 18:16

## 2018-04-26 RX ADMIN — METFORMIN HYDROCHLORIDE 1000 MG: 500 TABLET ORAL at 09:05

## 2018-04-26 RX ADMIN — NICOTINE POLACRILEX 8 MG: 4 LOZENGE ORAL at 18:05

## 2018-04-26 RX ADMIN — ATORVASTATIN CALCIUM 40 MG: 40 TABLET, FILM COATED ORAL at 09:05

## 2018-04-26 RX ADMIN — MIRTAZAPINE 45 MG: 45 TABLET, FILM COATED ORAL at 21:07

## 2018-04-26 RX ADMIN — IBUPROFEN 800 MG: 400 TABLET ORAL at 12:11

## 2018-04-26 RX ADMIN — IRBESARTAN 150 MG: 150 TABLET ORAL at 09:06

## 2018-04-26 RX ADMIN — QUETIAPINE FUMARATE 400 MG: 200 TABLET ORAL at 21:07

## 2018-04-26 RX ADMIN — PHENOBARBITAL 32.4 MG: 32.4 TABLET ORAL at 21:07

## 2018-04-26 RX ADMIN — QUETIAPINE FUMARATE 50 MG: 25 TABLET ORAL at 16:01

## 2018-04-26 RX ADMIN — NICOTINE POLACRILEX 8 MG: 4 LOZENGE ORAL at 14:27

## 2018-04-26 RX ADMIN — NICOTINE POLACRILEX 8 MG: 4 LOZENGE ORAL at 21:48

## 2018-04-26 RX ADMIN — PAROXETINE HYDROCHLORIDE 10 MG: 10 TABLET, FILM COATED ORAL at 09:05

## 2018-04-26 RX ADMIN — LEVOTHYROXINE SODIUM 75 MCG: 75 TABLET ORAL at 09:05

## 2018-04-26 RX ADMIN — INSULIN ASPART 1 UNITS: 100 INJECTION, SOLUTION INTRAVENOUS; SUBCUTANEOUS at 12:43

## 2018-04-26 RX ADMIN — NICOTINE POLACRILEX 8 MG: 4 LOZENGE ORAL at 12:11

## 2018-04-26 RX ADMIN — METFORMIN HYDROCHLORIDE 1000 MG: 500 TABLET ORAL at 18:14

## 2018-04-26 RX ADMIN — ESZOPICLONE 3 MG: 3 TABLET, FILM COATED ORAL at 21:07

## 2018-04-26 RX ADMIN — NICOTINE POLACRILEX 8 MG: 4 LOZENGE ORAL at 19:45

## 2018-04-26 RX ADMIN — TRAZODONE HYDROCHLORIDE 50 MG: 50 TABLET ORAL at 21:06

## 2018-04-26 RX ADMIN — NICOTINE POLACRILEX 8 MG: 4 LOZENGE ORAL at 16:27

## 2018-04-26 ASSESSMENT — ACTIVITIES OF DAILY LIVING (ADL)
GROOMING: SHOWER;INDEPENDENT
ORAL_HYGIENE: INDEPENDENT
DRESS: SCRUBS (BEHAVIORAL HEALTH)

## 2018-04-26 NOTE — PLAN OF CARE
Problem: Patient Care Overview  Goal: Team Discussion  Team Plan:   Outcome: Improving  BEHAVIORAL TEAM DISCUSSION    Participants: Dr. Pappas, nurses, social workers, psych asst  Progress: Pt feels better than he did the day of ECT, plans on d/c either Friday or Monday.  Continued Stay Criteria/Rationale: Pt has been improving and will continue treatment at [facility].   Medical/Physical: N/A  Precautions:   Behavioral Orders   Procedures     Code 1 - Restrict to Unit     Electroconvulsive therapy     Series of up to 12 treatments. Begin Date: 4/18/18     Treating Psychiatrist providing ECT:  Dr Ochoa     Notified on:  4/18/18     Electroconvulsive therapy     Series of up to 12 treatments. Begin Date: 4/18/18     Treating Psychiatrist providing ECT:  Dr Ochoa     Notified on:  4/18/18     Routine Programming     As clinically indicated     Status 15     Every 15 minutes.     Withdrawal precautions     Plan: Pt will be d/c Friday or Monday, continue treatment at [facility]  Rationale for change in precautions or plan: Continue treatment for pt, get pt prepared for d/c and make sure to explain the structure of out pt treatment at [facility].

## 2018-04-26 NOTE — PLAN OF CARE
Problem: Depressive Symptoms  Goal: Depressive Symptoms  Signs and symptoms of listed problems will be absent or manageable.   1. Mood stability   2. Absence of suicidal ideation/contracts for safety   3. Depressive s/sx resolved  4. Safety plan in place  5. Positive coping skills identified/utilized  6. Medication regiment established/compliance  7. Adequate sleep  8. Housing community support  9. F/u plan in place     Outcome: Improving  Pt states has had some issue with memory this week during ECT series; reassured by staff that this is normal. Pt admits mood is less depressed,but  states feels anxious this afternoon and so received prn Seroquel. Pt is social with peers, often in lounge or franz ,and participates in groups. Pt stated is looking forward to returning home to his 2 dogs and his son; pt inquired about a pass for an afternoon this weekend if he does not discharge on Friday, but stays through the weekend for ECT #6 Monday morning. Pt states is bored. Participated in Wrap-up group. Med compliant.

## 2018-04-26 NOTE — PLAN OF CARE
Problem: Depressive Symptoms  Goal: Depressive Symptoms  Signs and symptoms of listed problems will be absent or manageable.   1. Mood stability   2. Absence of suicidal ideation/contracts for safety   3. Depressive s/sx resolved  4. Safety plan in place  5. Positive coping skills identified/utilized  6. Medication regiment established/compliance  7. Adequate sleep  8. Housing community support  9. F/u plan in place     Outcome: No Change  Patient presents with a flat affect but brightens upon approach. Pt is pleasant and visible in the milieu. Pt states that he was suppose to discharge today however he wasn't feeling well after ECT so he decided to stay. Pt states he is nervous about discharge and is not confident that ECT is working for him. Pt attended unit programing and was appropriate. Pt requested that he receive an additional dose of anxiety medication in the evening.

## 2018-04-26 NOTE — PROGRESS NOTES
"[facility] Psychiatric Progress Note       Interim History     The patient's care was discussed with the treatment team and chart notes were reviewed. Pt seen on SDU. Tolerating medications without side effects. Side effects, risks, and benefits of medications reviewed with patient. He will have his fifth ECT tomorrow. He would like to have something available for his anxiety in the afternoon, reports he has \"crippling\" anxiety from 2075-2451 almost every day. Dr. Pappas encouraged pt to practice Freeze Frame Exercise -- to think of one specific extremely positive memory multiple times a day to preemptively keep pt from becoming more anxious and depressed.  He was encouraged to work on AA resources on the unit to address his chemical dependency.         Hospital Course     On April 18, 2018, pt was directly admitted to [facility] for worsening depression and misuse of benzodiazepines, being prescribed several prescriptions from two different providers. He was started on a series of ECT and placed on a phenobarbital taper at 32.4mg tid for benzodiazepine withdrawal. On April 19, 2018, pt reported he was starting to feel better with ECT and his medications. He will have his second ECT tomorrow and continue with phenobarbital. On April 20, 2018, pt had his second ECT without complications. He will continue on phenobarbital and ECT series. On April 23, 2018, pt had his third ECT without complications. Phenobarbital was decreased to 32.4mg bid bid for two days. On April 24, 2018, pt tolerated the decrease in phenobarbital, he will have his fourth ECT tomorrow. It was recommended that he [facility]. Paxil will be decreased to 10mg qam. On April 25, 2018, pt had his fourth ECT without complications. Phenobarbital will decrease to 32.4mg qhs for two nights. He will remain on the unit for stabilization. On April 26, 2018, pt complained of anxiety in the afternoon. He was " encouraged to practice a Freeze Frame Exercise to address his anxiety and to work on AA materials on the unit.     Medications     Current Facility-Administered Medications Ordered in Epic   Medication Dose Route Frequency Last Rate Last Dose     atorvastatin (LIPITOR) tablet 40 mg  40 mg Oral Daily   40 mg at 04/26/18 0905     glucose gel 15-30 g  15-30 g Oral Q15 Min PRN        Or     dextrose 50 % injection 25-50 mL  25-50 mL Intravenous Q15 Min PRN        Or     glucagon injection 1 mg  1 mg Subcutaneous Q15 Min PRN         eszopiclone (LUNESTA) tablet 3 mg  3 mg Oral At Bedtime   3 mg at 04/25/18 2137     ibuprofen (ADVIL/MOTRIN) tablet 800 mg  800 mg Oral TID PRN   800 mg at 04/26/18 1211     insulin aspart (NovoLOG) inj (RAPID ACTING)  1-7 Units Subcutaneous TID AC   1 Units at 04/26/18 1243     insulin aspart (NovoLOG) inj (RAPID ACTING)  1-5 Units Subcutaneous At Bedtime   2 Units at 04/18/18 2105     irbesartan (AVAPRO) tablet 150 mg  150 mg Oral Daily   150 mg at 04/26/18 0906     ketorolac (TORADOL) injection 30 mg  30 mg Intravenous Q6H PRN   30 mg at 04/25/18 0610     lactated ringers infusion  500 mL Intravenous Continuous 25 mL/hr at 04/25/18 0610 500 mL at 04/25/18 0610     levothyroxine (SYNTHROID/LEVOTHROID) tablet 75 mcg  75 mcg Oral Daily   75 mcg at 04/26/18 0905     lidocaine 1 % 1 mL  1 mL Other Q1H PRN   1 mL at 04/20/18 0624     lidocaine 1 % 1 mL  1 mL Other Q1H PRN   1 mL at 04/25/18 0611     liraglutide (VICTOZA) injection 1.2 mg  1.2 mg Subcutaneous Daily   1.2 mg at 04/25/18 1723     metFORMIN (GLUCOPHAGE) tablet 1,000 mg  1,000 mg Oral BID w/meals   1,000 mg at 04/26/18 0905     mirtazapine (REMERON) tablet 45 mg  45 mg Oral At Bedtime   45 mg at 04/25/18 2137     nicotine polacrilex (COMMIT) lozenge 4-8 mg  4-8 mg Buccal Q1H PRN   8 mg at 04/26/18 1211     ondansetron (ZOFRAN-ODT) ODT tab 4 mg  4 mg Oral Q8H PRN         PARoxetine (PAXIL) tablet 10 mg  10 mg Oral Daily   10 mg at  "04/26/18 0905     PHENobarbital (LUMINAL) tablet 32.4 mg  32.4 mg Oral At Bedtime   32.4 mg at 04/25/18 2021     QUEtiapine (SEROquel) tablet 200-400 mg  200-400 mg Oral At Bedtime   400 mg at 04/25/18 2137     traZODone (DESYREL) tablet 50 mg  50 mg Oral At Bedtime   50 mg at 04/25/18 2137     vortioxetine (TRINTELLIX/BRINTELLIX) tablet 20 mg  20 mg Oral Daily   20 mg at 04/26/18 0906     Current Outpatient Prescriptions Ordered in Epic   Medication     liraglutide (VICTOZA PEN) 18 MG/3ML soln         Allergies      No Known Allergies     Medical Review of Systems     /86  Pulse 77  Temp 97.8  F (36.6  C) (Oral)  Resp 16  Ht 1.778 m (5' 10\")  Wt 101.4 kg (223 lb 8 oz)  SpO2 93%  BMI 32.07 kg/m2  Body mass index is 32.07 kg/(m^2).  A 10-point review of systems was performed by Irving Pappas MD and is negative, no new findings.      Psychiatric Examination     Appearance Sitting in chair, dressed in casual clothes. Appears stated age.   Attitude Cooperative, pleasant   Orientation Oriented to person, place, time   Eye Contact Good   Speech Regular rate, rhythm, volume and tone   Language Normal   Psychomotor Behavior Appropriate   Mood Less depressed, endorses some anxiety   Affect Broader range   Thought Process Goal-Oriented, Intact   Associations Intact   Thought Content Patient is currently negative for suicidal ideation, negative for plan or intent, able to contract no self harm and identify barriers to suicide.  Negative for obsessions, compulsions or psychosis.      Fund of Knowledge Average   Insight Slightly improved   Judgement Improving   Attention Span & Concentration Intact   Recent & Remote Memory Fair   Gait Normal   Muscle Tone Intact        Labs     Labs reviewed.  Recent Results (from the past 24 hour(s))   Glucose by meter    Collection Time: 04/25/18  5:07 PM   Result Value Ref Range    Glucose 137 (H) 70 - 99 mg/dL   Glucose by meter    Collection Time: 04/25/18  9:31 PM "   Result Value Ref Range    Glucose 118 (H) 70 - 99 mg/dL   Glucose by meter    Collection Time: 04/26/18  8:27 AM   Result Value Ref Range    Glucose 111 (H) 70 - 99 mg/dL   Glucose by meter    Collection Time: 04/26/18 12:27 PM   Result Value Ref Range    Glucose 141 (H) 70 - 99 mg/dL        Impression     This is a 63 year old male with a history of depression and anxiety who was directly admitted to [facility] for worsening depression and Ativan abuse. Discussed pt beginning ECT for treatment-resistant depression. Explained to pt the benefits, side-effects and complications of the procedure. Pt gave verbal consent to series of 6 bilateral ECT treatments starting 4/18/18. ECT treatment plan created, HUC notified to schedule procedure.  He will have his second ECT on 4/20/18. He will also start on a phenobarbital taper.      Diagnoses     1. Major depression, recurrent, severe, without psychotic features.  2. Panic disorder without agoraphobia.   3. Sedative dependence.  4. Opiate use disorder, in remission.     Plan     1. Explained side effects, benefits, and complications of medications to the patient, Pt gave verbal consent.  2. Medication changes: Continue phenobarb taper. Add Seroquel 25-50mg q2h prn for anxiety.  3. Discussed treatment plan with patient and team.  4. Projected length of stay: Until ECT series has completed.  5. Fifth ECT on 4/27/18.  6. IOP at [facility] on discharge.      Attestation:   Patient has been seen and evaluated by me, Irving Pappas MD.    Patient ID:  Name: Tanner Villatoro  MRN: [id number]  Admission: 4/17/2018   YOB: 1955

## 2018-04-26 NOTE — PLAN OF CARE
"Problem: Depressive Symptoms  Goal: Depressive Symptoms  Signs and symptoms of listed problems will be absent or manageable.   1. Mood stability   2. Absence of suicidal ideation/contracts for safety   3. Depressive s/sx resolved  4. Safety plan in place  5. Positive coping skills identified/utilized  6. Medication regiment established/compliance  7. Adequate sleep  8. Housing community support  9. F/u plan in place     Outcome: No Change  Pt slept in until 1000, ate breakfast, went back to room to rest. Pt is isolative and withdrawn to room. Brightens on approach. Pt states \"I don't feel any better and I don't want to leave, I'm scared ECT won't work on me.\" Pt went to one group for a few minutes, but has not attended any others. Pt is seen in hallway this afternoon. Med compliant and cooperative.       "

## 2018-04-27 ENCOUNTER — ANESTHESIA EVENT (OUTPATIENT)
Dept: SURGERY | Facility: CLINIC | Age: 63
End: 2018-04-27

## 2018-04-27 ENCOUNTER — ANESTHESIA (OUTPATIENT)
Dept: SURGERY | Facility: CLINIC | Age: 63
End: 2018-04-27

## 2018-04-27 LAB
GLUCOSE BLDC GLUCOMTR-MCNC: 133 MG/DL (ref 70–99)
GLUCOSE BLDC GLUCOMTR-MCNC: 150 MG/DL (ref 70–99)
GLUCOSE BLDC GLUCOMTR-MCNC: 153 MG/DL (ref 70–99)
GLUCOSE BLDC GLUCOMTR-MCNC: 154 MG/DL (ref 70–99)

## 2018-04-27 PROCEDURE — 25000128 H RX IP 250 OP 636: Performed by: NURSE ANESTHETIST, CERTIFIED REGISTERED

## 2018-04-27 PROCEDURE — 25000132 ZZH RX MED GY IP 250 OP 250 PS 637: Performed by: PHYSICIAN ASSISTANT

## 2018-04-27 PROCEDURE — 00000146 ZZHCL STATISTIC GLUCOSE BY METER IP

## 2018-04-27 PROCEDURE — 25000125 ZZHC RX 250: Performed by: NURSE ANESTHETIST, CERTIFIED REGISTERED

## 2018-04-27 PROCEDURE — 40000010 ZZH STATISTIC ANES STAT CODE-CRNA PER MINUTE

## 2018-04-27 PROCEDURE — 25000132 ZZH RX MED GY IP 250 OP 250 PS 637: Performed by: PSYCHIATRY & NEUROLOGY

## 2018-04-27 PROCEDURE — 25000132 ZZH RX MED GY IP 250 OP 250 PS 637: Performed by: INTERNAL MEDICINE

## 2018-04-27 PROCEDURE — 12400006 ZZH R&B MH INTERMEDIATE

## 2018-04-27 PROCEDURE — 90870 ELECTROCONVULSIVE THERAPY: CPT

## 2018-04-27 PROCEDURE — 25000125 ZZHC RX 250: Performed by: ANESTHESIOLOGY

## 2018-04-27 PROCEDURE — 37000008 ZZH ANESTHESIA TECHNICAL FEE, 1ST 30 MIN

## 2018-04-27 PROCEDURE — 25000128 H RX IP 250 OP 636: Performed by: PSYCHIATRY & NEUROLOGY

## 2018-04-27 PROCEDURE — 25000131 ZZH RX MED GY IP 250 OP 636 PS 637: Performed by: NURSE ANESTHETIST, CERTIFIED REGISTERED

## 2018-04-27 RX ORDER — KETOROLAC TROMETHAMINE 30 MG/ML
30 INJECTION, SOLUTION INTRAMUSCULAR; INTRAVENOUS EVERY 6 HOURS PRN
Status: DISCONTINUED | OUTPATIENT
Start: 2018-04-27 | End: 2018-04-30

## 2018-04-27 RX ORDER — LABETALOL HYDROCHLORIDE 5 MG/ML
INJECTION, SOLUTION INTRAVENOUS PRN
Status: DISCONTINUED | OUTPATIENT
Start: 2018-04-27 | End: 2018-04-27

## 2018-04-27 RX ORDER — SODIUM CHLORIDE, SODIUM LACTATE, POTASSIUM CHLORIDE, CALCIUM CHLORIDE 600; 310; 30; 20 MG/100ML; MG/100ML; MG/100ML; MG/100ML
INJECTION, SOLUTION INTRAVENOUS CONTINUOUS PRN
Status: DISCONTINUED | OUTPATIENT
Start: 2018-04-27 | End: 2018-04-27

## 2018-04-27 RX ORDER — ESMOLOL HYDROCHLORIDE 10 MG/ML
INJECTION INTRAVENOUS PRN
Status: DISCONTINUED | OUTPATIENT
Start: 2018-04-27 | End: 2018-04-27

## 2018-04-27 RX ORDER — SODIUM CHLORIDE, SODIUM LACTATE, POTASSIUM CHLORIDE, CALCIUM CHLORIDE 600; 310; 30; 20 MG/100ML; MG/100ML; MG/100ML; MG/100ML
500 INJECTION, SOLUTION INTRAVENOUS CONTINUOUS
Status: DISCONTINUED | OUTPATIENT
Start: 2018-04-27 | End: 2018-04-28

## 2018-04-27 RX ADMIN — LABETALOL HYDROCHLORIDE 10 MG: 5 INJECTION INTRAVENOUS at 06:52

## 2018-04-27 RX ADMIN — LABETALOL HYDROCHLORIDE 10 MG: 5 INJECTION INTRAVENOUS at 06:55

## 2018-04-27 RX ADMIN — KETOROLAC TROMETHAMINE 30 MG: 30 INJECTION, SOLUTION INTRAMUSCULAR at 06:24

## 2018-04-27 RX ADMIN — IRBESARTAN 150 MG: 150 TABLET ORAL at 09:21

## 2018-04-27 RX ADMIN — NICOTINE POLACRILEX 8 MG: 4 LOZENGE ORAL at 21:34

## 2018-04-27 RX ADMIN — MIRTAZAPINE 45 MG: 45 TABLET, FILM COATED ORAL at 21:31

## 2018-04-27 RX ADMIN — VORTIOXETINE 20 MG: 20 TABLET, FILM COATED ORAL at 09:20

## 2018-04-27 RX ADMIN — METFORMIN HYDROCHLORIDE 1000 MG: 500 TABLET ORAL at 09:20

## 2018-04-27 RX ADMIN — METHOHEXITAL SODIUM 100 MG: 500 INJECTION, POWDER, LYOPHILIZED, FOR SOLUTION INTRAMUSCULAR; INTRAVENOUS; RECTAL at 06:48

## 2018-04-27 RX ADMIN — SUCCINYLCHOLINE CHLORIDE 100 MG: 20 INJECTION, SOLUTION INTRAMUSCULAR; INTRAVENOUS; PARENTERAL at 06:48

## 2018-04-27 RX ADMIN — QUETIAPINE FUMARATE 50 MG: 25 TABLET ORAL at 14:08

## 2018-04-27 RX ADMIN — ESMOLOL HYDROCHLORIDE 30 MG: 10 INJECTION, SOLUTION INTRAVENOUS at 06:51

## 2018-04-27 RX ADMIN — LEVOTHYROXINE SODIUM 75 MCG: 75 TABLET ORAL at 09:21

## 2018-04-27 RX ADMIN — SODIUM CHLORIDE, POTASSIUM CHLORIDE, SODIUM LACTATE AND CALCIUM CHLORIDE: 600; 310; 30; 20 INJECTION, SOLUTION INTRAVENOUS at 06:46

## 2018-04-27 RX ADMIN — ESZOPICLONE 3 MG: 3 TABLET, FILM COATED ORAL at 21:32

## 2018-04-27 RX ADMIN — LIDOCAINE HYDROCHLORIDE 1 ML: 10 INJECTION, SOLUTION EPIDURAL; INFILTRATION; INTRACAUDAL; PERINEURAL at 06:24

## 2018-04-27 RX ADMIN — NICOTINE POLACRILEX 8 MG: 4 LOZENGE ORAL at 18:35

## 2018-04-27 RX ADMIN — IBUPROFEN 800 MG: 400 TABLET ORAL at 21:32

## 2018-04-27 RX ADMIN — METFORMIN HYDROCHLORIDE 1000 MG: 500 TABLET ORAL at 18:03

## 2018-04-27 RX ADMIN — PHENOBARBITAL 32.4 MG: 32.4 TABLET ORAL at 20:11

## 2018-04-27 RX ADMIN — NICOTINE POLACRILEX 6 MG: 4 LOZENGE ORAL at 20:12

## 2018-04-27 RX ADMIN — LIRAGLUTIDE 1.2 MG: 6 INJECTION SUBCUTANEOUS at 18:06

## 2018-04-27 RX ADMIN — INSULIN ASPART 1 UNITS: 100 INJECTION, SOLUTION INTRAVENOUS; SUBCUTANEOUS at 18:06

## 2018-04-27 RX ADMIN — INSULIN ASPART 1 UNITS: 100 INJECTION, SOLUTION INTRAVENOUS; SUBCUTANEOUS at 12:22

## 2018-04-27 RX ADMIN — PAROXETINE HYDROCHLORIDE 10 MG: 10 TABLET, FILM COATED ORAL at 09:21

## 2018-04-27 RX ADMIN — NICOTINE POLACRILEX 8 MG: 4 LOZENGE ORAL at 09:42

## 2018-04-27 RX ADMIN — IBUPROFEN 800 MG: 400 TABLET ORAL at 14:08

## 2018-04-27 RX ADMIN — NICOTINE POLACRILEX 8 MG: 4 LOZENGE ORAL at 16:51

## 2018-04-27 RX ADMIN — TRAZODONE HYDROCHLORIDE 50 MG: 50 TABLET ORAL at 21:32

## 2018-04-27 RX ADMIN — ATORVASTATIN CALCIUM 40 MG: 40 TABLET, FILM COATED ORAL at 09:21

## 2018-04-27 RX ADMIN — QUETIAPINE FUMARATE 400 MG: 200 TABLET ORAL at 21:31

## 2018-04-27 RX ADMIN — NICOTINE POLACRILEX 8 MG: 4 LOZENGE ORAL at 14:08

## 2018-04-27 ASSESSMENT — ACTIVITIES OF DAILY LIVING (ADL)
GROOMING: INDEPENDENT
DRESS: INDEPENDENT
ORAL_HYGIENE: INDEPENDENT

## 2018-04-27 ASSESSMENT — LIFESTYLE VARIABLES: TOBACCO_USE: 1

## 2018-04-27 NOTE — ADDENDUM NOTE
Addendum  created 04/27/18 0740 by Eric Naylor MD    Anesthesia Review and Sign - Ready for Procedure, Anesthesia Review and Sign - Signed

## 2018-04-27 NOTE — PLAN OF CARE
Problem: Depressive Symptoms  Goal: Depressive Symptoms  Signs and symptoms of listed problems will be absent or manageable.   1. Mood stability   2. Absence of suicidal ideation/contracts for safety   3. Depressive s/sx resolved  4. Safety plan in place  5. Positive coping skills identified/utilized  6. Medication regiment established/compliance  7. Adequate sleep  8. Housing community support  9. F/u plan in place     Outcome: No Change  Pt had ECT this morning. Pt present a blunt, flat affect. Pt did attended morning meeting and participated appropriately. Pt showed some sense of humor during community meeting. Pt stated that he felt better today, just tired still. Pt spent most of the shift resting in bed. Pt ate most of his breakfast and all of his lunch. Pt was prompted to do the freeze frames exercise during the shift, pt said that he would give it a try. Pt was med compliant.

## 2018-04-27 NOTE — ANESTHESIA PREPROCEDURE EVALUATION
"Procedure: * No procedures listed *  Preop diagnosis: * No pre-op diagnosis entered *  No Known Allergies  Patient Active Problem List   Diagnosis     Anxiety and depression     Past Medical History:   Diagnosis Date     Anxiety      Coronary artery disease     stent x 2     Diabetes mellitus (H)     type 2     Headaches      Heart attack      Hyperlipemia      Hypertension      Insomnia, unspecified      Sleep apnea      Past Surgical History:   Procedure Laterality Date     CARDIAC SURGERY      stent x2     ENDOSCOPIC BALLOON SINUPLASTY ACCLARENT  5/24/2012    Procedure:ENDOSCOPIC BALLOON SINUPLASTY ACCLARENT; BILATERAL ENDOSCOPIC ETHMOIDECTOMY, MAXILLARY ANTROSTOMY, FRONTAL SINUSOSTOMY BILATERAL ; Surgeon:ONI CARRERA; Location:[facility]     GENITOURINARY SURGERY      vasectomy     ORTHOPEDIC SURGERY      ankle cystectomy       No current facility-administered medications on file prior to encounter.   Current Outpatient Prescriptions on File Prior to Encounter:  ASPIRIN PO Take 81 mg by mouth daily    Atorvastatin Calcium (LIPITOR PO) Take 40 mg by mouth daily.   LEVOTHYROXINE SODIUM PO Take 75 mcg by mouth daily.   METFORMIN HCL PO Take 1,000 mg by mouth daily (with dinner)    PARoxetine HCl (PAXIL PO) Take 20 mg by mouth daily      /64  Pulse 62  Temp 36.5  C (97.7  F) (Oral)  Resp 20  Ht 1.778 m (5' 10\")  Wt 101.4 kg (223 lb 8 oz)  SpO2 93%  BMI 32.07 kg/m2    Lab Results   Component Value Date    WBC 14.0 04/17/2018     Lab Results   Component Value Date    RBC 5.02 04/17/2018     Lab Results   Component Value Date    HGB 16.2 04/17/2018     Lab Results   Component Value Date    HCT 44.7 04/17/2018     Lab Results   Component Value Date    MCV 89 04/17/2018     Lab Results   Component Value Date    MCH 32.3 04/17/2018     Lab Results   Component Value Date    MCHC 36.2 04/17/2018     Lab Results   Component Value Date    RDW 12.7 04/17/2018     Lab Results   Component Value Date     " 04/17/2018     No results found for: INR    Last Basic Metabolic Panel:  Lab Results   Component Value Date     04/17/2018      Lab Results   Component Value Date    POTASSIUM 4.1 04/17/2018     Lab Results   Component Value Date    CHLORIDE 101 04/17/2018     Lab Results   Component Value Date    ROB 9.0 04/17/2018     Lab Results   Component Value Date    CO2 23 04/17/2018     Lab Results   Component Value Date    BUN 7 04/17/2018     Lab Results   Component Value Date    CR 0.66 04/17/2018     Lab Results   Component Value Date     04/17/2018     EKG   17-APR-2018 23:15:34 [facility] ROUTINE RECORD  Sinus rhythm  Possible Inferior infarct , age undetermined  Abnormal ECG  No previous ECGs available    Anesthesia Evaluation     . Pt has had prior anesthetic.     No history of anesthetic complications          ROS/MED HX    ENT/Pulmonary:     (+)sleep apnea, tobacco use, Past use , . .    Neurologic:       Cardiovascular:     (+) Dyslipidemia, hypertension-range: controlled, -CAD, -past MI,-stent,2010  2 . : . . . :. .      (-) angina and angina   METS/Exercise Tolerance:     Hematologic:         Musculoskeletal:         GI/Hepatic:        (-) GERD   Renal/Genitourinary:         Endo:     (+) type II DM thyroid problem hypothyroidism, .      Psychiatric: Comment: insomnia    (+) psychiatric history anxiety and depression      Infectious Disease:         Malignancy:         Other:                                    Anesthesia Plan      History & Physical Review  History and physical reviewed and following examination; no interval change.    ASA Status:  3 .    NPO Status:  > 8 hours    Plan for General with Other induction.     Last treatment 4/25/2018  sux 100 mg  Brevital 100 mg  Esmolol 30  Labetalol 10       Postoperative Care  Postoperative pain management:  IV analgesics.      Consents  Anesthetic plan, risks, benefits and alternatives discussed with:  Patient..                           .

## 2018-04-27 NOTE — PLAN OF CARE
Problem: Patient Care Overview  Goal: Discharge Needs Assessment  Patient attended Process Group on Thursday 04/26/18 and participated appropriately. Patient demonstrated good insight into the topic of discussion.

## 2018-04-27 NOTE — ANESTHESIA POSTPROCEDURE EVALUATION
Patient: Tanner Villatoro    * No procedures listed *    Diagnosis:* No pre-op diagnosis entered *  Diagnosis Additional Information: No value filed.    Anesthesia Type:  General    Note:  Anesthesia Post Evaluation    Patient location during evaluation: PACU  Patient participation: Able to fully participate in evaluation  Level of consciousness: awake  Pain management: adequate  Airway patency: patent  Cardiovascular status: acceptable  Respiratory status: acceptable  Hydration status: acceptable  PONV: none     Anesthetic complications: None          Last vitals:  Vitals:    04/27/18 0720 04/27/18 0725 04/27/18 0730   BP: 132/77 115/62 120/61   Pulse:      Resp: 22 21 11   Temp:   36.4  C (97.5  F)   SpO2: 93% 94% 94%         Electronically Signed By: Eric Naylor MD  April 27, 2018  7:40 AM

## 2018-04-28 LAB
GLUCOSE BLDC GLUCOMTR-MCNC: 111 MG/DL (ref 70–99)
GLUCOSE BLDC GLUCOMTR-MCNC: 114 MG/DL (ref 70–99)
GLUCOSE BLDC GLUCOMTR-MCNC: 119 MG/DL (ref 70–99)
GLUCOSE BLDC GLUCOMTR-MCNC: 161 MG/DL (ref 70–99)

## 2018-04-28 PROCEDURE — 25000132 ZZH RX MED GY IP 250 OP 250 PS 637: Performed by: PSYCHIATRY & NEUROLOGY

## 2018-04-28 PROCEDURE — 12400006 ZZH R&B MH INTERMEDIATE

## 2018-04-28 PROCEDURE — 00000146 ZZHCL STATISTIC GLUCOSE BY METER IP

## 2018-04-28 PROCEDURE — 25000132 ZZH RX MED GY IP 250 OP 250 PS 637: Performed by: PHYSICIAN ASSISTANT

## 2018-04-28 PROCEDURE — 25000132 ZZH RX MED GY IP 250 OP 250 PS 637: Performed by: INTERNAL MEDICINE

## 2018-04-28 RX ORDER — HYDROXYZINE HYDROCHLORIDE 25 MG/1
25-50 TABLET, FILM COATED ORAL EVERY 4 HOURS PRN
Status: DISCONTINUED | OUTPATIENT
Start: 2018-04-28 | End: 2018-05-01 | Stop reason: HOSPADM

## 2018-04-28 RX ADMIN — NICOTINE POLACRILEX 8 MG: 4 LOZENGE ORAL at 12:15

## 2018-04-28 RX ADMIN — MIRTAZAPINE 45 MG: 45 TABLET, FILM COATED ORAL at 21:57

## 2018-04-28 RX ADMIN — LEVOTHYROXINE SODIUM 75 MCG: 75 TABLET ORAL at 09:24

## 2018-04-28 RX ADMIN — NICOTINE POLACRILEX 8 MG: 4 LOZENGE ORAL at 16:19

## 2018-04-28 RX ADMIN — IBUPROFEN 800 MG: 400 TABLET ORAL at 19:50

## 2018-04-28 RX ADMIN — PAROXETINE HYDROCHLORIDE 10 MG: 10 TABLET, FILM COATED ORAL at 09:23

## 2018-04-28 RX ADMIN — ATORVASTATIN CALCIUM 40 MG: 40 TABLET, FILM COATED ORAL at 09:24

## 2018-04-28 RX ADMIN — NICOTINE POLACRILEX 8 MG: 4 LOZENGE ORAL at 09:28

## 2018-04-28 RX ADMIN — TRAZODONE HYDROCHLORIDE 50 MG: 50 TABLET ORAL at 21:57

## 2018-04-28 RX ADMIN — LIRAGLUTIDE 1.2 MG: 6 INJECTION SUBCUTANEOUS at 17:17

## 2018-04-28 RX ADMIN — PHENOBARBITAL 32.4 MG: 32.4 TABLET ORAL at 20:16

## 2018-04-28 RX ADMIN — IRBESARTAN 150 MG: 150 TABLET ORAL at 09:24

## 2018-04-28 RX ADMIN — IBUPROFEN 800 MG: 400 TABLET ORAL at 12:15

## 2018-04-28 RX ADMIN — QUETIAPINE FUMARATE 25 MG: 25 TABLET ORAL at 14:12

## 2018-04-28 RX ADMIN — METFORMIN HYDROCHLORIDE 1000 MG: 500 TABLET ORAL at 09:23

## 2018-04-28 RX ADMIN — NICOTINE POLACRILEX 8 MG: 4 LOZENGE ORAL at 18:18

## 2018-04-28 RX ADMIN — METFORMIN HYDROCHLORIDE 1000 MG: 500 TABLET ORAL at 17:17

## 2018-04-28 RX ADMIN — NICOTINE POLACRILEX 8 MG: 4 LOZENGE ORAL at 14:14

## 2018-04-28 RX ADMIN — INSULIN ASPART 1 UNITS: 100 INJECTION, SOLUTION INTRAVENOUS; SUBCUTANEOUS at 12:19

## 2018-04-28 RX ADMIN — NICOTINE POLACRILEX 8 MG: 4 LOZENGE ORAL at 21:25

## 2018-04-28 RX ADMIN — ESZOPICLONE 3 MG: 3 TABLET, FILM COATED ORAL at 21:57

## 2018-04-28 RX ADMIN — QUETIAPINE FUMARATE 400 MG: 200 TABLET ORAL at 21:58

## 2018-04-28 RX ADMIN — VORTIOXETINE 20 MG: 20 TABLET, FILM COATED ORAL at 09:24

## 2018-04-28 RX ADMIN — QUETIAPINE FUMARATE 50 MG: 25 TABLET ORAL at 16:19

## 2018-04-28 ASSESSMENT — ACTIVITIES OF DAILY LIVING (ADL)
ORAL_HYGIENE: INDEPENDENT
ORAL_HYGIENE: INDEPENDENT
GROOMING: INDEPENDENT
DRESS: INDEPENDENT
DRESS: INDEPENDENT
GROOMING: INDEPENDENT

## 2018-04-28 NOTE — PLAN OF CARE
Pt visible this shift, pleasant and cooperative. During 1:1 Pt said he was not sure if ECT was working. He said he will know for sure once he gets home to his daily stressors. He said his son noticed improvement, however. Pt also reflected on how it was a mistake to have  his wife 4 years ago. 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 Would like to try vistaril PRN for his afternoon anxiety. Said he already gets 400mg Seroquel at HS so he doesn't think his 50mg dose of PRN Seroquel helps his afternoon anxiety. Phenobarb taper down to once a day at HS.

## 2018-04-28 NOTE — PLAN OF CARE
Problem: General Plan of Care (Inpatient Behavioral)  Goal: Individualization/Patient Specific Goal (IP Behavioral)  The patient and/or their representative will achieve their patient-specific goals related to the plan of care.    The patient-specific goals include:  1.  Absence of suicidal ideation and safety plan in place  2.  Mood stability  3.  Completes and demonstrates improvement after series of ECT  4.  Plan in place to maintain sobriety  5.  Medication regime established  6.  OP follow up established   Outcome: No Change  Pt was present in the unit but spent most of the early part of the shift resting in his room. Declines attending groups although c/o bored. Pt aware of plan d/c on Monday after the last ECT. Pt stated that he was not very excited going home as he might be going to face the reality of live and is afraid of sliding back again. Pt could not however not able to state any stressors. Staff encourage pt to use a calender planner to help him plan thins one at a time.  Affect is blunt although with calm mood. Pt is cooperative and respectful to both staff and peers. Continues to express regret of the poor decision to divorce his wife 4 years ago.

## 2018-04-28 NOTE — PROGRESS NOTES
[facility] Psychiatric Progress Note       Interim History     The patient's care was discussed with the treatment team and chart notes were reviewed. Pt seen on SDU. Tolerating medications without side effects. Side effects, risks, and benefits of medications reviewed with patient. Pt had his fifth ECT this morning, no complications. Final ECT on Monday. He is feeling slightly less depressed and continues to improve with each treatment. He will remain on the unit until his ECT has completed.     Hospital Course     On April 18, 2018, pt was directly admitted to [facility] for worsening depression and misuse of benzodiazepines, being prescribed several prescriptions from two different providers. He was started on a series of ECT and placed on a phenobarbital taper at 32.4mg tid for benzodiazepine withdrawal. On April 19, 2018, pt reported he was starting to feel better with ECT and his medications. He will have his second ECT tomorrow and continue with phenobarbital. On April 20, 2018, pt had his second ECT without complications. He will continue on phenobarbital and ECT series. On April 23, 2018, pt had his third ECT without complications. Phenobarbital was decreased to 32.4mg bid bid for two days. On April 24, 2018, pt tolerated the decrease in phenobarbital, he will have his fourth ECT tomorrow. It was recommended that he attend [facility] on discharge. Paxil will be decreased to 10mg qam. On April 25, 2018, pt had his fourth ECT without complications. Phenobarbital will decrease to 32.4mg qhs for two nights. He will remain on the unit for stabilization. On April 26, 2018, pt complained of anxiety in the afternoon. He was encouraged to practice a Freeze Frame Exercise to address his anxiety and to work on AA materials on the unit. On April 27, 2018, pt had his fifth ECT and will remain on the unit until his final ECT on Monday.     Medications     Current Facility-Administered  Medications Ordered in Epic   Medication Dose Route Frequency Last Rate Last Dose     atorvastatin (LIPITOR) tablet 40 mg  40 mg Oral Daily   40 mg at 04/27/18 0921     glucose gel 15-30 g  15-30 g Oral Q15 Min PRN        Or     dextrose 50 % injection 25-50 mL  25-50 mL Intravenous Q15 Min PRN        Or     glucagon injection 1 mg  1 mg Subcutaneous Q15 Min PRN         eszopiclone (LUNESTA) tablet 3 mg  3 mg Oral At Bedtime   3 mg at 04/26/18 2107     ibuprofen (ADVIL/MOTRIN) tablet 800 mg  800 mg Oral TID PRN   800 mg at 04/27/18 1408     insulin aspart (NovoLOG) inj (RAPID ACTING)  1-7 Units Subcutaneous TID AC   1 Units at 04/27/18 1806     insulin aspart (NovoLOG) inj (RAPID ACTING)  1-5 Units Subcutaneous At Bedtime   2 Units at 04/18/18 2105     irbesartan (AVAPRO) tablet 150 mg  150 mg Oral Daily   150 mg at 04/27/18 0921     ketorolac (TORADOL) injection 30 mg  30 mg Intravenous Q6H PRN   30 mg at 04/27/18 0624     ketorolac (TORADOL) injection 30 mg  30 mg Intravenous Q6H PRN         lactated ringers infusion  500 mL Intravenous Continuous 25 mL/hr at 04/25/18 0610 500 mL at 04/25/18 0610     lactated ringers infusion  500 mL Intravenous Continuous         levothyroxine (SYNTHROID/LEVOTHROID) tablet 75 mcg  75 mcg Oral Daily   75 mcg at 04/27/18 0921     lidocaine 1 % 1 mL  1 mL Other Q1H PRN   1 mL at 04/20/18 0624     lidocaine 1 % 1 mL  1 mL Other Q1H PRN   1 mL at 04/25/18 0611     lidocaine 1 % 1 mL  1 mL Other Q1H PRN   1 mL at 04/27/18 0624     liraglutide (VICTOZA) injection 1.2 mg  1.2 mg Subcutaneous Daily   1.2 mg at 04/27/18 1806     metFORMIN (GLUCOPHAGE) tablet 1,000 mg  1,000 mg Oral BID w/meals   1,000 mg at 04/27/18 1803     mirtazapine (REMERON) tablet 45 mg  45 mg Oral At Bedtime   45 mg at 04/26/18 2107     nicotine polacrilex (COMMIT) lozenge 4-8 mg  4-8 mg Buccal Q1H PRN   8 mg at 04/27/18 5672     ondansetron (ZOFRAN-ODT) ODT tab 4 mg  4 mg Oral Q8H PRN         PARoxetine (PAXIL)  "tablet 10 mg  10 mg Oral Daily   10 mg at 04/27/18 0921     PHENobarbital (LUMINAL) tablet 32.4 mg  32.4 mg Oral At Bedtime   32.4 mg at 04/26/18 2107     QUEtiapine (SEROquel) tablet 200-400 mg  200-400 mg Oral At Bedtime   400 mg at 04/26/18 2107     QUEtiapine (SEROquel) tablet 25-50 mg  25-50 mg Oral Q2H PRN   50 mg at 04/27/18 1408     traZODone (DESYREL) tablet 50 mg  50 mg Oral At Bedtime   50 mg at 04/26/18 2106     vortioxetine (TRINTELLIX/BRINTELLIX) tablet 20 mg  20 mg Oral Daily   20 mg at 04/27/18 0920     Current Outpatient Prescriptions Ordered in Epic   Medication     liraglutide (VICTOZA PEN) 18 MG/3ML soln         Allergies      No Known Allergies     Medical Review of Systems     /74  Pulse 64  Temp 98.1  F (36.7  C) (Oral)  Resp 16  Ht 1.778 m (5' 10\")  Wt 101.4 kg (223 lb 8 oz)  SpO2 94%  BMI 32.07 kg/m2  Body mass index is 32.07 kg/(m^2).  A 10-point review of systems was performed by Irving Pappas MD and is negative, no new findings.      Psychiatric Examination     Appearance Laying in bed, dressed in scrubs. Appears stated age.   Attitude Cooperative, pleasant, tired   Orientation Oriented to person, place, time   Eye Contact Good   Speech Regular rate, rhythm, volume and tone   Language Normal   Psychomotor Behavior Appropriate   Mood Less depressed, slightly less anxiety   Affect Broader range   Thought Process Goal-Oriented, Intact   Associations Intact   Thought Content Patient is currently negative for suicidal ideation, negative for plan or intent, able to contract no self harm and identify barriers to suicide.  Negative for obsessions, compulsions or psychosis.      Fund of Knowledge Average   Insight Improving   Judgement Improving   Attention Span & Concentration Intact   Recent & Remote Memory Fair   Gait Normal   Muscle Tone Intact        Labs     Labs reviewed.  Recent Results (from the past 24 hour(s))   Glucose by meter    Collection Time: 04/26/18  9:03 PM "   Result Value Ref Range    Glucose 114 (H) 70 - 99 mg/dL   Glucose by meter    Collection Time: 04/27/18  8:03 AM   Result Value Ref Range    Glucose 133 (H) 70 - 99 mg/dL   Glucose by meter    Collection Time: 04/27/18 12:12 PM   Result Value Ref Range    Glucose 150 (H) 70 - 99 mg/dL   Glucose by meter    Collection Time: 04/27/18  5:26 PM   Result Value Ref Range    Glucose 154 (H) 70 - 99 mg/dL        Impression     This is a 63 year old male with a history of depression and anxiety who was directly admitted to [facility] for worsening depression and Ativan abuse. Discussed pt beginning ECT for treatment-resistant depression. Explained to pt the benefits, side-effects and complications of the procedure. Pt gave verbal consent to series of 6 bilateral ECT treatments starting 4/18/18. ECT treatment plan created, [facility] notified to schedule procedure.        Diagnoses     1. Major depression, recurrent, severe, without psychotic features.  2. Panic disorder without agoraphobia.   3. Sedative dependence.  4. Opiate use disorder, in remission.     Plan     1. Explained side effects, benefits, and complications of medications to the patient, Pt gave verbal consent.  2. Medication changes: Continue phenobarb taper. Add Seroquel 25-50mg q2h prn for anxiety.  3. Discussed treatment plan with patient and team.  4. Projected length of stay: Until ECT series has completed.  5. SIxth ECT on 4/30/18.  6. IOP at [facility] on discharge.      Attestation:   Patient has been seen and evaluated by me, Irving Pappas MD.    Patient ID:  Name: Tanner Villatoro  MRN: [id number]  Admission: 4/17/2018   YOB: 1955

## 2018-04-29 LAB
GLUCOSE BLDC GLUCOMTR-MCNC: 139 MG/DL (ref 70–99)
GLUCOSE BLDC GLUCOMTR-MCNC: 149 MG/DL (ref 70–99)
GLUCOSE BLDC GLUCOMTR-MCNC: 152 MG/DL (ref 70–99)
GLUCOSE BLDC GLUCOMTR-MCNC: 95 MG/DL (ref 70–99)

## 2018-04-29 PROCEDURE — 00000146 ZZHCL STATISTIC GLUCOSE BY METER IP

## 2018-04-29 PROCEDURE — 25000132 ZZH RX MED GY IP 250 OP 250 PS 637: Performed by: PHYSICIAN ASSISTANT

## 2018-04-29 PROCEDURE — 25000132 ZZH RX MED GY IP 250 OP 250 PS 637: Performed by: INTERNAL MEDICINE

## 2018-04-29 PROCEDURE — 12400006 ZZH R&B MH INTERMEDIATE

## 2018-04-29 PROCEDURE — 25000132 ZZH RX MED GY IP 250 OP 250 PS 637: Performed by: PSYCHIATRY & NEUROLOGY

## 2018-04-29 RX ADMIN — LIRAGLUTIDE 1.2 MG: 6 INJECTION SUBCUTANEOUS at 17:24

## 2018-04-29 RX ADMIN — ATORVASTATIN CALCIUM 40 MG: 40 TABLET, FILM COATED ORAL at 09:24

## 2018-04-29 RX ADMIN — NICOTINE POLACRILEX 8 MG: 4 LOZENGE ORAL at 11:50

## 2018-04-29 RX ADMIN — QUETIAPINE FUMARATE 50 MG: 25 TABLET ORAL at 11:50

## 2018-04-29 RX ADMIN — PHENOBARBITAL 32.4 MG: 32.4 TABLET ORAL at 20:02

## 2018-04-29 RX ADMIN — QUETIAPINE FUMARATE 400 MG: 200 TABLET ORAL at 21:02

## 2018-04-29 RX ADMIN — NICOTINE POLACRILEX 8 MG: 4 LOZENGE ORAL at 17:53

## 2018-04-29 RX ADMIN — METFORMIN HYDROCHLORIDE 1000 MG: 500 TABLET ORAL at 09:25

## 2018-04-29 RX ADMIN — PAROXETINE HYDROCHLORIDE 10 MG: 10 TABLET, FILM COATED ORAL at 09:24

## 2018-04-29 RX ADMIN — NICOTINE POLACRILEX 8 MG: 4 LOZENGE ORAL at 09:41

## 2018-04-29 RX ADMIN — VORTIOXETINE 20 MG: 20 TABLET, FILM COATED ORAL at 09:24

## 2018-04-29 RX ADMIN — HYDROXYZINE HYDROCHLORIDE 50 MG: 25 TABLET ORAL at 13:47

## 2018-04-29 RX ADMIN — NICOTINE POLACRILEX 8 MG: 4 LOZENGE ORAL at 16:01

## 2018-04-29 RX ADMIN — TRAZODONE HYDROCHLORIDE 50 MG: 50 TABLET ORAL at 23:24

## 2018-04-29 RX ADMIN — IRBESARTAN 150 MG: 150 TABLET ORAL at 09:24

## 2018-04-29 RX ADMIN — NICOTINE POLACRILEX 8 MG: 4 LOZENGE ORAL at 20:09

## 2018-04-29 RX ADMIN — INSULIN ASPART 1 UNITS: 100 INJECTION, SOLUTION INTRAVENOUS; SUBCUTANEOUS at 17:26

## 2018-04-29 RX ADMIN — ESZOPICLONE 3 MG: 3 TABLET, FILM COATED ORAL at 21:02

## 2018-04-29 RX ADMIN — QUETIAPINE FUMARATE 50 MG: 25 TABLET ORAL at 16:00

## 2018-04-29 RX ADMIN — METFORMIN HYDROCHLORIDE 1000 MG: 500 TABLET ORAL at 17:28

## 2018-04-29 RX ADMIN — LEVOTHYROXINE SODIUM 75 MCG: 75 TABLET ORAL at 09:25

## 2018-04-29 RX ADMIN — IBUPROFEN 800 MG: 400 TABLET ORAL at 13:47

## 2018-04-29 RX ADMIN — NICOTINE POLACRILEX 8 MG: 4 LOZENGE ORAL at 13:47

## 2018-04-29 RX ADMIN — MIRTAZAPINE 45 MG: 45 TABLET, FILM COATED ORAL at 21:02

## 2018-04-29 ASSESSMENT — ACTIVITIES OF DAILY LIVING (ADL)
GROOMING: SHOWER;INDEPENDENT
ORAL_HYGIENE: INDEPENDENT
DRESS: SCRUBS (BEHAVIORAL HEALTH)

## 2018-04-29 NOTE — PLAN OF CARE
Problem: Depressive Symptoms  Goal: Depressive Symptoms  Signs and symptoms of listed problems will be absent or manageable.   1. Mood stability   2. Absence of suicidal ideation/contracts for safety   3. Depressive s/sx resolved  4. Safety plan in place  5. Positive coping skills identified/utilized  6. Medication regiment established/compliance  7. Adequate sleep  8. Housing community support  9. F/u plan in place     Outcome: No Change  Patient presents with a flat affect but brightens upon approach. Pt spent the majority of the shift in his room asleep. Pt refused groups and has had minimal interaction with staff and peers. Pt spent time visiting with his family in his room. PT expressed to his son that he isn't sure if he is ready to discharge yet and wont know when he will need a ride. Pt states he is apprehensive to leave and is unsure what his next step should be. Staff provided a safety plan for pt to complete.

## 2018-04-29 NOTE — PLAN OF CARE
Pt visible this shift in lounge with peers. Took PRN ibuprofen for headache at end of shift. Glad PRN vistaril is available to try for his anxiety that usually sets in after lunch. Still on phenobarb taper scheduled for [facility] only. Mood appears improved, smiling at times. Blood sugars this shift: 111 & 119

## 2018-04-30 ENCOUNTER — APPOINTMENT (OUTPATIENT)
Dept: GENERAL RADIOLOGY | Facility: CLINIC | Age: 63
DRG: 885 | End: 2018-04-30
Attending: ANESTHESIOLOGY
Payer: COMMERCIAL

## 2018-04-30 LAB
ANION GAP SERPL CALCULATED.3IONS-SCNC: 8 MMOL/L (ref 3–14)
BUN SERPL-MCNC: 18 MG/DL (ref 7–30)
CALCIUM SERPL-MCNC: 9.2 MG/DL (ref 8.5–10.1)
CHLORIDE SERPL-SCNC: 104 MMOL/L (ref 94–109)
CO2 SERPL-SCNC: 27 MMOL/L (ref 20–32)
CREAT SERPL-MCNC: 0.82 MG/DL (ref 0.66–1.25)
ERYTHROCYTE [DISTWIDTH] IN BLOOD BY AUTOMATED COUNT: 12.9 % (ref 10–15)
GFR SERPL CREATININE-BSD FRML MDRD: >90 ML/MIN/1.7M2
GLUCOSE BLDC GLUCOMTR-MCNC: 127 MG/DL (ref 70–99)
GLUCOSE BLDC GLUCOMTR-MCNC: 127 MG/DL (ref 70–99)
GLUCOSE BLDC GLUCOMTR-MCNC: 144 MG/DL (ref 70–99)
GLUCOSE BLDC GLUCOMTR-MCNC: 152 MG/DL (ref 70–99)
GLUCOSE SERPL-MCNC: 147 MG/DL (ref 70–99)
HCT VFR BLD AUTO: 45.1 % (ref 40–53)
HGB BLD-MCNC: 16 G/DL (ref 13.3–17.7)
MCH RBC QN AUTO: 32.4 PG (ref 26.5–33)
MCHC RBC AUTO-ENTMCNC: 35.5 G/DL (ref 31.5–36.5)
MCV RBC AUTO: 91 FL (ref 78–100)
PLATELET # BLD AUTO: 224 10E9/L (ref 150–450)
POTASSIUM SERPL-SCNC: 3.8 MMOL/L (ref 3.4–5.3)
RBC # BLD AUTO: 4.94 10E12/L (ref 4.4–5.9)
SODIUM SERPL-SCNC: 139 MMOL/L (ref 133–144)
WBC # BLD AUTO: 18.7 10E9/L (ref 4–11)

## 2018-04-30 PROCEDURE — 27210339 ZZH CIRCUIT HUMIDITY W/CPAP BIP

## 2018-04-30 PROCEDURE — 25000132 ZZH RX MED GY IP 250 OP 250 PS 637: Performed by: PHYSICIAN ASSISTANT

## 2018-04-30 PROCEDURE — 94640 AIRWAY INHALATION TREATMENT: CPT | Mod: 76

## 2018-04-30 PROCEDURE — 25000125 ZZHC RX 250: Performed by: ANESTHESIOLOGY

## 2018-04-30 PROCEDURE — 27210338 ZZH CIRCUIT HUMID FACE/TRACH MSK

## 2018-04-30 PROCEDURE — GZB2ZZZ ELECTROCONVULSIVE THERAPY, BILATERAL-SINGLE SEIZURE: ICD-10-PCS | Performed by: PSYCHIATRY & NEUROLOGY

## 2018-04-30 PROCEDURE — 25000128 H RX IP 250 OP 636: Performed by: PSYCHIATRY & NEUROLOGY

## 2018-04-30 PROCEDURE — 25000132 ZZH RX MED GY IP 250 OP 250 PS 637: Performed by: INTERNAL MEDICINE

## 2018-04-30 PROCEDURE — 25000125 ZZHC RX 250: Performed by: PHYSICIAN ASSISTANT

## 2018-04-30 PROCEDURE — 94640 AIRWAY INHALATION TREATMENT: CPT

## 2018-04-30 PROCEDURE — 36415 COLL VENOUS BLD VENIPUNCTURE: CPT | Performed by: PHYSICIAN ASSISTANT

## 2018-04-30 PROCEDURE — 25000132 ZZH RX MED GY IP 250 OP 250 PS 637: Performed by: PSYCHIATRY & NEUROLOGY

## 2018-04-30 PROCEDURE — 90870 ELECTROCONVULSIVE THERAPY: CPT

## 2018-04-30 PROCEDURE — 99233 SBSQ HOSP IP/OBS HIGH 50: CPT | Performed by: PHYSICIAN ASSISTANT

## 2018-04-30 PROCEDURE — 25000128 H RX IP 250 OP 636: Performed by: ANESTHESIOLOGY

## 2018-04-30 PROCEDURE — 12400006 ZZH R&B MH INTERMEDIATE

## 2018-04-30 PROCEDURE — 71045 X-RAY EXAM CHEST 1 VIEW: CPT

## 2018-04-30 PROCEDURE — 40000275 ZZH STATISTIC RCP TIME EA 10 MIN

## 2018-04-30 PROCEDURE — 80048 BASIC METABOLIC PNL TOTAL CA: CPT | Performed by: PHYSICIAN ASSISTANT

## 2018-04-30 PROCEDURE — 00000146 ZZHCL STATISTIC GLUCOSE BY METER IP

## 2018-04-30 PROCEDURE — 85027 COMPLETE CBC AUTOMATED: CPT | Performed by: PHYSICIAN ASSISTANT

## 2018-04-30 RX ORDER — IPRATROPIUM BROMIDE AND ALBUTEROL SULFATE 2.5; .5 MG/3ML; MG/3ML
3 SOLUTION RESPIRATORY (INHALATION) ONCE
Status: COMPLETED | OUTPATIENT
Start: 2018-04-30 | End: 2018-04-30

## 2018-04-30 RX ORDER — KETOROLAC TROMETHAMINE 30 MG/ML
30 INJECTION, SOLUTION INTRAMUSCULAR; INTRAVENOUS EVERY 6 HOURS PRN
Status: DISCONTINUED | OUTPATIENT
Start: 2018-04-30 | End: 2018-05-01 | Stop reason: HOSPADM

## 2018-04-30 RX ORDER — NALOXONE HYDROCHLORIDE 0.4 MG/ML
.1-.4 INJECTION, SOLUTION INTRAMUSCULAR; INTRAVENOUS; SUBCUTANEOUS
Status: CANCELLED | OUTPATIENT
Start: 2018-04-30 | End: 2018-05-01

## 2018-04-30 RX ORDER — ALBUTEROL SULFATE 0.83 MG/ML
2.5 SOLUTION RESPIRATORY (INHALATION) EVERY 4 HOURS PRN
Status: DISCONTINUED | OUTPATIENT
Start: 2018-04-30 | End: 2018-04-30 | Stop reason: HOSPADM

## 2018-04-30 RX ORDER — KETOROLAC TROMETHAMINE 30 MG/ML
30 INJECTION, SOLUTION INTRAMUSCULAR; INTRAVENOUS EVERY 6 HOURS PRN
Start: 2018-04-30

## 2018-04-30 RX ORDER — IPRATROPIUM BROMIDE AND ALBUTEROL SULFATE 2.5; .5 MG/3ML; MG/3ML
3 SOLUTION RESPIRATORY (INHALATION)
Status: DISPENSED | OUTPATIENT
Start: 2018-04-30 | End: 2018-05-01

## 2018-04-30 RX ORDER — SODIUM CHLORIDE, SODIUM LACTATE, POTASSIUM CHLORIDE, CALCIUM CHLORIDE 600; 310; 30; 20 MG/100ML; MG/100ML; MG/100ML; MG/100ML
INJECTION, SOLUTION INTRAVENOUS CONTINUOUS
Status: DISCONTINUED | OUTPATIENT
Start: 2018-04-30 | End: 2018-04-30 | Stop reason: HOSPADM

## 2018-04-30 RX ORDER — SODIUM CHLORIDE, SODIUM LACTATE, POTASSIUM CHLORIDE, CALCIUM CHLORIDE 600; 310; 30; 20 MG/100ML; MG/100ML; MG/100ML; MG/100ML
500 INJECTION, SOLUTION INTRAVENOUS CONTINUOUS
Status: DISCONTINUED | OUTPATIENT
Start: 2018-04-30 | End: 2018-04-30

## 2018-04-30 RX ADMIN — NICOTINE POLACRILEX 8 MG: 4 LOZENGE ORAL at 13:56

## 2018-04-30 RX ADMIN — IPRATROPIUM BROMIDE AND ALBUTEROL SULFATE 3 ML: .5; 3 SOLUTION RESPIRATORY (INHALATION) at 06:51

## 2018-04-30 RX ADMIN — LEVOTHYROXINE SODIUM 75 MCG: 75 TABLET ORAL at 10:04

## 2018-04-30 RX ADMIN — VORTIOXETINE 20 MG: 20 TABLET, FILM COATED ORAL at 10:03

## 2018-04-30 RX ADMIN — ESZOPICLONE 3 MG: 3 TABLET, FILM COATED ORAL at 21:15

## 2018-04-30 RX ADMIN — LIRAGLUTIDE 1.2 MG: 6 INJECTION SUBCUTANEOUS at 17:18

## 2018-04-30 RX ADMIN — LIDOCAINE HYDROCHLORIDE 1 ML: 10 INJECTION, SOLUTION EPIDURAL; INFILTRATION; INTRACAUDAL; PERINEURAL at 06:14

## 2018-04-30 RX ADMIN — IRBESARTAN 150 MG: 150 TABLET ORAL at 10:04

## 2018-04-30 RX ADMIN — HYDROXYZINE HYDROCHLORIDE 50 MG: 25 TABLET ORAL at 16:14

## 2018-04-30 RX ADMIN — MIRTAZAPINE 45 MG: 45 TABLET, FILM COATED ORAL at 21:15

## 2018-04-30 RX ADMIN — NICOTINE POLACRILEX 4 MG: 4 LOZENGE ORAL at 10:11

## 2018-04-30 RX ADMIN — SODIUM CHLORIDE, POTASSIUM CHLORIDE, SODIUM LACTATE AND CALCIUM CHLORIDE 500 ML: 600; 310; 30; 20 INJECTION, SOLUTION INTRAVENOUS at 06:14

## 2018-04-30 RX ADMIN — PHENOBARBITAL 32.4 MG: 32.4 TABLET ORAL at 19:58

## 2018-04-30 RX ADMIN — IPRATROPIUM BROMIDE AND ALBUTEROL SULFATE 3 ML: .5; 3 SOLUTION RESPIRATORY (INHALATION) at 18:05

## 2018-04-30 RX ADMIN — QUETIAPINE FUMARATE 400 MG: 200 TABLET ORAL at 21:15

## 2018-04-30 RX ADMIN — IBUPROFEN 800 MG: 400 TABLET ORAL at 20:01

## 2018-04-30 RX ADMIN — METFORMIN HYDROCHLORIDE 1000 MG: 500 TABLET ORAL at 10:03

## 2018-04-30 RX ADMIN — NICOTINE POLACRILEX 8 MG: 4 LOZENGE ORAL at 19:04

## 2018-04-30 RX ADMIN — QUETIAPINE FUMARATE 50 MG: 25 TABLET ORAL at 16:14

## 2018-04-30 RX ADMIN — IBUPROFEN 800 MG: 400 TABLET ORAL at 13:56

## 2018-04-30 RX ADMIN — IPRATROPIUM BROMIDE AND ALBUTEROL SULFATE 3 ML: .5; 3 SOLUTION RESPIRATORY (INHALATION) at 21:35

## 2018-04-30 RX ADMIN — PAROXETINE HYDROCHLORIDE 10 MG: 10 TABLET, FILM COATED ORAL at 10:04

## 2018-04-30 RX ADMIN — ATORVASTATIN CALCIUM 40 MG: 40 TABLET, FILM COATED ORAL at 10:03

## 2018-04-30 RX ADMIN — KETOROLAC TROMETHAMINE 30 MG: 30 INJECTION, SOLUTION INTRAMUSCULAR at 06:15

## 2018-04-30 RX ADMIN — NICOTINE POLACRILEX 8 MG: 4 LOZENGE ORAL at 11:33

## 2018-04-30 RX ADMIN — INSULIN ASPART 1 UNITS: 100 INJECTION, SOLUTION INTRAVENOUS; SUBCUTANEOUS at 17:18

## 2018-04-30 RX ADMIN — METFORMIN HYDROCHLORIDE 1000 MG: 500 TABLET ORAL at 17:21

## 2018-04-30 RX ADMIN — NICOTINE POLACRILEX 8 MG: 4 LOZENGE ORAL at 16:14

## 2018-04-30 RX ADMIN — TRAZODONE HYDROCHLORIDE 50 MG: 50 TABLET ORAL at 21:15

## 2018-04-30 RX ADMIN — INSULIN ASPART 1 UNITS: 100 INJECTION, SOLUTION INTRAVENOUS; SUBCUTANEOUS at 12:26

## 2018-04-30 RX ADMIN — IPRATROPIUM BROMIDE AND ALBUTEROL SULFATE 3 ML: .5; 3 SOLUTION RESPIRATORY (INHALATION) at 12:16

## 2018-04-30 RX ADMIN — NICOTINE POLACRILEX 8 MG: 4 LOZENGE ORAL at 21:15

## 2018-04-30 ASSESSMENT — PAIN DESCRIPTION - DESCRIPTORS: DESCRIPTORS: HEADACHE

## 2018-04-30 NOTE — PLAN OF CARE
"Problem: Depressive Symptoms  Goal: Depressive Symptoms  Signs and symptoms of listed problems will be absent or manageable.   1. Mood stability   2. Absence of suicidal ideation/contracts for safety   3. Depressive s/sx resolved  4. Safety plan in place  5. Positive coping skills identified/utilized  6. Medication regiment established/compliance  7. Adequate sleep  8. Housing community support  9. F/u plan in place     Outcome: Improving  Pt sat with nurse to write out a plan for the first week or so after discharging from the hospital; including no driving for a week after ECT. Pt states his son lives at home with him and is available during the daytime. Pt appears to smile more often compared to 5 days ago, but expressed apprehension regarding readiness to go home. Pt expressed regret over his divorce, states it was his fault. Pt stated the past 4 months had been very depressing for him, and spoke about having written out a check to his son and then ingested too many pills. Pt was able to state he is looking forward to sitting on his deck in the sunshine and being with his dogs. 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 Pt denies suicidal thoughts this evening or thoughts of self harm. Participated in Wrap-up group; med compliant; social with peers; eats well. Plan is ECT # 6 in the am.      "

## 2018-04-30 NOTE — PROGRESS NOTES
[facility] Psychiatric Progress Note       Interim History     The patient's care was discussed with the treatment team and chart notes were reviewed. Pt seen on SDU. Tolerating medications without side effects. Side effects, risks, and benefits of medications reviewed with patient. Pt had his final ECT this morning, experienced hypoxia with wheezing post-ECT. He has improved with nebulizer and CPAP, now on O2. He will continue with oximetry monitoring and nebulizer on the unit, hospitalist does not see an emergent need to move pt to a medical floor. He will remain on the unit for stabilization.     Hospital Course     On April 18, 2018, pt was directly admitted to [facility] for worsening depression and misuse of benzodiazepines, being prescribed several prescriptions from two different providers. He was started on a series of ECT and placed on a phenobarbital taper at 32.4mg tid for benzodiazepine withdrawal. On April 19, 2018, pt reported he was starting to feel better with ECT and his medications. He will have his second ECT tomorrow and continue with phenobarbital. On April 20, 2018, pt had his second ECT without complications. He will continue on phenobarbital and ECT series. On April 23, 2018, pt had his third ECT without complications. Phenobarbital was decreased to 32.4mg bid bid for two days. On April 24, 2018, pt tolerated the decrease in phenobarbital, he will have his fourth ECT tomorrow. It was recommended that he attend IOP at [facility] on discharge. Paxil will be decreased to 10mg qam. On April 25, 2018, pt had his fourth ECT without complications. Phenobarbital will decrease to 32.4mg qhs for two nights. He will remain on the unit for stabilization. On April 26, 2018, pt complained of anxiety in the afternoon. He was encouraged to practice a Freeze Frame Exercise to address his anxiety and to work on AA materials on the unit. On April 27, 2018, pt had his fifth ECT and will  remain on the unit until his final ECT on Monday. On April 30, 2018, pt had complications after his final ECT and was placed on oxygen. He has since stabilized and will remain on the unit.     Medications     Current Facility-Administered Medications Ordered in Epic   Medication Dose Route Frequency Last Rate Last Dose     albuterol neb solution 2.5 mg  2.5 mg Nebulization Q4H PRN         [Auto Hold] atorvastatin (LIPITOR) tablet 40 mg  40 mg Oral Daily   40 mg at 04/29/18 0924     [Auto Hold] glucose gel 15-30 g  15-30 g Oral Q15 Min PRN        Or     [Auto Hold] dextrose 50 % injection 25-50 mL  25-50 mL Intravenous Q15 Min PRN        Or     [Auto Hold] glucagon injection 1 mg  1 mg Subcutaneous Q15 Min PRN         [Auto Hold] eszopiclone (LUNESTA) tablet 3 mg  3 mg Oral At Bedtime   3 mg at 04/29/18 2102     [Auto Hold] hydrOXYzine (ATARAX) tablet 25-50 mg  25-50 mg Oral Q4H PRN   50 mg at 04/29/18 1347     [Auto Hold] ibuprofen (ADVIL/MOTRIN) tablet 800 mg  800 mg Oral TID PRN   800 mg at 04/29/18 1347     [Auto Hold] insulin aspart (NovoLOG) inj (RAPID ACTING)  1-7 Units Subcutaneous TID AC   1 Units at 04/29/18 1726     [Auto Hold] insulin aspart (NovoLOG) inj (RAPID ACTING)  1-5 Units Subcutaneous At Bedtime   2 Units at 04/18/18 2105     [Auto Hold] irbesartan (AVAPRO) tablet 150 mg  150 mg Oral Daily   150 mg at 04/29/18 0924     [Auto Hold] ketorolac (TORADOL) injection 30 mg  30 mg Intravenous Q6H PRN   30 mg at 04/30/18 0615     ketorolac (TORADOL) injection 30 mg  30 mg Intravenous Q6H PRN         lactated ringers infusion  500 mL Intravenous Continuous 25 mL/hr at 04/30/18 0614 500 mL at 04/30/18 0614     lactated ringers infusion   Intravenous Continuous         [Auto Hold] levothyroxine (SYNTHROID/LEVOTHROID) tablet 75 mcg  75 mcg Oral Daily   75 mcg at 04/29/18 0925     lidocaine 1 % 1 mL  1 mL Other Q1H PRN   1 mL at 04/30/18 0614     [Auto Hold] liraglutide (VICTOZA) injection 1.2 mg  1.2 mg  "Subcutaneous Daily   1.2 mg at 04/29/18 1724     [Auto Hold] metFORMIN (GLUCOPHAGE) tablet 1,000 mg  1,000 mg Oral BID w/meals   1,000 mg at 04/29/18 1728     [Auto Hold] mirtazapine (REMERON) tablet 45 mg  45 mg Oral At Bedtime   45 mg at 04/29/18 2102     [Auto Hold] nicotine polacrilex (COMMIT) lozenge 4-8 mg  4-8 mg Buccal Q1H PRN   8 mg at 04/29/18 2009     [Auto Hold] ondansetron (ZOFRAN-ODT) ODT tab 4 mg  4 mg Oral Q8H PRN         [Auto Hold] PARoxetine (PAXIL) tablet 10 mg  10 mg Oral Daily   10 mg at 04/29/18 0924     [Auto Hold] PHENobarbital (LUMINAL) tablet 32.4 mg  32.4 mg Oral At Bedtime   32.4 mg at 04/29/18 2002     [Auto Hold] QUEtiapine (SEROquel) tablet 200-400 mg  200-400 mg Oral At Bedtime   400 mg at 04/29/18 2102     [Auto Hold] QUEtiapine (SEROquel) tablet 25-50 mg  25-50 mg Oral Q2H PRN   50 mg at 04/29/18 1600     [Auto Hold] traZODone (DESYREL) tablet 50 mg  50 mg Oral At Bedtime   50 mg at 04/29/18 2324     [Auto Hold] vortioxetine (TRINTELLIX/BRINTELLIX) tablet 20 mg  20 mg Oral Daily   20 mg at 04/29/18 0924     Current Outpatient Prescriptions Ordered in Epic   Medication     liraglutide (VICTOZA PEN) 18 MG/3ML soln         Allergies      No Known Allergies     Medical Review of Systems     /72  Pulse 77  Temp 97.9  F (36.6  C) (Oral)  Resp 15  Ht 1.778 m (5' 10\")  Wt 101.4 kg (223 lb 8 oz)  SpO2 93%  BMI 32.07 kg/m2  Body mass index is 32.07 kg/(m^2).  A 10-point review of systems was performed by Irving Pappas MD and is negative, no new findings.      Psychiatric Examination     Appearance Laying in bed, dressed in scrubs. Appears stated age. Nasal cannula in place.   Attitude Cooperative, pleasant, tired   Orientation Oriented to person, place, time   Eye Contact Good   Speech Regular rate, rhythm, volume and tone   Language Normal   Psychomotor Behavior Appropriate   Mood Much less depressed, slightly less anxious   Affect Broader range   Thought Process " Goal-Oriented, Intact   Associations Intact   Thought Content Patient is currently negative for suicidal ideation, negative for plan or intent, able to contract no self harm and identify barriers to suicide.  Negative for obsessions, compulsions or psychosis.      Fund of Knowledge Average   Insight Improving   Judgement Improving   Attention Span & Concentration Intact   Recent & Remote Memory Fair   Gait Normal   Muscle Tone Intact        Labs     Labs reviewed.  Recent Results (from the past 24 hour(s))   Glucose by meter    Collection Time: 04/29/18 11:54 AM   Result Value Ref Range    Glucose 139 (H) 70 - 99 mg/dL   Glucose by meter    Collection Time: 04/29/18  4:43 PM   Result Value Ref Range    Glucose 149 (H) 70 - 99 mg/dL   Glucose by meter    Collection Time: 04/29/18  8:55 PM   Result Value Ref Range    Glucose 152 (H) 70 - 99 mg/dL        Impression     This is a 63 year old male with a history of depression and anxiety who was directly admitted to [facility] for worsening depression and Ativan abuse. Discussed pt beginning ECT for treatment-resistant depression. Explained to pt the benefits, side-effects and complications of the procedure. Pt gave verbal consent to series of 6 bilateral ECT treatments starting 4/18/18. ECT treatment plan created, HUC notified to schedule procedure.        Diagnoses     1. Major depression, recurrent, severe, without psychotic features.  2. Panic disorder without agoraphobia.   3. Sedative dependence.  4. Opiate use disorder, in remission.     Plan     1. Explained side effects, benefits, and complications of medications to the patient, Pt gave verbal consent.  2. Medication changes: Continue phenobarb taper. Add Seroquel 25-50mg q2h prn for anxiety.  3. Discussed treatment plan with patient and team.  4. Projected length of stay: Until ECT series has completed.  5. ECT complete.  6. IOP at [facility] on discharge.      Attestation:   Patient has been seen and  evaluated by me, Irving Pappas MD.    Patient ID:  Name: Tanner Villatoro  MRN: [id number]  Admission: 4/17/2018   YOB: 1955

## 2018-04-30 NOTE — OR NURSING
Pt having decreased O2 sats post ECT. Duoneb given and IS being used. Exp wheezes bilat. CXR obtained.

## 2018-04-30 NOTE — PLAN OF CARE
Problem: Patient Care Overview  Goal: Team Discussion  Team Plan:   Outcome: No Change  BEHAVIORAL TEAM DISCUSSION    Participants:Dr. Pappas, Case Management, Nursing Staff, PA's  Progress: No change  Continued Stay Criteria/Rationale: Stabilization  Medical/Physical: ECT  Precautions:   Behavioral Orders   Procedures     Code 1 - Restrict to Unit     Electroconvulsive therapy     Series of up to 12 treatments. Begin Date: 4/18/18     Treating Psychiatrist providing ECT:  Dr Ochoa     Notified on:  4/18/18     Electroconvulsive therapy     Series of up to 12 treatments. Begin Date: 4/18/18     Treating Psychiatrist providing ECT:  Dr Ochoa     Notified on:  4/18/18     Routine Programming     As clinically indicated     Status 15     Every 15 minutes.     Withdrawal precautions     Plan: No D/C or meeting anymore. Last ECT was today  Rationale for change in precautions or plan: Stabilize

## 2018-04-30 NOTE — OR NURSING
Dr Hill spoke to Dr Pappas for hospitalist consult. Pt changed to cpap mask and wheezes diminished and pt states breathing feels better

## 2018-04-30 NOTE — PLAN OF CARE
Problem: Depressive Symptoms  Goal: Depressive Symptoms  Signs and symptoms of listed problems will be absent or manageable.   1. Mood stability   2. Absence of suicidal ideation/contracts for safety   3. Depressive s/sx resolved  4. Safety plan in place  5. Positive coping skills identified/utilized  6. Medication regiment established/compliance  7. Adequate sleep  8. Housing community support  9. F/u plan in place     Outcome: Improving  Patient denies suicidal ideation.  Had ECT # 6 today.  Patient was disappointed he could not go home.  He came back from ECT with a cpap and oxygen.  CPAP was removed by respiratory therapy and oxygen was placed at 3 liters nasal cannula. 02 sats at 91-93%  He was instructed on the incentive spirometer and used it frequently during the day.  He had a dry cough and wheezes in the R upper lobe upon return to the unit.  He spent most of the day in his room in bed but did come out for meals and did come out a few times for nicotine lozenges.  Encouraged patient to sit up and walk and use incentive spirometer.  Decreased 02 to 2 liters at 1300 and 02 sats went to 91-92%.  Decreased 02 to 1 liter at 1300 and 02 sats remained 91-92% at 1330.  Stopped 02 but 20 minutes later was 86-88% on room air so nasal cannula was placed at 1 liter and sats went back up to 91-93%.  Bethany GOMEZ informed staff to keep sats at 89% or higher.

## 2018-04-30 NOTE — PROGRESS NOTES
[facility]    Hospitalist Progress Note      Assessment & Plan      Post-op Hypoxia: Patient s/p his 6th and final ECT today. Developed wheezing and hypoxia post-op. Improved with Neb and CPAP. Now on 2-3L O2. Denies complaints of SOB. No history of hypoxia with previous ECT. Denies h/o of COPD or MIMI but has risk factors. CXR clear. Afebrile. No cough. DDX includes aspiration pneumonitis, mucous plugging, undx COPD or MIMI  -- BMP and CBC pending  -- Continue NC and continuous oximetry monitoring. Wean as tolerated  -- Nebs q 4 x 4 today  -- If fever develops low threshold to start antibiotics    Addendum: WBC return 18, could be stress response. Nursing working on weaning O2. Afebrile. Monitor. If develops fever or worsening symptoms low threshold to transfer and start antibiotics. Will follow up tomorrow.      Depression  Anxiety  History of Substance Abuse: The patient is currently treated by Dr. Pappas and was directly admitted from his clinic for worsening depression and anxiety. He has a history of suicide attempts, but denies a plan or SI now. He has a history of opioid addiction, but has been weaned off using lorazepam by his PCP and has not had opiates in 6-7 weeks. History of ETOH abuse but has not had a drink since 1988. 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   -- Management per Psych     Hypertension:   -- Continue PTA Irbesartan     Diabetes Type II:   -- Continue PTA Metformin 1000 mg bid and Victoza 1.2 mg daily     Hypothyroidism: Chronic and stable on levothyroxine 75mcg daily. TSH today 3.31.  -- continue levothyroxine     Recent Dental Infection: Treated x 7 days with augmentin  -- Follow up with Dentist on d/c    # Pain Assessment:  Current Pain Score 4/30/2018   Patient currently in pain? no   Pain score (0-10) -   Pain location -   Pain descriptors -   Tanner s pain level was assessed and he currently denies pain.           DVT Prophylaxis: Ambulate every shift  Code Status: Full Code    Disposition: Discussed plan of care with patient and nurse. Will remain on MH unit for now. If issues with hypoxia while sleeping can consider t/f to medical floor. Suspect hypoxia will be transient so will try to keep in MH. Hospitalist will follow.     Bethany Hall PA-C  Hospitalist Service  [phone]      Bethany Hall    Interval History   Anesthesia notes reviewed. Discussed case with bedside RN. Patient went for 6th and final ECT treatment this am. Developed post procedure hypoxia and briefly treated with CPAP. Now back on  on 2-3L NC.    Patient asymptomatic. Denies SOB, chest tightness. No recent cough or URI symptoms. Afebrile. No formal dx of COPD or MIMI. No issues with hypoxia during previous 5 treatments    -Data reviewed today: I reviewed all new labs and imaging results over the last 24 hours. I personally reviewed the chest x-ray image(s) showing no infiltrate .    Physical Exam   Temp: 97.9  F (36.6  C) Temp src: Oral BP: 142/79 Pulse: 77 Heart Rate: 79 Resp: 16 SpO2: 94 % O2 Device: Nasal cannula Oxygen Delivery: 4 LPM  Vitals:    04/17/18 2147 04/24/18 0810   Weight: 103.3 kg (227 lb 12.8 oz) 101.4 kg (223 lb 8 oz)     Vital Signs with Ranges  Temp:  [97.9  F (36.6  C)-98.9  F (37.2  C)] 97.9  F (36.6  C)  Pulse:  [77] 77  Heart Rate:  [76-92] 79  Resp:  [12-23] 16  BP: ()/(63-88) 142/79  FiO2 (%):  [35 %-50 %] 35 %  SpO2:  [88 %-98 %] 94 %       Constitutional: Alert, resting comfortably in NAD  Respiratory: Normal effort, symmetric expansion, faint expiratory wheezing throughout  Cardiovascular: RRR no murmurs   GI: obese,   normal bowels sounds, no tenderness or guarding  MSK: LE without edema. Dorsalis pedis pulse palpated bilaterally.   Skin/Integumen: Clear  Neuro: CN II-XII grossly intact  Psych:  Alert and oriented x 3. Normal affect      Medications     lactated ringers 500 mL (04/30/18 0614)        atorvastatin (LIPITOR) tablet 40 mg  40 mg Oral Daily     eszopiclone (LUNESTA) tablet 3 mg  3 mg Oral At Bedtime     insulin aspart  1-7 Units Subcutaneous TID AC     insulin aspart  1-5 Units Subcutaneous At Bedtime     ipratropium - albuterol 0.5 mg/2.5 mg/3 mL  3 mL Nebulization 4x daily     irbesartan (AVAPRO) tablet 150 mg  150 mg Oral Daily     levothyroxine (SYNTHROID/LEVOTHROID) tablet 75 mcg  75 mcg Oral Daily     liraglutide  1.2 mg Subcutaneous Daily     metFORMIN (GLUCOPHAGE) tablet 1,000 mg  1,000 mg Oral BID w/meals     mirtazapine (REMERON) tablet 45 mg  45 mg Oral At Bedtime     PARoxetine (PAXIL) tablet 10 mg  10 mg Oral Daily     PHENobarbital  32.4 mg Oral At Bedtime     QUEtiapine (SEROquel) tablet 200-400 mg  200-400 mg Oral At Bedtime     traZODone (DESYREL) tablet 50 mg  50 mg Oral At Bedtime     vortioxetine  20 mg Oral Daily       Data   No lab results found in last 7 days.    Recent Results (from the past 24 hour(s))   XR Chest Port 1 View    Narrative    CHEST ONE VIEW UPRIGHT 4/30/2018 7:39 AM     HISTORY: Post electroconvulsive therapy, decreased O2 sats.    COMPARISON: None.      Impression    IMPRESSION: No acute disease.    LYNETTE WU MD

## 2018-04-30 NOTE — PROGRESS NOTES
Respiratory care note. Pt is 98% sat on 2lpm nc. Awake and talking without sob. Pulled cpap. Pt is well without machine. No SOB. Does not use a cpap machine at home. Nurses want to wean 02 today to room air.[id number]

## 2018-04-30 NOTE — OR NURSING
Pt placed on NC -- sats 93-94%. Transferred to  per w/c and O2. RT informed to place pt on CPAP on  unit.

## 2018-05-01 VITALS
HEIGHT: 70 IN | SYSTOLIC BLOOD PRESSURE: 134 MMHG | TEMPERATURE: 98.4 F | DIASTOLIC BLOOD PRESSURE: 82 MMHG | BODY MASS INDEX: 31.94 KG/M2 | WEIGHT: 223.1 LBS | RESPIRATION RATE: 16 BRPM | HEART RATE: 73 BPM | OXYGEN SATURATION: 91 %

## 2018-05-01 LAB
ERYTHROCYTE [DISTWIDTH] IN BLOOD BY AUTOMATED COUNT: 12.8 % (ref 10–15)
GLUCOSE BLDC GLUCOMTR-MCNC: 105 MG/DL (ref 70–99)
GLUCOSE BLDC GLUCOMTR-MCNC: 135 MG/DL (ref 70–99)
HCT VFR BLD AUTO: 40.5 % (ref 40–53)
HGB BLD-MCNC: 14.3 G/DL (ref 13.3–17.7)
MCH RBC QN AUTO: 32 PG (ref 26.5–33)
MCHC RBC AUTO-ENTMCNC: 35.3 G/DL (ref 31.5–36.5)
MCV RBC AUTO: 91 FL (ref 78–100)
PLATELET # BLD AUTO: 202 10E9/L (ref 150–450)
RBC # BLD AUTO: 4.47 10E12/L (ref 4.4–5.9)
WBC # BLD AUTO: 12 10E9/L (ref 4–11)

## 2018-05-01 PROCEDURE — 00000146 ZZHCL STATISTIC GLUCOSE BY METER IP

## 2018-05-01 PROCEDURE — 85027 COMPLETE CBC AUTOMATED: CPT | Performed by: PHYSICIAN ASSISTANT

## 2018-05-01 PROCEDURE — 25000132 ZZH RX MED GY IP 250 OP 250 PS 637: Performed by: PHYSICIAN ASSISTANT

## 2018-05-01 PROCEDURE — 36415 COLL VENOUS BLD VENIPUNCTURE: CPT | Performed by: PHYSICIAN ASSISTANT

## 2018-05-01 PROCEDURE — 25000132 ZZH RX MED GY IP 250 OP 250 PS 637: Performed by: INTERNAL MEDICINE

## 2018-05-01 PROCEDURE — 25000132 ZZH RX MED GY IP 250 OP 250 PS 637: Performed by: PSYCHIATRY & NEUROLOGY

## 2018-05-01 PROCEDURE — 90853 GROUP PSYCHOTHERAPY: CPT

## 2018-05-01 RX ORDER — HYDROXYZINE HYDROCHLORIDE 50 MG/1
50 TABLET, FILM COATED ORAL 2 TIMES DAILY PRN
Qty: 60 TABLET | Refills: 0 | Status: SHIPPED | OUTPATIENT
Start: 2018-05-01 | End: 2019-03-27

## 2018-05-01 RX ORDER — PAROXETINE 10 MG/1
10 TABLET, FILM COATED ORAL DAILY
Start: 2018-05-02 | End: 2019-03-27

## 2018-05-01 RX ORDER — QUETIAPINE FUMARATE 50 MG/1
50 TABLET, FILM COATED ORAL 2 TIMES DAILY PRN
Qty: 60 TABLET | Refills: 0 | Status: SHIPPED | OUTPATIENT
Start: 2018-05-01 | End: 2019-03-27

## 2018-05-01 RX ADMIN — NICOTINE POLACRILEX 8 MG: 4 LOZENGE ORAL at 11:41

## 2018-05-01 RX ADMIN — NICOTINE POLACRILEX 4 MG: 4 LOZENGE ORAL at 13:34

## 2018-05-01 RX ADMIN — PAROXETINE HYDROCHLORIDE 10 MG: 10 TABLET, FILM COATED ORAL at 08:38

## 2018-05-01 RX ADMIN — IRBESARTAN 150 MG: 150 TABLET ORAL at 08:38

## 2018-05-01 RX ADMIN — ATORVASTATIN CALCIUM 40 MG: 40 TABLET, FILM COATED ORAL at 08:38

## 2018-05-01 RX ADMIN — VORTIOXETINE 20 MG: 20 TABLET, FILM COATED ORAL at 08:38

## 2018-05-01 RX ADMIN — NICOTINE POLACRILEX 4 MG: 4 LOZENGE ORAL at 10:22

## 2018-05-01 RX ADMIN — QUETIAPINE FUMARATE 50 MG: 25 TABLET ORAL at 13:33

## 2018-05-01 RX ADMIN — NICOTINE POLACRILEX 8 MG: 4 LOZENGE ORAL at 15:05

## 2018-05-01 RX ADMIN — NICOTINE POLACRILEX 4 MG: 4 LOZENGE ORAL at 08:41

## 2018-05-01 RX ADMIN — METFORMIN HYDROCHLORIDE 1000 MG: 500 TABLET ORAL at 08:38

## 2018-05-01 RX ADMIN — HYDROXYZINE HYDROCHLORIDE 50 MG: 25 TABLET ORAL at 13:34

## 2018-05-01 RX ADMIN — LEVOTHYROXINE SODIUM 75 MCG: 75 TABLET ORAL at 08:38

## 2018-05-01 RX ADMIN — IBUPROFEN 800 MG: 400 TABLET ORAL at 13:33

## 2018-05-01 ASSESSMENT — ACTIVITIES OF DAILY LIVING (ADL)
ORAL_HYGIENE: INDEPENDENT
GROOMING: INDEPENDENT
LAUNDRY: WITH SUPERVISION
DRESS: INDEPENDENT

## 2018-05-01 NOTE — PROGRESS NOTES
Discharge instructions reviewed with patient including follow-up care plan on previous shift. Denied SI. All belongings which where brought into the hospital have been returned to patient. Picked up by son. Pt D/C'd at 1530.

## 2018-05-01 NOTE — PROGRESS NOTES
Chart Check:  Patient off O2. Afebrile. WBC trending down from 18--12 without treatment.     Would recommend patient have sleep study and PFTs as outpatient to assess possible MIMI and/or COPD.      Bethany Hall PA-C  Hospitalist Service  [phone]

## 2018-05-01 NOTE — PLAN OF CARE
Problem: General Plan of Care (Inpatient Behavioral)  Goal: Discharge Planning  Patient attended Process Group on 5/1/18. Participation complete and appropriate. Insight shown.

## 2018-05-01 NOTE — PROGRESS NOTES
Patient discharged denies suicidal ideation and has stable mood. Instructions gone over with patient regarding medications and follow up plan.  . Copies of discharge instructions ( After Visit Summary) given to patient. Medications sent home with patient. Son will pick pt ups around 3pm. NO DRIVING FOR 1 WEEK.

## 2018-05-01 NOTE — DISCHARGE INSTRUCTIONS
Call to schedule follow up appointment with Dr. Guthrie- phone # [phone]-Any referral should go through your primary care doctor within 1 month of hospital discharge in order to discuss diabetes management. Inform that you required supplemental insulin while hospitalized despite continuing metformin and Victoza. Metformin has been increased to bid. Please inform your Primary of this and keep track of your blood sugars and relay this to Dr. Guthrie    Also recommended is that you schedule a sleep study and Pulmonary FunctionTest as outpatient as outpatient following bout with oxygenation difficulties. Referral would need to go through Dr. Guthrie (see above). Number for [facility] is .    Behavioral Discharge Planning and Instructions    Summary: Admitted to hospital with worsening anxiety and depression and inability to function in everyday life    Main Diagnosis: Major depression; Panic disorder without agoraphobia; Sedative dependence; opiate use disorder, in remission     Major Treatments, Procedures and Findings: psychiatric assessment; Phenobarb taper from benzodiazepines; Series of electro-convulsive therapy treatments.   Final treatment to be completed as outpatient on 4/30/18. Do not drive for at least one week following your last ECT treatment. If you have lingering memory issues or impaired judgment, do not drive until you have been cleared to do so by your physician.     Symptoms to Report: mood getting worse or thoughts of suicide    Lifestyle Adjustment: Sober lifestyle recommended. Follow up with AA and get a sponsor. Intensive outpatient Program recommended to learn anxiety and depression management skills.Complete and follow safety plan.    Psychiatry Follow-up:     Dr. Pappas - appointment on Friday 5/11/18 @1:45 pm.       [facility]  [address], [address]  [place]  [address]  Phone:  [phone]  Fax:  [phone]    [facility]  program- intake on Monday 5/7/18 with Gita Shirley. Arrive @ 8:45 for paperwork. Interview @ 9:15  [facility]  [address], [address]  [place]  [address]  Phone:  [phone]  Fax:  [phone]    Resources:     [facility]-   Crisis Intervention: 821.708.6241 or 173-847-9611 (TTY: [phone]).  Call anytime for help.  [place] [employer] [place]ness (www.[place].foreign.org): 132.261.5492 or 135-527-9025.  Alcoholics Anonymous (www.alcoholics-anonymous.org): Check your phone book for your local chapter.  National Suicide Prevention Line (www.mentalhealthmn.org): 325-742-WDNP (0478)    General Medication Instructions:   See your medication sheet(s) for instructions.   Take all medicines as directed.  Make no changes unless your doctor suggests them.   Go to all your doctor visits.  Be sure to have all your required lab tests. This way, your medicines can be refilled on time.  Do not use any drugs not prescribed by your doctor.  Avoid alcohol.

## 2018-05-01 NOTE — PLAN OF CARE
"Met with Radhames to discuss discharge and assess O2 sats. When walking his O2 remained >90%. He stated he was a little nervous about discharging and having to deal with things he has been putting off for months. When asked about coping strategies he said he had none he was just using the ECT. Pt and nursing student discussed practices that could be easily worked into his day and identified mindfulness/deep breathing as something pt felt he could do. Pt will begin to do a \"mindful minute\" every morning and at the end identify 1 thing he is grateful for, 1 thing he is hopeful for and 1 goal he can accomplish. Deep breathing will be used throughout the day to help him focus on the present. Pt will fill out personal safety plan. Pt denies any SI.   "

## 2018-05-01 NOTE — PLAN OF CARE
"Problem: Depressive Symptoms  Goal: Depressive Symptoms  Signs and symptoms of listed problems will be absent or manageable.   1. Mood stability   2. Absence of suicidal ideation/contracts for safety   3. Depressive s/sx resolved  4. Safety plan in place  5. Positive coping skills identified/utilized  6. Medication regiment established/compliance  7. Adequate sleep  8. Housing community support  9. F/u plan in place     Outcome: Improving  Patient was visible in the unit, sat in the lounge and in franz most of the shift. Attended group activity, mood calm but flat affect. Pt denies SI when asked, replied \"I feel better\". Pt had no need of oxygen all shift with O2 sats at 91-95% at RA. Denies SOB. Pt C/O headaches, Ibuprofen given with relief. Scheduled Nebs given with relief and now completed. Will continue to monitor.    "

## 2018-05-01 NOTE — PROGRESS NOTES
[facility] Psychiatric Progress Note       Interim History     The patient's care was discussed with the treatment team and chart notes were reviewed. Pt seen on SDU. Tolerating medications without side effects. Side effects, risks, and benefits of medications reviewed with patient. He is more medically stable today. Denies suicidal or homicidal ideation. He reports that although he has improved with ECT, he continues to feel depressed. Denies suicidal or homicidal ideation. He is looking forward to discharging home today and hopeful his mood will improve. He will attend IOP at [facility], intake is set up for next Monday. Pt to follow-up with Dr. Pappas at [facility] within 4 weeks.  30 day supply of medication provided at time of discharge.      Hospital Course     On April 18, 2018, pt was directly admitted to [facility] for worsening depression and misuse of benzodiazepines, being prescribed several prescriptions from two different providers. He was started on a series of ECT and placed on a phenobarbital taper at 32.4mg tid for benzodiazepine withdrawal. On April 19, 2018, pt reported he was starting to feel better with ECT and his medications. He will have his second ECT tomorrow and continue with phenobarbital. On April 20, 2018, pt had his second ECT without complications. He will continue on phenobarbital and ECT series. On April 23, 2018, pt had his third ECT without complications. Phenobarbital was decreased to 32.4mg bid bid for two days. On April 24, 2018, pt tolerated the decrease in phenobarbital, he will have his fourth ECT tomorrow. It was recommended that he attend IOP at [facility] on discharge. Paxil will be decreased to 10mg qam. On April 25, 2018, pt had his fourth ECT without complications. Phenobarbital will decrease to 32.4mg qhs for two nights. He will remain on the unit for stabilization. On April 26, 2018, pt complained of anxiety in the  afternoon. He was encouraged to practice a Freeze Frame Exercise to address his anxiety and to work on AA materials on the unit. On April 27, 2018, pt had his fifth ECT and will remain on the unit until his final ECT on Monday. On April 30, 2018, pt had complications after his final ECT and was placed on oxygen. He has since stabilized and will remain on the unit. On May 1, 2018, pt was more stable medically, nebulizer was discontinued and O2 sats were stable on room air. He will discharge today. Denies suicidal or homicidal ideation. 30 day supply of medication provided at time of discharge. Pt to follow-up with Dr. Pappas at [facility] within 4 weeks.  IOP at [facility].     Medications     Current Facility-Administered Medications Ordered in Epic   Medication Dose Route Frequency Last Rate Last Dose     atorvastatin (LIPITOR) tablet 40 mg  40 mg Oral Daily   40 mg at 05/01/18 0838     glucose gel 15-30 g  15-30 g Oral Q15 Min PRN        Or     dextrose 50 % injection 25-50 mL  25-50 mL Intravenous Q15 Min PRN        Or     glucagon injection 1 mg  1 mg Subcutaneous Q15 Min PRN         eszopiclone (LUNESTA) tablet 3 mg  3 mg Oral At Bedtime   3 mg at 04/30/18 2115     hydrOXYzine (ATARAX) tablet 25-50 mg  25-50 mg Oral Q4H PRN   50 mg at 04/30/18 1614     ibuprofen (ADVIL/MOTRIN) tablet 800 mg  800 mg Oral TID PRN   800 mg at 04/30/18 2001     insulin aspart (NovoLOG) inj (RAPID ACTING)  1-7 Units Subcutaneous TID AC   1 Units at 04/30/18 1718     insulin aspart (NovoLOG) inj (RAPID ACTING)  1-5 Units Subcutaneous At Bedtime   2 Units at 04/18/18 2105     ipratropium - albuterol 0.5 mg/2.5 mg/3 mL (DUONEB) neb solution 3 mL  3 mL Nebulization 4x daily   3 mL at 04/30/18 2135     irbesartan (AVAPRO) tablet 150 mg  150 mg Oral Daily   150 mg at 05/01/18 0838     ketorolac (TORADOL) injection 30 mg  30 mg Intravenous Q6H PRN         levothyroxine (SYNTHROID/LEVOTHROID) tablet 75 mcg  75  "mcg Oral Daily   75 mcg at 05/01/18 0838     liraglutide (VICTOZA) injection 1.2 mg  1.2 mg Subcutaneous Daily   1.2 mg at 04/30/18 1718     metFORMIN (GLUCOPHAGE) tablet 1,000 mg  1,000 mg Oral BID w/meals   1,000 mg at 05/01/18 0838     mirtazapine (REMERON) tablet 45 mg  45 mg Oral At Bedtime   45 mg at 04/30/18 2115     nicotine polacrilex (COMMIT) lozenge 4-8 mg  4-8 mg Buccal Q1H PRN   4 mg at 05/01/18 1022     ondansetron (ZOFRAN-ODT) ODT tab 4 mg  4 mg Oral Q8H PRN         PARoxetine (PAXIL) tablet 10 mg  10 mg Oral Daily   10 mg at 05/01/18 0838     PHENobarbital (LUMINAL) tablet 32.4 mg  32.4 mg Oral At Bedtime   32.4 mg at 04/30/18 1958     QUEtiapine (SEROquel) tablet 200-400 mg  200-400 mg Oral At Bedtime   400 mg at 04/30/18 2115     QUEtiapine (SEROquel) tablet 25-50 mg  25-50 mg Oral Q2H PRN   50 mg at 04/30/18 1614     traZODone (DESYREL) tablet 50 mg  50 mg Oral At Bedtime   50 mg at 04/30/18 2115     vortioxetine (TRINTELLIX/BRINTELLIX) tablet 20 mg  20 mg Oral Daily   20 mg at 05/01/18 0838     Current Outpatient Prescriptions Ordered in Epic   Medication     liraglutide (VICTOZA PEN) 18 MG/3ML soln         Allergies      No Known Allergies     Medical Review of Systems     /82  Pulse 73  Temp 98.4  F (36.9  C) (Oral)  Resp 16  Ht 1.778 m (5' 10\")  Wt 101.2 kg (223 lb 1.6 oz)  SpO2 92%  BMI 32.01 kg/m2  Body mass index is 32.01 kg/(m^2).  A 10-point review of systems was performed by Irving Pappas MD and is negative, no new findings.      Psychiatric Examination     Appearance Sitting in chair, dressed in scrubs. Appears stated age.    Attitude Cooperative, pleasant   Orientation Oriented to person, place, time   Eye Contact Good   Speech Regular rate, rhythm, volume and tone   Language Normal   Psychomotor Behavior Appropriate   Mood Much less depressed and anxious   Affect Broad range   Thought Process Goal-Oriented, Intact   Associations Intact   Thought Content Patient is " currently negative for suicidal ideation, negative for plan or intent, able to contract no self harm and identify barriers to suicide.  Negative for obsessions, compulsions or psychosis.      Fund of Knowledge Average   Insight Fair   Judgement Fair   Attention Span & Concentration Intact   Recent & Remote Memory Improving   Gait Normal   Muscle Tone Intact        Labs     Labs reviewed.  Recent Results (from the past 24 hour(s))   Glucose by meter    Collection Time: 04/30/18 12:22 PM   Result Value Ref Range    Glucose 152 (H) 70 - 99 mg/dL   Glucose by meter    Collection Time: 04/30/18  5:00 PM   Result Value Ref Range    Glucose 144 (H) 70 - 99 mg/dL   Glucose by meter    Collection Time: 04/30/18  8:54 PM   Result Value Ref Range    Glucose 127 (H) 70 - 99 mg/dL   CBC with platelets    Collection Time: 05/01/18  8:07 AM   Result Value Ref Range    WBC 12.0 (H) 4.0 - 11.0 10e9/L    RBC Count 4.47 4.4 - 5.9 10e12/L    Hemoglobin 14.3 13.3 - 17.7 g/dL    Hematocrit 40.5 40.0 - 53.0 %    MCV 91 78 - 100 fl    MCH 32.0 26.5 - 33.0 pg    MCHC 35.3 31.5 - 36.5 g/dL    RDW 12.8 10.0 - 15.0 %    Platelet Count 202 150 - 450 10e9/L   Glucose by meter    Collection Time: 05/01/18  8:16 AM   Result Value Ref Range    Glucose 105 (H) 70 - 99 mg/dL        Impression     This is a 63 year old male with a history of depression and anxiety who was directly admitted to [facility] for worsening depression and Ativan abuse. Pt completed his ECT series and is stable for discharge.        Diagnoses     1. Major depression, recurrent, severe, without psychotic features.  2. Panic disorder without agoraphobia.   3. Sedative dependence.  4. Opiate use disorder, in remission.     Plan     1. Explained side effects, benefits, and complications of medications to the patient, Pt gave verbal consent.  2. Medication changes: Discontinue phenobarbital.  3. Discussed treatment plan with patient and team.  4. Projected length of stay: Pt to discharge  today.  5. 30 day supply of medication provided at time of discharge.   6. IOP at [facility] on discharge.  7. Pt to follow-up with Dr. Pappas at [facility] within 4 weeks.        Attestation:   Patient has been seen and evaluated by me, Irving Pappas MD.    Patient ID:  Name: Tanner Villatoro  MRN: [id number]  Admission: 4/17/2018   YOB: 1955

## 2018-05-11 NOTE — DISCHARGE SUMMARY
[facility] Psychiatric Discharge Summary      DATE OF ADMISSION: 4/17/2018     DATE OF DISCHARGE: 5/1/2018    PRIMARY CARE PHYSICIAN: Shaun Guthrie    IDENTIFICATION: Patient is a 63 year old male  male currently living in [place]. Pt sees PCP Shaun Tilley. Pt is followed by Dr. Pappas at [facility]. Pt seen on SDU. For history, see dictation by Dr. Pappas on 4/18/18. For physical summary, see dictation by ONEYDA Ayala CNP on 4/18/18.     HOSPITAL COURSE:  Mr. Tanner Villatoro is a 63 year old male with a history of depression, anxiety, opiate, and sedative use who presents as a direct admission from [facility] on account of worsening anxiety and depression. He had presented to the clinic on an emergency evaluation as pt's insurance company had stated that they are denying prescriptions for Vivitrol as pt has been receiving Ativan prescriptions from other providers. He admitted to taking 50 tablets of Ativan in the past 14 days and having two separate providers for. Pt is an anxious person, a worrier. Pt endorses consistent, pervasive sense of anxiety and worry. Pt finds this anxiety difficult to control, often resulting in restlessness, feeling on edge, irritability, and sleep disturbance. Pt confirms having panic attacks, where pt states they feel like their heart is racing, chest pounding, sweating palms, and cannot breathe.  Pt has severely depressed mood, motivation poor, concentration poor, low energy, hopeless, helpless, and worthless with SI, no plan, able to contract for safety. He was started on a series of ECT and had his first treatment this morning without complications. He will have a second ECT on 4/20/18. He was also started on a phenobarbital taper at 32.4mg tid. He will remain on the unit for the duration of his ECT.    On April 18, 2018, pt was directly admitted to [facility] for worsening depression and misuse of benzodiazepines,  being prescribed several prescriptions from two different providers. He was started on a series of ECT and placed on a phenobarbital taper at 32.4mg tid for benzodiazepine withdrawal. On April 19, 2018, pt reported he was starting to feel better with ECT and his medications. He will have his second ECT tomorrow and continue with phenobarbital. On April 20, 2018, pt had his second ECT without complications. He will continue on phenobarbital and ECT series. On April 23, 2018, pt had his third ECT without complications. Phenobarbital was decreased to 32.4mg bid bid for two days. On April 24, 2018, pt tolerated the decrease in phenobarbital, he will have his fourth ECT tomorrow. It was recommended that he attend IOP at [facility] on discharge. Paxil will be decreased to 10mg qam. On April 25, 2018, pt had his fourth ECT without complications. Phenobarbital will decrease to 32.4mg qhs for two nights. He will remain on the unit for stabilization. On April 26, 2018, pt complained of anxiety in the afternoon. He was encouraged to practice a Freeze Frame Exercise to address his anxiety and to work on AA materials on the unit. On April 27, 2018, pt had his fifth ECT and will remain on the unit until his final ECT on Monday. On April 30, 2018, pt had complications after his final ECT and was placed on oxygen. He has since stabilized and will remain on the unit. On May 1, 2018, pt was more stable medically, nebulizer was discontinued and O2 sats were stable on room air. He will discharge today. Denies suicidal or homicidal ideation. 30 day supply of medication provided at time of discharge. Pt to follow-up with Dr. Pappas at [facility] within 4 weeks.  IOP at [facility].    DISCHARGE MENTAL STATUS EXAMINATION:    Appearance Sitting in chair, dressed in scrubs. Appears stated age.    Attitude Cooperative, pleasant   Orientation Oriented to person, place, time   Eye Contact Good   Speech  "Regular rate, rhythm, volume and tone   Language Normal   Psychomotor Behavior Appropriate   Mood Much less depressed and anxious   Affect Broad range   Thought Process Goal-Oriented, Intact   Associations Intact   Thought Content Patient is currently negative for suicidal ideation, negative for plan or intent, able to contract no self harm and identify barriers to suicide.  Negative for obsessions, compulsions or psychosis.      Fund of Knowledge Average   Insight Fair   Judgement Fair   Attention Span & Concentration Intact   Recent & Remote Memory Improving   Gait Normal   Muscle Tone Intact         LABORATORY DATA:    Refer to hospitalist admission dictation.  No results found for this or any previous visit (from the past 24 hour(s)).     /82  Pulse 73  Temp 98.4  F (36.9  C) (Oral)  Resp 16  Ht 1.778 m (5' 10\")  Wt 101.2 kg (223 lb 1.6 oz)  SpO2 91%  BMI 32.01 kg/m2     DISCHARGE MEDICATIONS:      Review of your medicines      START taking       Dose / Directions    hydrOXYzine 50 MG tablet   Commonly known as:  ATARAX   Used for:  Severe recurrent major depression without psychotic features (H)        Dose:  50 mg   Take 1 tablet (50 mg) by mouth 2 times daily as needed for anxiety   Quantity:  60 tablet   Refills:  0         CONTINUE these medicines which may have CHANGED, or have new prescriptions. If we are uncertain of the size of tablets/capsules you have at home, strength may be listed as something that might have changed.       Dose / Directions    metFORMIN 1000 MG tablet   Commonly known as:  GLUCOPHAGE   Indication:  Type 2 Diabetes   This may have changed:    - medication strength  - when to take this   Used for:  Type 2 diabetes mellitus with complication, without long-term current use of insulin (H)        Dose:  1000 mg   Take 1 tablet (1,000 mg) by mouth 2 times daily (with meals)   Quantity:  60 tablet   Refills:  0       PARoxetine 10 MG tablet   Commonly known as:  PAXIL   This " may have changed:    - medication strength  - how much to take   Used for:  Severe recurrent major depression without psychotic features (H)        Dose:  10 mg   Take 1 tablet (10 mg) by mouth daily   Refills:  0       * SEROQUEL PO   This may have changed:  Another medication with the same name was added. Make sure you understand how and when to take each.        Dose:  200-400 mg   Take 200-400 mg by mouth At Bedtime   Refills:  0       * QUEtiapine 50 MG tablet   Commonly known as:  SEROquel   This may have changed:  You were already taking a medication with the same name, and this prescription was added. Make sure you understand how and when to take each.   Used for:  Severe recurrent major depression without psychotic features (H)        Dose:  50 mg   Take 1 tablet (50 mg) by mouth 2 times daily as needed (anxiety)   Quantity:  60 tablet   Refills:  0       VICTOZA PEN 18 MG/3ML soln   Indication:  Type 2 Diabetes   This may have changed:  how much to take   Generic drug:  liraglutide        Dose:  1.2 mg   Inject 1.2 mg Subcutaneous daily   Refills:  0       * Notice:  This list has 2 medication(s) that are the same as other medications prescribed for you. Read the directions carefully, and ask your doctor or other care provider to review them with you.      CONTINUE these medicines which have NOT CHANGED       Dose / Directions    ASPIRIN PO        Dose:  81 mg   Take 81 mg by mouth daily   Refills:  0       AVAPRO PO        Dose:  150 mg   Take 150 mg by mouth daily   Refills:  0       IBUPROFEN PO        Dose:  800 mg   Take 800 mg by mouth 3 times daily as needed for moderate pain   Refills:  0       LEVOTHYROXINE SODIUM PO        Dose:  75 mcg   Take 75 mcg by mouth daily.   Refills:  0       LIPITOR PO        Dose:  40 mg   Take 40 mg by mouth daily.   Refills:  0       LUNESTA PO        Dose:  3 mg   Take 3 mg by mouth At Bedtime   Refills:  0       REMERON PO        Dose:  45 mg   Take 45 mg by mouth  At Bedtime   Refills:  0       TRAZODONE HCL PO        Dose:  50 mg   Take 50 mg by mouth At Bedtime   Refills:  0       TRINTELLIX 20 MG tablet   Generic drug:  vortioxetine        Dose:  20 mg   Take 20 mg by mouth daily   Refills:  0       ZOFRAN ODT PO        Dose:  4 mg   Take 4 mg by mouth every 8 hours as needed for nausea or vomiting   Refills:  0            Where to get your medicines      These medications were sent to [facility] [place], [place] - [address]  [address], [place] [address]     Phone:  [phone]      hydrOXYzine 50 MG tablet     metFORMIN 1000 MG tablet     QUEtiapine 50 MG tablet         Some of these will need a paper prescription and others can be bought over the counter. Ask your nurse if you have questions.     You don't need a prescription for these medications      PARoxetine 10 MG tablet             DISCHARGE DIAGNOSES:    1. Major depression, recurrent, severe, without psychotic features.  2. Panic disorder without agoraphobia.   3. Sedative dependence.  4. Opiate use disorder, in remission.    DISCHARGE PLAN:     1. Explained side effects, benefits, and complications of medications to the patient, Pt gave verbal consent.  2. Medication changes: Discontinue phenobarbital.  3. Discussed treatment plan with patient and team.  4. Projected length of stay: Pt to discharge today.  5. 30 day supply of medication provided at time of discharge.   6. IOP at [facility] [place] on discharge.  7. Pt to follow-up with Dr. Pappas at [facility] within 4 weeks.      DISCHARGE FOLLOW-UP:    Psychiatry Follow-up:      Dr. Pappas - appointment on Friday 5/11/18 @1:45 pm.       [facility]  [address], [address]  [place]  [address]  Phone:  [phone]  Fax:  [phone]     [facility] program- intake on Monday 5/7/18 with Gita Shirley. Arrive @ 8:45 for paperwork. Interview @ 9:15  [facility]  Services  [address] [place] [address], [address]  [place]  [address]  Phone:  [phone]  Fax:  [phone]       Attestation:   Patient has been seen and evaluated by me, Irving Pappas MD.    Patient ID:    Name: Tanner Villatoro MRN: [id number]  Admission: 4/17/2018  YOB: 1955

## 2018-06-28 ENCOUNTER — PATIENT OUTREACH (OUTPATIENT)
Dept: CARE COORDINATION | Facility: CLINIC | Age: 63
End: 2018-06-28

## 2018-06-28 DIAGNOSIS — F33.9 MAJOR DEPRESSION, RECURRENT (H): Primary | ICD-10-CM

## 2018-06-28 NOTE — PROGRESS NOTES
"Clinic Care Coordination Contact    Clinic Care Coordination Contact  OUTREACH    Referral Information:  Referral Source: PCP  Met with Dr. Spencer yesterday and he needs referral to psychiatrist. He has been working with Dr. Robb and would like to try someone new.  His PCP is Dr. Guthrie, but is comfortable with seeing Dr. Spencer if Dr. Guthrie  Is unavailable.     Primary Diagnosis: Behavioral Health    Chief Complaint   Patient presents with     Clinic Care Coordination - Initial        Universal Utilization: Appropriate utilization.   Clinic Utilization  Difficulty keeping appointments:: No  Utilization    Last refreshed: 6/16/2018  1:06 AM:  No Show Count (past year) 0       Last refreshed: 6/16/2018  1:06 AM:  ED visits 0       Last refreshed: 6/16/2018  1:06 AM:  Hospital admissions 1          Current as of: 6/16/2018  1:06 AM           Clinical Concerns:  Current Medical Concerns:  No concerns noted. Had a heart attack in 2010 and that is when depression started. He hurt his back and started taking oxycodone and that made him \"off kilter.\"  He is not taking any oxy since March 2018.    Current Behavioral Concerns: Has been depressed severely, since December 2017. 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  He entered hospital and had ECT which did not work and made his mind fuzzy and he is more forgetful.  He did do IOP for two weeks after hospitalization at [facility]. He quit when he found himself more depressed by attending the program.  He stays in bed until noon and is unable to complete tasks.  He has been in a bad place for too long.  He will try to go to AA today, but likely he will not go.  He wants to be better.   He felt that while Dr. Guthrie was treating his depressive symptoms earlier this year, his symptoms were better controlled.  When he started working with a psychiatrist, he felt things went worse. He doesn't think he should be on two antidepressants.    Education " Provided to patient: Discussed care coordination and options for psychiatry.    Pain  Chronic pain (GOAL):: No  Health Maintenance Reviewed: Up to date  Clinical Pathway: None    Medication Management:  Is independent.  Wants to work with a new psychiatrist to review his current medications and perhaps adjust the medications.      Functional Status:  Dependent ADLs:: Independent  Bed or wheelchair confined:: No  Mobility Status: Independent  Fallen 2 or more times in the past year?: No  Any fall with injury in the past year?: No    Living Situation:  Current living arrangement:: I live in a private home with family (19 yr son)  He is .  His son works.   Type of residence:: Private home - stairs    Diet/Exercise/Sleep:  Diet:: Regular  Inadequate nutrition (GOAL):: No  Food Insecurity: No  Tube Feeding: No  Exercise:: Currently not exercising  Inadequate activity/exercise (GOAL):: No  Significant changes in sleep pattern (GOAL): No    Transportation:  Transportation concerns (GOAL):: No  Transportation means:: Regular car     Psychosocial:  Mental health DX:: Yes  Mental health DX how managed:: Medication  Mental health management concern (GOAL):: Yes  Informal Support system:: Family     Financial/Insurance:   Financial/Insurance concerns (GOAL):: Yes (no work or income since December 2017)  [employer].       Resources and Interventions:  Current Resources:    None     Equipment Currently Used at Home: none    Advance Care Plan/Directive  Advanced Care Plans/Directives on file:: No    Referrals Placed: Behavioral Health Providers ([facility])     Patient/Caregiver understanding: Patient would like to switch from Dr. Castro to a new psychiatrist.  CC called Dr. Sanderson's office as Dr. Spencer recommended him.  Dr. Sanderson is unable to treat this patient but recommended Dr. Santos in [place]. CC called him and he is not accepting new patients.  Call to patient and he does not have any other  preferred options for psychiatry or medication management.  He is not interested in therapy or anything besides medication management.  CC offered to set up appointment and to call him back. Called [facility] and they are booked out 1 month, but recommend [facility] as they have more availability.  Patient wants to have an afternoon appointment as he does not get up until late morning.  Called [facility] and set up next available, July 26th in afternoon with PATRICIA Peguero at [facility], [id number] [place] [address] [phone] arrive at 12:20 to complete paper work, or complete paper work at home and arrive at 1:15.  He can download new patient materials off of website.  They want his records faxed to the clinic at [phone]. They scheduled a f/u appointment for 20 minutes on 8-23 at 2:20.  He could have done a tele psychiatry appointment sooner, but he chose the in person option.   Called patient and left this information on his voice mail and asked for a call back.     Outreach Frequency: 2 weeks      Plan: CC will call patient in 5 days if no call back  Mamta Dalton,   [facility]  [email or web address]  [phone]

## 2018-07-03 NOTE — PROGRESS NOTES
Clinic Care Coordination Contact  [facility]/Voicemail    Referral Source: PCP  Clinical Data: Care Coordinator Outreach    Call to patient to make sure he got message with details about the psychiatry appointments that were set up for him by CC.   Outreach attempted x 1  Left message on voicemail with call back information and requested return call.  Plan: Care Coordinator will try to reach patient again in 5-10 business days.  Mamta Dalton,   [facility]  [email or web address]  [phone]

## 2018-07-25 ENCOUNTER — PATIENT OUTREACH (OUTPATIENT)
Dept: CARE COORDINATION | Facility: CLINIC | Age: 63
End: 2018-07-25

## 2018-08-02 ENCOUNTER — PATIENT OUTREACH (OUTPATIENT)
Dept: CARE COORDINATION | Facility: CLINIC | Age: 63
End: 2018-08-02

## 2018-08-02 NOTE — PROGRESS NOTES
Clinic Care Coordination Contact  [facility]/Voicemail       Clinical Data: Care Coordinator Outreach  Outreach attempted x 1.  Left message on voicemail with call back information and requested return call.  Plan:Care Coordinator will try to reach patient again in 3-5 business days.  Social Sapphire Yang  [facility]  [email or web address]  [phone]    Clinic Care Coordination Contact  Care Team Conversations  Call back from patient.  He is doing poorly with controlling his depression.  He is working with Dr. Joseph at [facility] who is in the process of going off Paxil and will be switching to another medication.  Dr. Joseph referred him to outpatient group sessions and patient missed the intake.  He will reschedule after getting insurance authorization. Patient does not see the benefit to doing group sessions while he feels so depressed and would rather wait until his new medications have helped his symptoms.  His next appointment with Dr. Joseph is on 8-15.    He asked CCC to have [facility] send last urine lab to [facility] in [facility].  Dr. Joseph needs to ensure patient is not taking any other medications.  If patient needs to come into clinic to do labs again, he will, but he thinks they have results form previous OV that could be used.     He also would like to know when Dr. Guthrie wants to see him again.  He expected to get a phone call with that information, but has not heard from clinic about that yet.    Plan- call patient in 2 weeks to assess for CCC needs.    Social Sapphire Yang  [facility]  [email or web address]  [phone]

## 2018-08-08 NOTE — PROGRESS NOTES
Clinic Care Coordination Contact  Care Team Conversations  Call from patient asking if urine sample results were sent to [facility].  CCC sent note to triage asking if they were faxed. PCP has been out of the office this week and does not return until next week. Patient will call to set up appointment for early next week.      He is feeling better today and could go out and  prescriptions. He thinks that he is responding best to the lorazepam which was the medication he was on 8 years ago.     Plan- assist as needed with getting urine sample results faxed to [facility].  Call in 3 weeks to assess for needs.    Mamta Dalton,   [facility]  [email or web address]  [phone]

## 2018-08-09 ASSESSMENT — ACTIVITIES OF DAILY LIVING (ADL): DEPENDENT_IADLS:: INDEPENDENT

## 2018-08-09 NOTE — PROGRESS NOTES
Clinic Care Coordination Contact  Care Team Conversations  Call from patient who is having a good day.  He wanted to see if clinic faxed recent UA to [facility].      He also wants to set up appointment with Dr. Guthrie for early next week.  Referred him to call [facility] to schedule.    Talked to Amelia, triage nurse and she faxed the UA to [facility] this morning.      Called patient and LM with this information.    Plan- CCC to call in three weeks.   Mamta Dalton,   [facility]  [email or web address]  [phone]

## 2018-09-07 ENCOUNTER — PATIENT OUTREACH (OUTPATIENT)
Dept: CARE COORDINATION | Facility: CLINIC | Age: 63
End: 2018-09-07

## 2018-09-07 NOTE — PROGRESS NOTES
Clinic Care Coordination Contact  [facility]/Voicemail       Clinical Data: Care Coordinator Outreach  Outreach attempted x 1.  Left message on voicemail with call back information and requested return call.    Call back from patient and he left voice mail. He wants to continue to work with CC. He is meeting with a new psychiatrist on Wednesday at [facility].  Doing better. Trying to get his meds straightened out. If he sleeps well, he does well.  If he doesn't sleep well, he has a crummy day.     Plan: Care Coordinator will try to reach patient again in 10 business days.  Mamta Dalton,   [facility]  [email or web address]  [phone]

## 2018-09-28 ENCOUNTER — PATIENT OUTREACH (OUTPATIENT)
Dept: CARE COORDINATION | Facility: CLINIC | Age: 63
End: 2018-09-28

## 2018-09-28 ASSESSMENT — ACTIVITIES OF DAILY LIVING (ADL): DEPENDENT_IADLS:: INDEPENDENT

## 2018-09-28 NOTE — PROGRESS NOTES
Clinic Care Coordination Contact  [facility]/Voicemail    Referral Source: PCP  Clinical Data: Care Coordinator Outreach  Outreach attempted x 1.  Left message on voicemail with call back information and requested return call.  Plan:  Care Coordinator will try to reach patient again in 3-5 business days.  Mamta Dalton,   [facility]  [email or web address]  [phone]

## 2018-10-12 ENCOUNTER — PATIENT OUTREACH (OUTPATIENT)
Dept: CARE COORDINATION | Facility: CLINIC | Age: 63
End: 2018-10-12

## 2018-10-12 NOTE — PROGRESS NOTES
Clinic Care Coordination Contact    Clinic Care Coordination Contact  OUTREACH    Referral Information:    Primary Diagnosis: Behavioral Health    Chief Complaint   Patient presents with     Clinic Care Coordination - Follow-up     Clinical Concerns:  Current Medical Concerns:  Has appointment with PCP to do preventative health maintenance.     Current Behavioral Concerns: Is feeling hopeful about working with Dr. Franz who has diagnosed him as having Bipolor versus depression/anxiety.   Continues to use medication Ativan prescribed by Dr. Guthrie to control his anxiety.      Goals:   Goals        General    Mental Health Management (pt-stated)     Notes - Note created  10/12/2018  3:54 PM by Mamta Dalton LSW    Goal Statement: I will have my mental health symptoms under control.  Measure of Success: I will have more good days than bad days.   Supportive Steps to Achieve: I will continue to work with Dr. Franz and Dr. Guthrie  Barriers: Been experiencing depression and anxiety for over 8 years.   Strengths: Wants to get better.   Date to Achieve By: January 1, 2018  Patient expressed understanding of goal: yes              Patient/Caregiver understanding: Is happy with Dr. Franz handle his mental health needs.  Dr. Franz asked him if he would be ok with not working again. He is unsure if he wants to never work again.  Today he has shaved and is up which is a good day.  He worked with Dr. Guthrie's nurse on getting his medications straightened out.      Outreach Frequency: monthly.   Plan: CC to call monthly to assess for needs.   Mamta Dalton,   [facility]  [email or web address]  [phone]

## 2018-11-19 ENCOUNTER — PATIENT OUTREACH (OUTPATIENT)
Dept: CARE COORDINATION | Facility: CLINIC | Age: 63
End: 2018-11-19

## 2018-11-19 NOTE — PROGRESS NOTES
Clinic Care Coordination Contact  [facility]/Voicemail       Clinical Data: Care Coordinator Outreach  Outreach attempted x 1.  Left message on voicemail with call back information and requested return call.  Plan:  Care Coordinator will try to reach patient again in 5-10 business days.  Mamta Dalton   [facility]  [email or web address]  [phone]    Clinic Care Coordination Contact  [facility]/Voicemail       Clinical Data: Care Coordinator Outreach  Outreach attempted x 2.  Left message on voicemail with call back information and requested return call.  Plan:  Care Coordinator will try to reach patient again in 5-10 business days.  Mamta Dalton   [facility]  [email or web address]  [phone]      Clinic Care Coordination Contact    Clinic Care Coordination Contact  OUTREACH     Goals:   Goals        General    Mental Health Management (pt-stated)     Notes - Note created  10/12/2018  3:54 PM by Mamta Dalton LSW    Goal Statement: I will have my mental health symptoms under control.  Measure of Success: I will have more good days than bad days.   Supportive Steps to Achieve: I will continue to work with Dr. Franz and Dr. Guthrie  Barriers: Been experiencing depression and anxiety for over 8 years.   Strengths: Wants to get better.   Date to Achieve By: January 1, 2018  Patient expressed understanding of goal: yes                Patient/Caregiver understanding: Call back from patient.  He went to work for the first time in many months on 12-4.  He is going back again this week. He didn't make any calls, but this was a good step. He didn't sleep well last night even though he took the Ambien.  Sometimes it does not work and he may want to use Lunesta again..  He is fighting through his depression and is taking Abilify and thinks it may be helping though he thinks his antidepressant needs to be changed.  He has a new psychiatrist appointment on 1-10.  He did like Dr. Wolfe, but  he is almost retired and referred him back to the [facility].  Dr. Guthrie adjusted his thyroid medication and he will be getting labs done to see if he is at right levels.  He continues to have some panic attacks, but was able to get his hair cut and go shopping at [email or web address] and [employer].  He knows these are improvements and wants to continue to get better.      Outreach Frequency:1 month.     Plan: CC will call in one month to assess needs.    Mamta Dalton,   [facility]  [email or web address]  [phone]

## 2019-01-24 ENCOUNTER — PATIENT OUTREACH (OUTPATIENT)
Dept: CARE COORDINATION | Facility: CLINIC | Age: 64
End: 2019-01-24

## 2019-01-24 ASSESSMENT — ACTIVITIES OF DAILY LIVING (ADL): DEPENDENT_IADLS:: INDEPENDENT

## 2019-01-24 NOTE — PROGRESS NOTES
Clinic Care Coordination Contact  [facility]/Voicemail    Referral Source: PCP  Clinical Data: Care Coordinator Outreach  Outreach attempted x 2.  Left message on voicemail with call back information and requested return call.  Plan:  Care Coordinator will do no further outreaches at this time.  Social Sapphire Yang  [facility]  [email or web address]  [phone]          Clinic Care Coordination Contact  [facility]/Voicemail    Referral Source: PCP  Clinical Data: Care Coordinator Outreach  Outreach attempted x 1.  Left message on voicemail with call back information and requested return call.  Plan:  Care Coordinator will try to reach patient again in 3-5 business days.  Social Sapphire Yang  [facility]  [email or web address]  [phone]

## 2019-02-08 ASSESSMENT — ACTIVITIES OF DAILY LIVING (ADL): DEPENDENT_IADLS:: INDEPENDENT

## 2019-02-13 NOTE — PROGRESS NOTES
"Pt was a direct admit from Dr. Pappas's office for worsening depression and anxiety.  Pt is supposed to begin ECT tx tomorrow morning and will need to be medically cleared.  Hx substance abuse, DM II,  chronic back pain, Hypothyroid, HTN, MI on January 15 2010 and reports having 2 stents in his heart.  Upon arrival to the unit pt was tearful, anxious, and scared.  Very pleasant man.  Reports going through withdrawals.  Pt was addicted to Oxycontin and Vicodin for 2 years, which he began for a back injury.  He reports stopping the opiates 6-7 weeks ago.  He was prescribed Ativan by his PCP while weaning off opiates.  He was on Ativan for 6 years starting in 2010.  Pt is an alcoholic and has not had a drink since December 15, 1988.  He reported that his A1C was 10.4 last Thursday during a pre-op visit for ECT but has not received the results.  Apparently his WBC was elevated.  He goes to [facility].  Pt attempted suicide approx 8 weeks ago via OD on beta blockers. Currently denies SI. Endorses passive death wish stated, \"I'm tired of living like this, I just want it to end.\"  Current stressors are the depression, anxiety, and addiction.  He is also struggling with not being able to work since December.  He is self-employed as a realtor and had a panic attack today while attempting to go back to work.    Nursing assessment complete including patient and medication profiles. Risk assessments completed addressing suicide,fall,skin,nutrition and safety issues. Care plan initiated. Assessments reviewed with physician and admit orders received. Welcome packet reviewed with patient. Information reviewed includes getting emergency help, preventing infections, understanding your care, using medication safely, reducing falls, preventing pressure ulcers, smoking cessation, powerful choices and Patients Bill of Rights. Pt. given tour of the unit and instruction on use of facility including emergency call " light. Program schedule reviewed with patient. Questions regarding the unit addressed. Pt. Search completed and belongings inventoried.         Respiratory

## 2019-03-27 ENCOUNTER — HOSPITAL ENCOUNTER (INPATIENT)
Facility: CLINIC | Age: 64
LOS: 9 days | Discharge: PSYCHIATRIC OR SUBSTANCE USE FACILITY | DRG: 897 | End: 2019-04-05
Attending: FAMILY MEDICINE | Admitting: PSYCHIATRY & NEUROLOGY
Payer: COMMERCIAL

## 2019-03-27 DIAGNOSIS — F13.10 BENZODIAZEPINE ABUSE (H): ICD-10-CM

## 2019-03-27 DIAGNOSIS — E78.5 DYSLIPIDEMIA: ICD-10-CM

## 2019-03-27 DIAGNOSIS — G47.00 PERSISTENT DISORDER OF INITIATING OR MAINTAINING SLEEP: ICD-10-CM

## 2019-03-27 DIAGNOSIS — F17.200 NICOTINE DEPENDENCE, UNCOMPLICATED, UNSPECIFIED NICOTINE PRODUCT TYPE: ICD-10-CM

## 2019-03-27 DIAGNOSIS — T46.5X2A INTENTIONAL DIAZOXIDE OVERDOSE, INITIAL ENCOUNTER (H): ICD-10-CM

## 2019-03-27 DIAGNOSIS — T38.1X2A: ICD-10-CM

## 2019-03-27 DIAGNOSIS — I10 BENIGN ESSENTIAL HYPERTENSION: ICD-10-CM

## 2019-03-27 DIAGNOSIS — F15.20: ICD-10-CM

## 2019-03-27 DIAGNOSIS — G47.00 INSOMNIA, UNSPECIFIED TYPE: Primary | ICD-10-CM

## 2019-03-27 DIAGNOSIS — F32.A ANXIETY AND DEPRESSION: ICD-10-CM

## 2019-03-27 DIAGNOSIS — T50.902A INTENTIONAL DRUG OVERDOSE, INITIAL ENCOUNTER (H): ICD-10-CM

## 2019-03-27 DIAGNOSIS — F41.9 ANXIETY AND DEPRESSION: ICD-10-CM

## 2019-03-27 LAB
ALBUMIN SERPL-MCNC: 4.2 G/DL (ref 3.4–5)
ALP SERPL-CCNC: 71 U/L (ref 40–150)
ALT SERPL W P-5'-P-CCNC: 46 U/L (ref 0–70)
AMPHETAMINES UR QL SCN: NEGATIVE
ANION GAP SERPL CALCULATED.3IONS-SCNC: 13 MMOL/L (ref 3–14)
APAP SERPL-MCNC: <2 MG/L (ref 10–20)
APTT PPP: 31 SEC (ref 22–37)
AST SERPL W P-5'-P-CCNC: 33 U/L (ref 0–45)
BARBITURATES UR QL: NEGATIVE
BASOPHILS # BLD AUTO: 0 10E9/L (ref 0–0.2)
BASOPHILS NFR BLD AUTO: 0.2 %
BENZODIAZ UR QL: NEGATIVE
BILIRUB SERPL-MCNC: 0.9 MG/DL (ref 0.2–1.3)
BUN SERPL-MCNC: 14 MG/DL (ref 7–30)
CALCIUM SERPL-MCNC: 10.2 MG/DL (ref 8.5–10.1)
CANNABINOIDS UR QL SCN: NEGATIVE
CHLORIDE SERPL-SCNC: 99 MMOL/L (ref 94–109)
CO2 SERPL-SCNC: 23 MMOL/L (ref 20–32)
COCAINE UR QL: NEGATIVE
CREAT SERPL-MCNC: 0.86 MG/DL (ref 0.66–1.25)
DIFFERENTIAL METHOD BLD: NORMAL
EOSINOPHIL # BLD AUTO: 0 10E9/L (ref 0–0.7)
EOSINOPHIL NFR BLD AUTO: 0.4 %
ERYTHROCYTE [DISTWIDTH] IN BLOOD BY AUTOMATED COUNT: 12.6 % (ref 10–15)
ETHANOL SERPL-MCNC: <0.01 G/DL
ETHANOL UR QL SCN: NEGATIVE
GFR SERPL CREATININE-BSD FRML MDRD: >90 ML/MIN/{1.73_M2}
GLUCOSE BLDC GLUCOMTR-MCNC: 158 MG/DL (ref 70–99)
GLUCOSE SERPL-MCNC: 147 MG/DL (ref 70–99)
HCT VFR BLD AUTO: 44.3 % (ref 40–53)
HGB BLD-MCNC: 15.9 G/DL (ref 13.3–17.7)
IMM GRANULOCYTES # BLD: 0 10E9/L (ref 0–0.4)
IMM GRANULOCYTES NFR BLD: 0.2 %
INR PPP: 1.04 (ref 0.86–1.14)
LYMPHOCYTES # BLD AUTO: 1.2 10E9/L (ref 0.8–5.3)
LYMPHOCYTES NFR BLD AUTO: 12.5 %
MCH RBC QN AUTO: 31.8 PG (ref 26.5–33)
MCHC RBC AUTO-ENTMCNC: 35.9 G/DL (ref 31.5–36.5)
MCV RBC AUTO: 89 FL (ref 78–100)
MONOCYTES # BLD AUTO: 1 10E9/L (ref 0–1.3)
MONOCYTES NFR BLD AUTO: 11.2 %
NEUTROPHILS # BLD AUTO: 7 10E9/L (ref 1.6–8.3)
NEUTROPHILS NFR BLD AUTO: 75.5 %
NRBC # BLD AUTO: 0 10*3/UL
NRBC BLD AUTO-RTO: 0 /100
OPIATES UR QL SCN: NEGATIVE
PLATELET # BLD AUTO: 245 10E9/L (ref 150–450)
POTASSIUM SERPL-SCNC: 3.5 MMOL/L (ref 3.4–5.3)
PROT SERPL-MCNC: 7.7 G/DL (ref 6.8–8.8)
RBC # BLD AUTO: 5 10E12/L (ref 4.4–5.9)
SALICYLATES SERPL-MCNC: <2 MG/DL
SODIUM SERPL-SCNC: 135 MMOL/L (ref 133–144)
T4 FREE SERPL-MCNC: >8 NG/DL (ref 0.76–1.46)
TROPONIN I SERPL-MCNC: <0.015 UG/L (ref 0–0.04)
TSH SERPL DL<=0.005 MIU/L-ACNC: 0.11 MU/L (ref 0.4–4)
WBC # BLD AUTO: 9.2 10E9/L (ref 4–11)

## 2019-03-27 PROCEDURE — 25000132 ZZH RX MED GY IP 250 OP 250 PS 637: Performed by: PSYCHIATRY & NEUROLOGY

## 2019-03-27 PROCEDURE — 80329 ANALGESICS NON-OPIOID 1 OR 2: CPT | Performed by: FAMILY MEDICINE

## 2019-03-27 PROCEDURE — 80307 DRUG TEST PRSMV CHEM ANLYZR: CPT | Performed by: FAMILY MEDICINE

## 2019-03-27 PROCEDURE — HZ2ZZZZ DETOXIFICATION SERVICES FOR SUBSTANCE ABUSE TREATMENT: ICD-10-PCS | Performed by: PSYCHIATRY & NEUROLOGY

## 2019-03-27 PROCEDURE — 84484 ASSAY OF TROPONIN QUANT: CPT | Performed by: FAMILY MEDICINE

## 2019-03-27 PROCEDURE — 00000146 ZZHCL STATISTIC GLUCOSE BY METER IP

## 2019-03-27 PROCEDURE — 99285 EMERGENCY DEPT VISIT HI MDM: CPT | Mod: 25 | Performed by: FAMILY MEDICINE

## 2019-03-27 PROCEDURE — 12800000 ZZH R&B CD/MH ADULT

## 2019-03-27 PROCEDURE — 93010 ELECTROCARDIOGRAM REPORT: CPT | Mod: Z6 | Performed by: FAMILY MEDICINE

## 2019-03-27 PROCEDURE — 84443 ASSAY THYROID STIM HORMONE: CPT | Performed by: FAMILY MEDICINE

## 2019-03-27 PROCEDURE — 12800008 ZZH R&B CD ADULT

## 2019-03-27 PROCEDURE — 25800030 ZZH RX IP 258 OP 636: Performed by: FAMILY MEDICINE

## 2019-03-27 PROCEDURE — 25000132 ZZH RX MED GY IP 250 OP 250 PS 637: Performed by: NURSE PRACTITIONER

## 2019-03-27 PROCEDURE — 96360 HYDRATION IV INFUSION INIT: CPT | Performed by: FAMILY MEDICINE

## 2019-03-27 PROCEDURE — 96361 HYDRATE IV INFUSION ADD-ON: CPT | Performed by: FAMILY MEDICINE

## 2019-03-27 PROCEDURE — 85730 THROMBOPLASTIN TIME PARTIAL: CPT | Performed by: FAMILY MEDICINE

## 2019-03-27 PROCEDURE — 84439 ASSAY OF FREE THYROXINE: CPT | Performed by: FAMILY MEDICINE

## 2019-03-27 PROCEDURE — 85610 PROTHROMBIN TIME: CPT | Performed by: FAMILY MEDICINE

## 2019-03-27 PROCEDURE — 80320 DRUG SCREEN QUANTALCOHOLS: CPT | Performed by: FAMILY MEDICINE

## 2019-03-27 PROCEDURE — 93005 ELECTROCARDIOGRAM TRACING: CPT | Performed by: FAMILY MEDICINE

## 2019-03-27 PROCEDURE — 80053 COMPREHEN METABOLIC PANEL: CPT | Performed by: FAMILY MEDICINE

## 2019-03-27 PROCEDURE — 85025 COMPLETE CBC W/AUTO DIFF WBC: CPT | Performed by: FAMILY MEDICINE

## 2019-03-27 RX ORDER — LIRAGLUTIDE 6 MG/ML
1.8 INJECTION SUBCUTANEOUS DAILY
Status: DISCONTINUED | OUTPATIENT
Start: 2019-03-28 | End: 2019-04-05 | Stop reason: HOSPADM

## 2019-03-27 RX ORDER — OLANZAPINE 10 MG/1
10 TABLET ORAL
Status: DISCONTINUED | OUTPATIENT
Start: 2019-03-27 | End: 2019-03-28

## 2019-03-27 RX ORDER — VENLAFAXINE 75 MG/1
150 TABLET ORAL DAILY
Status: ON HOLD | COMMUNITY
End: 2019-04-03

## 2019-03-27 RX ORDER — TRAZODONE HYDROCHLORIDE 100 MG/1
100 TABLET ORAL AT BEDTIME
Status: ON HOLD | COMMUNITY
End: 2019-04-03

## 2019-03-27 RX ORDER — OLANZAPINE 10 MG/2ML
10 INJECTION, POWDER, FOR SOLUTION INTRAMUSCULAR
Status: DISCONTINUED | OUTPATIENT
Start: 2019-03-27 | End: 2019-03-28

## 2019-03-27 RX ORDER — POLYETHYLENE GLYCOL 3350 17 G
2-4 POWDER IN PACKET (EA) ORAL
Status: DISCONTINUED | OUTPATIENT
Start: 2019-03-27 | End: 2019-04-05 | Stop reason: HOSPADM

## 2019-03-27 RX ORDER — ZOLPIDEM TARTRATE 12.5 MG/1
1 TABLET, FILM COATED, EXTENDED RELEASE ORAL AT BEDTIME
Refills: 3 | Status: ON HOLD | COMMUNITY
Start: 2019-03-20 | End: 2019-04-03

## 2019-03-27 RX ORDER — IRBESARTAN 150 MG/1
150 TABLET ORAL DAILY
Status: ON HOLD | COMMUNITY
End: 2019-04-03

## 2019-03-27 RX ORDER — ATORVASTATIN CALCIUM 40 MG/1
40 TABLET, FILM COATED ORAL DAILY
Status: DISCONTINUED | OUTPATIENT
Start: 2019-03-28 | End: 2019-04-05 | Stop reason: HOSPADM

## 2019-03-27 RX ORDER — ASPIRIN 81 MG/1
81 TABLET ORAL DAILY
Status: DISCONTINUED | OUTPATIENT
Start: 2019-03-28 | End: 2019-04-05 | Stop reason: HOSPADM

## 2019-03-27 RX ORDER — TRAZODONE HYDROCHLORIDE 100 MG/1
100 TABLET ORAL AT BEDTIME
Status: DISCONTINUED | OUTPATIENT
Start: 2019-03-27 | End: 2019-03-29

## 2019-03-27 RX ORDER — IBUPROFEN 800 MG/1
800 TABLET, FILM COATED ORAL EVERY 8 HOURS PRN
COMMUNITY

## 2019-03-27 RX ORDER — LEVOTHYROXINE SODIUM 137 UG/1
137 TABLET ORAL DAILY
Status: DISCONTINUED | OUTPATIENT
Start: 2019-03-28 | End: 2019-03-28

## 2019-03-27 RX ORDER — HYDROXYZINE HYDROCHLORIDE 25 MG/1
25 TABLET, FILM COATED ORAL EVERY 4 HOURS PRN
Status: DISCONTINUED | OUTPATIENT
Start: 2019-03-27 | End: 2019-03-29

## 2019-03-27 RX ORDER — ATORVASTATIN CALCIUM 40 MG/1
40 TABLET, FILM COATED ORAL DAILY
Status: ON HOLD | COMMUNITY
End: 2019-04-03

## 2019-03-27 RX ORDER — LEVOTHYROXINE SODIUM 137 UG/1
1 TABLET ORAL DAILY
Refills: 7 | Status: ON HOLD | COMMUNITY
Start: 2019-03-05 | End: 2019-03-31

## 2019-03-27 RX ORDER — PHENOBARBITAL 32.4 MG/1
64.8 TABLET ORAL 3 TIMES DAILY
Status: DISCONTINUED | OUTPATIENT
Start: 2019-03-27 | End: 2019-04-01

## 2019-03-27 RX ORDER — HYDROXYZINE HYDROCHLORIDE 10 MG/5ML
4 SYRUP ORAL EVERY 6 HOURS PRN
Status: ON HOLD | COMMUNITY
End: 2019-04-03

## 2019-03-27 RX ORDER — ALUMINA, MAGNESIA, AND SIMETHICONE 2400; 2400; 240 MG/30ML; MG/30ML; MG/30ML
30 SUSPENSION ORAL EVERY 4 HOURS PRN
Status: DISCONTINUED | OUTPATIENT
Start: 2019-03-27 | End: 2019-04-05 | Stop reason: HOSPADM

## 2019-03-27 RX ORDER — ASPIRIN 81 MG/1
81 TABLET ORAL DAILY
COMMUNITY

## 2019-03-27 RX ORDER — VENLAFAXINE 75 MG/1
150 TABLET ORAL DAILY
Status: DISCONTINUED | OUTPATIENT
Start: 2019-03-28 | End: 2019-03-29

## 2019-03-27 RX ORDER — LORAZEPAM 2 MG/1
3 TABLET ORAL EVERY 6 HOURS PRN
Status: ON HOLD | COMMUNITY
End: 2019-04-03

## 2019-03-27 RX ORDER — BISACODYL 10 MG
10 SUPPOSITORY, RECTAL RECTAL DAILY PRN
Status: DISCONTINUED | OUTPATIENT
Start: 2019-03-27 | End: 2019-04-05 | Stop reason: HOSPADM

## 2019-03-27 RX ORDER — SODIUM CHLORIDE, SODIUM LACTATE, POTASSIUM CHLORIDE, CALCIUM CHLORIDE 600; 310; 30; 20 MG/100ML; MG/100ML; MG/100ML; MG/100ML
1000 INJECTION, SOLUTION INTRAVENOUS CONTINUOUS
Status: DISCONTINUED | OUTPATIENT
Start: 2019-03-27 | End: 2019-03-29

## 2019-03-27 RX ADMIN — METFORMIN HYDROCHLORIDE 1000 MG: 500 TABLET ORAL at 21:09

## 2019-03-27 RX ADMIN — SODIUM CHLORIDE, POTASSIUM CHLORIDE, SODIUM LACTATE AND CALCIUM CHLORIDE 1000 ML: 600; 310; 30; 20 INJECTION, SOLUTION INTRAVENOUS at 16:07

## 2019-03-27 RX ADMIN — HYDROXYZINE HYDROCHLORIDE 25 MG: 25 TABLET ORAL at 23:58

## 2019-03-27 RX ADMIN — PHENOBARBITAL 64.8 MG: 64.8 TABLET ORAL at 22:12

## 2019-03-27 RX ADMIN — TRAZODONE HYDROCHLORIDE 100 MG: 100 TABLET ORAL at 21:09

## 2019-03-27 RX ADMIN — NICOTINE POLACRILEX 4 MG: 2 LOZENGE ORAL at 21:09

## 2019-03-27 RX ADMIN — IBUPROFEN 800 MG: 600 TABLET ORAL at 23:58

## 2019-03-27 ASSESSMENT — ACTIVITIES OF DAILY LIVING (ADL)
AMBULATION: 0-->INDEPENDENT
RETIRED_EATING: 0-->INDEPENDENT
DRESS: STREET CLOTHES
COGNITION: 0 - NO COGNITION ISSUES REPORTED
BATHING: 0-->INDEPENDENT
TRANSFERRING: 0-->INDEPENDENT
ORAL_HYGIENE: INDEPENDENT
RETIRED_COMMUNICATION: 0-->UNDERSTANDS/COMMUNICATES WITHOUT DIFFICULTY
TOILETING: 0-->INDEPENDENT
NUMBER_OF_TIMES_PATIENT_HAS_FALLEN_WITHIN_LAST_SIX_MONTHS: 1
DRESS: 0-->INDEPENDENT
LAUNDRY: WITH SUPERVISION
SWALLOWING: 0-->SWALLOWS FOODS/LIQUIDS WITHOUT DIFFICULTY
FALL_HISTORY_WITHIN_LAST_SIX_MONTHS: YES
HYGIENE/GROOMING: INDEPENDENT

## 2019-03-27 ASSESSMENT — MIFFLIN-ST. JEOR: SCORE: 1753.8

## 2019-03-27 NOTE — ED PROVIDER NOTES
"  History     Chief Complaint   Patient presents with     Suicide Attempt     per pt took 100 pills of Avapro 150mg tablets and 2 full bottles of levothyroxine 135mg/100mg tablets on monday at went to bed. denies any medical intervention for this OD.      Addiction Problem     wants to get into [facility] but requires detox first. detox from benzos and ambien.      Suicidal     currently feels SI but denies having any plan of OD or specific plan      The history is provided by the patient and medical records.     Tanner Villatoro is a 64 year old male with a history of type 2 diabetes, chronic pain syndrome, and anxiety and depression, who presents to the Emergency Department seeking detoxification from benzodiazepines and Ambien.  The patient reports he has been taking benzodiazepines for the past nine years, however, he only recently began abusing them in the past 6-12 months.  He endorses taking 4 - 8, 1 mg tablets of lorazepam per day (his rxn is 3 tabs/day), with his last lorazepam on Friday (03/29/2019).  The patient also endorses abusing Ambien, taking a 30 day prescription in 10 days; averaging of 3 pills / day. The patient recalls an episode where he slept for \"5 days straight\".  The patient reports a suicide attempt on Monday (03/25/2019); he states he took \"two bottles\" of his blood pressure medication, as well as, \"two bottles\" of levothyroxine.  He states that he experienced diarrhea, dizziness, and gait instability that lasted through Tuesday (03/26/2019).  At this time, the patient complains of restlessness, insomnia, tremors, and suicidal ideation.  He denies a history of seizures.    I have reviewed the Medications, Allergies, Past Medical and Surgical History, and Social History in the [employer] system.    Past Medical History:   Diagnosis Date     Anxiety      Coronary artery disease     stent x 2     Diabetes mellitus (H)     type 2     Headaches      Heart attack      Hyperlipemia      Hypertension      " Insomnia, unspecified      Sleep apnea        Past Surgical History:   Procedure Laterality Date     CARDIAC SURGERY      stent x2     ENDOSCOPIC BALLOON SINUPLASTY ACCLARENT  2012    Procedure:ENDOSCOPIC BALLOON SINUPLASTY ACCLARENT; BILATERAL ENDOSCOPIC ETHMOIDECTOMY, MAXILLARY ANTROSTOMY, FRONTAL SINUSOSTOMY BILATERAL ; Surgeon:ONI CARRERA; Location:[facility]     GENITOURINARY SURGERY      vasectomy     ORTHOPEDIC SURGERY      ankle cystectomy       No family history on file.    Social History     Tobacco Use     Smoking status: Former Smoker     Last attempt to quit: 1/15/2010     Years since quittin.2   Substance Use Topics     Alcohol use: No       Current Facility-Administered Medications   Medication     lactated ringers BOLUS 1,000 mL    Followed by     lactated ringers infusion     Current Outpatient Medications   Medication     ASPIRIN PO     Atorvastatin Calcium (LIPITOR PO)     IBUPROFEN PO     Irbesartan (AVAPRO PO)     LEVOTHYROXINE SODIUM PO     liraglutide (VICTOZA PEN) 18 MG/3ML soln     LORazepam (ATIVAN) 2 MG tablet     metFORMIN (GLUCOPHAGE) 1000 MG tablet     Mirtazapine (REMERON PO)     PARoxetine (PAXIL) 10 MG tablet     QUEtiapine (SEROQUEL) 50 MG tablet     TRAZODONE HCL PO     venlafaxine (EFFEXOR) 75 MG tablet     QUEtiapine Fumarate (SEROQUEL PO)      No Known Allergies    Review of Systems  ROS: 14 point ROS neg other than the symptoms noted above in the HPI.    Physical Exam   BP: 144/71  Pulse: 113  Temp: 98.7  F (37.1  C)  Resp: 18  Weight: 93 kg (205 lb)  SpO2: 95 %      Physical Exam   Constitutional: He is oriented to person, place, and time. No distress.   HENT:   Head: Atraumatic.   Mouth/Throat: Oropharynx is clear and moist. Mucous membranes are dry.   Eyes: Pupils are equal, round, and reactive to light. No scleral icterus.   Neck: Neck supple. No thyromegaly present.   Cardiovascular: Regular rhythm, normal heart sounds and intact distal pulses. Tachycardia  present.   No murmur heard.  Heart rate 110   Pulmonary/Chest: Breath sounds normal. No respiratory distress.   Abdominal: Soft. Bowel sounds are normal. There is no tenderness.   Musculoskeletal: Normal range of motion. He exhibits no edema or tenderness.   Neurological: He is alert and oriented to person, place, and time. He has normal reflexes. He displays tremor (Slight tremor which could be due to benzodiazepine withdrawal and/or thyrotoxicosis). No cranial nerve deficit. He exhibits normal muscle tone. Coordination normal.   Skin: Skin is warm. No rash noted. He is not diaphoretic.   Psychiatric: His speech is normal and behavior is normal. Judgment normal. His mood appears anxious. Thought content is not paranoid and not delusional. Cognition and memory are normal. He exhibits a depressed mood. He expresses suicidal (Patient admits to recent overdose.  He still has some suicidal thoughts but states he is able to contract for safety and has no plan or intent to act upon them.  He believes he can be safe in the detox unit.) ideation. He expresses no homicidal ideation. He expresses no suicidal plans.       ED Course   3:13 PM  The patient was seen and examined by Dr. Drummond in Room ED07.        Procedures             EKG Interpretation:      Interpreted by Shaun Drummond  Time reviewed: 1521  Symptoms at time of EKG: overdose   Rhythm: sinus tachycardia  Rate: 100-110  Axis: Normal  Ectopy: none  Conduction: normal  ST Segments/ T Waves: Non-specific ST-T wave changes  Q Waves: inferior leads  Comparison to prior: Unchanged from 04/2018    Clinical Impression: Sinus tachycardia with age-indeterminate inferior infarct and nonspecific ST-T changes, unchanged from April 2018.                Critical Care time:  none             Labs Ordered and Resulted from Time of ED Arrival Up to the Time of Departure from the ED   COMPREHENSIVE METABOLIC PANEL - Abnormal; Notable for the following components:       Result  Value    Glucose 147 (*)     Calcium 10.2 (*)     All other components within normal limits   TSH WITH FREE T4 REFLEX - Abnormal; Notable for the following components:    TSH 0.11 (*)     All other components within normal limits   T4 FREE - Abnormal; Notable for the following components:    T4 Free >8.00 (*)     All other components within normal limits   CBC WITH PLATELETS DIFFERENTIAL   INR   PARTIAL THROMBOPLASTIN TIME   SALICYLATE LEVEL   ACETAMINOPHEN LEVEL   ALCOHOL ETHYL   TROPONIN I   DRUG ABUSE SCREEN 6 CHEM DEP URINE ([facility])   T4 FREE   PULSE OXIMETRY NURSING   CARDIAC CONTINUOUS MONITORING   PERIPHERAL IV CATHETER            Assessments & Plan (with Medical Decision Making)   Patient with a long-standing history of substance abuse and mental illness.  He is presenting today requesting detox from benzodiazepines.  He has a pattern of use, overuse, and misuse of benzodiazepines over months to years.  Last use 3 days ago and he is experiencing withdrawal symptoms.  In addition, he reported an intentional drug overdose approximately 48 hours ago.  Though he had rather significant symptoms at the time of the ingestion, he is asymptomatic from that standpoint at this time and has a negative medical workup.  Given the profile of potential drug toxicity and elimination half life of the substances ingested, it appears he will not have any acute life-threatening consequences or permanent, long-standing consequences of the ingestion.  He remains stable in terms of his exam, vital signs and labs and appears appropriate for voluntary admission to our detox unit.  He is planning on chemical dependency treatment at [facility] after detox.    I have reviewed the nursing notes.    I have reviewed the findings, diagnosis, plan and need for follow up with the patient.       Medication List      There are no discharge medications for this visit.         Final diagnoses:   Benzedrine use disorder, severe, dependence (H)    Intentional drug overdose, initial encounter (H)   I, Chidi Catalan, am serving as a trained medical scribe to document services personally performed by Shaun Drummond MD, based on the provider's statements to me.      I, Shaun Drummond MD, was physically present and have reviewed and verified the accuracy of this note documented by Chidi Catalan.     3/27/2019   [facility] [place], EMERGENCY DEPARTMENT     Shaun Drummond MD  03/27/19 8308

## 2019-03-27 NOTE — PHARMACY-ADMISSION MEDICATION HISTORY
Admission Medication History status for the 3/27/2019 admission is complete.  See EPIC admission navigator for Prior to Admission medications.    Medication history sources:  Patient, Patient's Son, [employer]     Medication history source reliability: Good    Medication adherence:  Good    Changes made to PTA medication list (reason)  Added: Tylenol PM, chlorpheniramine, zolpidem (Pt taking all 3)  Deleted: Mirtazapine, paroxetine, quetiapine (Pt no longer taking these)  Changed:   -Levothyroxine 75 mcg daily-> 137.5 mcg daily  -Liraglutide 1.2 mcg daily->1.8mcg daily  -Metformin 1000mg tablet 1 tablet by mouth twice daily-> 500mg tablet 2 tablets by mouth twice daily  -Trazodone 50mg by mouth at bedtime-> 100mg by mouth at bedtime  -Venlafaxine 75mg by mouth 2 times daily-> 75mg by mouth daily    Additional medication history information (including reliability of information, actions taken by pharmacist):   -Pt and his son were reliable historians for his medication history  -Confirmed doses and directions for medications below with [employer] Fill History  -Pt reports being prescribed to take two 500mg metformin tablets twice daily but due to diarrhea has only been taking two once daily  -Pt was recently prescribed trazodone 100mg tablets from one MD and 150mg tablets from another. Pt reports he was taking 100mg nightly until last Saturday where he took four 100mg tablets and then had his trazodone taken from him by his kids  -On 3/7 pt's venlafaxine prescription said to take 1 capsule by mouth daily for 1 week and then increase to 2 capsules daily but pt is currently only taking 1 daily    Time spent in this activity: 20min    Medication history completed by: Hansel Gary, Pharmacy Intern       Prior to Admission medications    Medication Sig Last Dose Taking? Auth Provider   aspirin 81 MG EC tablet Take 81 mg by mouth daily 3/26/2019 at PM Yes Unknown, Entered By History   atorvastatin (LIPITOR) 40 MG  tablet Take 40 mg by mouth daily 3/26/2019 at PM Yes Unknown, Entered By History   chlorpheniramine (CHLOR-TRIMETON) 4 MG tablet Take 4 mg by mouth every 6 hours as needed for allergies or rhinitis 3/26/2019 at PM Yes Unknown, Entered By History   diphenhydrAMINE-acetaminophen (TYLENOL PM)  MG tablet Take 1 tablet by mouth At Bedtime Past Week at Unknown time Yes Unknown, Entered By History   ibuprofen (ADVIL/MOTRIN) 800 MG tablet Take 800 mg by mouth every 8 hours as needed for moderate pain 3/26/2019 at PM Yes Unknown, Entered By History   irbesartan (AVAPRO) 150 MG tablet Take 150 mg by mouth daily Past Week at Unknown time Yes Unknown, Entered By History   levothyroxine (SYNTHROID/LEVOTHROID) 137 MCG tablet Take 1 tablet by mouth daily Past Week at Unknown time Yes Unknown, Entered By History   liraglutide (VICTOZA PEN) 18 MG/3ML soln Inject 1.8 mg Subcutaneous daily  3/26/2019 at PM Yes Barthell, Joanna Kersten Ulmen, PA-C   LORazepam (ATIVAN) 2 MG tablet Take 3 mg by mouth every 6 hours as needed for anxiety Past Week at Unknown time Yes Reported, Patient   metFORMIN (GLUCOPHAGE) 500 MG tablet Take 1,000 mg by mouth 2 times daily (with meals) 3/26/2019 at PM Yes Unknown, Entered By History   traZODone (DESYREL) 100 MG tablet Take 100 mg by mouth At Bedtime Past Week at Unknown time Yes Unknown, Entered By History   venlafaxine (EFFEXOR) 75 MG tablet Take 75 mg by mouth daily  3/27/2019 at AM Yes Reported, Patient   zolpidem ER (AMBIEN CR) 12.5 MG CR tablet Take 1 tablet by mouth At Bedtime Past Week at Unknown time Yes Unknown, Entered By History

## 2019-03-28 LAB
ANION GAP SERPL CALCULATED.3IONS-SCNC: 11 MMOL/L (ref 3–14)
BUN SERPL-MCNC: 16 MG/DL (ref 7–30)
CALCIUM SERPL-MCNC: 9.3 MG/DL (ref 8.5–10.1)
CHLORIDE SERPL-SCNC: 104 MMOL/L (ref 94–109)
CO2 SERPL-SCNC: 24 MMOL/L (ref 20–32)
CREAT SERPL-MCNC: 0.83 MG/DL (ref 0.66–1.25)
GFR SERPL CREATININE-BSD FRML MDRD: >90 ML/MIN/{1.73_M2}
GLUCOSE BLDC GLUCOMTR-MCNC: 123 MG/DL (ref 70–99)
GLUCOSE BLDC GLUCOMTR-MCNC: 126 MG/DL (ref 70–99)
GLUCOSE SERPL-MCNC: 112 MG/DL (ref 70–99)
INTERPRETATION ECG - MUSE: NORMAL
POTASSIUM SERPL-SCNC: 3.6 MMOL/L (ref 3.4–5.3)
SODIUM SERPL-SCNC: 139 MMOL/L (ref 133–144)
T4 FREE SERPL-MCNC: >8 NG/DL (ref 0.76–1.46)
TSH SERPL DL<=0.005 MIU/L-ACNC: 0.04 MU/L (ref 0.4–4)

## 2019-03-28 PROCEDURE — 84443 ASSAY THYROID STIM HORMONE: CPT | Performed by: PHYSICIAN ASSISTANT

## 2019-03-28 PROCEDURE — 25000125 ZZHC RX 250: Performed by: NURSE PRACTITIONER

## 2019-03-28 PROCEDURE — 84439 ASSAY OF FREE THYROXINE: CPT | Performed by: PHYSICIAN ASSISTANT

## 2019-03-28 PROCEDURE — 99223 1ST HOSP IP/OBS HIGH 75: CPT | Performed by: PHYSICIAN ASSISTANT

## 2019-03-28 PROCEDURE — 99221 1ST HOSP IP/OBS SF/LOW 40: CPT | Mod: AI | Performed by: PSYCHIATRY & NEUROLOGY

## 2019-03-28 PROCEDURE — 99207 ZZC CONSULT E&M CHANGED TO INITIAL LEVEL: CPT | Performed by: PHYSICIAN ASSISTANT

## 2019-03-28 PROCEDURE — 12400001 ZZH R&B MH UMMC

## 2019-03-28 PROCEDURE — 25000132 ZZH RX MED GY IP 250 OP 250 PS 637: Performed by: PHYSICIAN ASSISTANT

## 2019-03-28 PROCEDURE — 25000132 ZZH RX MED GY IP 250 OP 250 PS 637: Performed by: PSYCHIATRY & NEUROLOGY

## 2019-03-28 PROCEDURE — 80048 BASIC METABOLIC PNL TOTAL CA: CPT | Performed by: PHYSICIAN ASSISTANT

## 2019-03-28 PROCEDURE — 00000146 ZZHCL STATISTIC GLUCOSE BY METER IP

## 2019-03-28 PROCEDURE — 36415 COLL VENOUS BLD VENIPUNCTURE: CPT | Performed by: PHYSICIAN ASSISTANT

## 2019-03-28 PROCEDURE — 25000132 ZZH RX MED GY IP 250 OP 250 PS 637: Performed by: NURSE PRACTITIONER

## 2019-03-28 RX ORDER — DEXTROSE MONOHYDRATE 25 G/50ML
25-50 INJECTION, SOLUTION INTRAVENOUS
Status: DISCONTINUED | OUTPATIENT
Start: 2019-03-28 | End: 2019-04-05 | Stop reason: HOSPADM

## 2019-03-28 RX ORDER — NICOTINE POLACRILEX 4 MG
15-30 LOZENGE BUCCAL
Status: DISCONTINUED | OUTPATIENT
Start: 2019-03-28 | End: 2019-04-05 | Stop reason: HOSPADM

## 2019-03-28 RX ORDER — PROPRANOLOL HYDROCHLORIDE 20 MG/1
20 TABLET ORAL 3 TIMES DAILY
Status: DISCONTINUED | OUTPATIENT
Start: 2019-03-28 | End: 2019-03-31

## 2019-03-28 RX ORDER — OLANZAPINE 5 MG/1
5 TABLET, ORALLY DISINTEGRATING ORAL 3 TIMES DAILY PRN
Status: DISCONTINUED | OUTPATIENT
Start: 2019-03-28 | End: 2019-03-29

## 2019-03-28 RX ADMIN — NICOTINE POLACRILEX 4 MG: 2 LOZENGE ORAL at 17:46

## 2019-03-28 RX ADMIN — OLANZAPINE 5 MG: 5 TABLET, ORALLY DISINTEGRATING ORAL at 09:50

## 2019-03-28 RX ADMIN — VENLAFAXINE HYDROCHLORIDE 150 MG: 37.5 TABLET ORAL at 08:28

## 2019-03-28 RX ADMIN — NICOTINE POLACRILEX 4 MG: 2 LOZENGE ORAL at 19:46

## 2019-03-28 RX ADMIN — ASPIRIN 81 MG: 81 TABLET, COATED ORAL at 17:47

## 2019-03-28 RX ADMIN — PHENOBARBITAL 64.8 MG: 64.8 TABLET ORAL at 14:55

## 2019-03-28 RX ADMIN — HYDROXYZINE HYDROCHLORIDE 25 MG: 25 TABLET ORAL at 12:36

## 2019-03-28 RX ADMIN — PROPRANOLOL HYDROCHLORIDE 20 MG: 20 TABLET ORAL at 14:55

## 2019-03-28 RX ADMIN — PROPRANOLOL HYDROCHLORIDE 20 MG: 20 TABLET ORAL at 19:46

## 2019-03-28 RX ADMIN — TRAZODONE HYDROCHLORIDE 100 MG: 100 TABLET ORAL at 22:22

## 2019-03-28 RX ADMIN — NICOTINE POLACRILEX 4 MG: 2 LOZENGE ORAL at 22:22

## 2019-03-28 RX ADMIN — NICOTINE POLACRILEX 4 MG: 2 LOZENGE ORAL at 11:42

## 2019-03-28 RX ADMIN — IBUPROFEN 800 MG: 600 TABLET ORAL at 17:47

## 2019-03-28 RX ADMIN — ATORVASTATIN CALCIUM 40 MG: 40 TABLET, FILM COATED ORAL at 17:47

## 2019-03-28 RX ADMIN — HYDROXYZINE HYDROCHLORIDE 25 MG: 25 TABLET ORAL at 17:47

## 2019-03-28 RX ADMIN — NICOTINE POLACRILEX 4 MG: 2 LOZENGE ORAL at 16:18

## 2019-03-28 RX ADMIN — LIRAGLUTIDE 1.8 MG: 6 INJECTION SUBCUTANEOUS at 17:46

## 2019-03-28 RX ADMIN — NICOTINE POLACRILEX 4 MG: 2 LOZENGE ORAL at 00:05

## 2019-03-28 RX ADMIN — PHENOBARBITAL 64.8 MG: 64.8 TABLET ORAL at 19:46

## 2019-03-28 RX ADMIN — PHENOBARBITAL 64.8 MG: 64.8 TABLET ORAL at 08:28

## 2019-03-28 RX ADMIN — NICOTINE POLACRILEX 4 MG: 2 LOZENGE ORAL at 08:33

## 2019-03-28 RX ADMIN — METFORMIN HYDROCHLORIDE 1000 MG: 500 TABLET ORAL at 08:28

## 2019-03-28 ASSESSMENT — ACTIVITIES OF DAILY LIVING (ADL)
ORAL_HYGIENE: INDEPENDENT
HYGIENE/GROOMING: INDEPENDENT
DRESS: SCRUBS (BEHAVIORAL HEALTH)
LAUNDRY: UNABLE TO COMPLETE

## 2019-03-28 NOTE — PLAN OF CARE
Patient arrived the unit from ED, here to detox from benzo.  Per report, patient using 6-10 mg of lorazepam daily.  Also, abusing Ambien using 30 mg daily.  Patient told staff last use for lorazepam and Ambien was Friday.  Patient also, reports overdose Avapro on Monday.  Patient reports having passive SI but denies having a plan or intent.  Patient is able to contract for safety.  Patient pleasant and cooperative upon arrived the unit.  Told staff this is his first detox.  MSSA 13 and 11, patient received scheduled phenobarbital.  Patient claimed feel much better after taking the phenobarbital.  Patient is type 2 diabetic, his BG at bed time 158.  Received scheduled metformin.  Patient told staff he doesn't do any blood sugar.  Patient to be seen in the morning by internal medicine per order.

## 2019-03-28 NOTE — PROGRESS NOTES
Case Management Note  3/28/2019    Writer informed pt will be transferred to a mental health unit. Case management complete.    Ulices Gibbs MA, LADC

## 2019-03-28 NOTE — PLAN OF CARE
Pt presents with mostly flat affect, does appear tense and anxious at times as well. Pt reports depression and anxiety rated at 8 out of 10 (10 = highest). Pt received olanzapine 5mg at 0950 today, which provided a small amount of relief, and hydroxyzine 25 mg at 1230 for continued anxiety. Pt shows mild EtOH withdrawal with MSSA scores of 8 and 7 today.   Pt reports a wish to die, without any further intention toward self harm or suicide. Pt states he thinks he would be better served on a MH unit, as he is struggling most with depression and anxiety. Pt reports he feels safe here and would alert staff if thoughts of wanting to die were to increase.  Pt is type 2 diabetic, BG checks BID (123 this morning) and takes victoza at 1800.   No other concerns at this time. Nursing will continue to monitor and assess.

## 2019-03-28 NOTE — CONSULTS
[facility], [place]  Consult Note - Hospitalist Service     Date of Admission:  3/27/2019  Consult Requested by: Dr. Mchugh  Reason for Consult: Medical co-management    Assessment & Plan   Tanner Villatoro is a 64 year old male with a history of depression/anxiety, substance abuse, DM type II, hypothyroidism, HTN, HLD, and hx of MI in 2010 s/p PCI, who was admitted on 3/27/19 to station 3A from the ED seeking detox from benzodiazepines and Ambien, as well as suicide attempt via Avapro and Synthroid ingestion.     Suicidal ideation, depression/anxiety: Suicide attempt via Synthroid and Avapro overdose on 3/25 as per below. Tylenol, ethanol, and salicylate levels all negative in the ED. PTA on Effexor, Ativan, trazodone.    - Management per Psychiatry    Polysubstance abuse: Abusing Ativan and Ambien for last 10-12 months. Reports monthly benzodiazepine withdrawals. No history of withdrawal seizures or other complications. Last used both substances on 3/22. UTox in the ED negative, ethanol negative. MSSA currently 7, but suspect his symptoms are secondary to his Synthroid ingestion and not withdrawal given his last benzo use was on 3/22.   - Continue on Phenobarbital   - [facility] protocol    Synthroid overdose  Hypothyroidism  Ingested approximately 135 of his 137 mcg Synthroid tablets on the afternoon of 3/25 in suicide attempt. Last TSH was 3.31 in April 2018, now 0.11 on admission --> 0.04 today. Free T4 >8.00. EKG with sinus tachycardia, no arrhythmias or ischemic changes. Is hemodynamically stable. HR slightly improved, stable in the high 90s. Pt with 1 day of dizziness and diarrhea on 3/26, now resolved. Currently with tachycardia, flushing, tremor, and anxiety - most likely due to Synthroid overdose vs less likely benzodiazepine withdrawal given last Ativan use on 3/22.    - Discussed with Endocrine, who recommends daily lab monitoring at this time and will formally consult tomorrow.    - Discussed with Poison Control, who reports patient's symptoms could still worsen over the next few days and would expect symptoms to begin resolving within 10 days of his ingestion.    - Ideally, Poison Control recommends using benzodiazepines to treat symptoms, though contraindicated given his Ativan abuse, felt Phenobarbital could provide some mild relief.    - Poison Control recommends daily K checks due to risk of hypokalemia   - Start propranolol 20 mg TID, holding for SBP <100 or HR <50   - Holding PTA Synthroid   - Repeat EKG, BMP in the morning   - TSH, free T4, total T3 in the morning   - Trend daily BMP, TSH, free T4   - Will also check Total T3 in the morning   - Repeat EKG in the morning   - Vital checks q4h, notify medicine if patient persistently tachycardic with HR >110   - Notify medicine if patient develops new cardiac symptoms, if HR persistently >110, If patient develops new cardiac symptoms, consider transfer to medical unit for tele monitoring    Avapro overdose: Ingested approximately 135 of his 150 mg tablets on the afternoon of 3/25 in suicide attempt. Blood pressures have been stable since admission with SBP >100.    - Discussed with Poison Control today, no indication for further monitoring at this time   - Holding PTA Avapro   - Vital signs q4h    Diabetes mellitus, type II: Last known HgbA1C 9.9 in April 2018, PCP records not available. PTA on Victoza, Metformin.    - Continue on PTA Victoza, Metformin   - Blood glucose checks BID   - Hypoglycemia protocol    Hx of HTN, HLD, CAD with hx of MI in 2010: EKG on 3/27 with sinus tachycardia, no ischemic changes. Avapro overdose on 3/25 as per above. Blood pressures controlled since admission.   - Holding PTA Avapro as per above   - Continue PTA atorvastatin, ASA 81      The patient's care was discussed with the Bedside Nurse, Patient and Primary team.    Medicine will continue to follow. Please page if any questions or concerns arise.  "    Dodie Castellon PA-C  [facility], [place]  Hospitalist Service  Pager: [phone]    ______________________________________________________________________    Chief Complaint   Suicidal ideation    History is obtained from the patient    History of Present Illness   Tanner Villatoro is a 64 year old male with a history of depression/anxiety, substance abuse, DM type II, hypothyroidism, HTN, HLD, and hx of MI in 2010 s/p PCI, who was admitted on 3/27/19 to station 3A from the ED seeking detox from benzodiazepines and Ambien, as well as suicide attempt via Avapro and Synthroid ingestion. He has been taking benzodiazepines for 9 years since his MI in 2010 and has been abusing them for the past year. He has been taking 4-8 mg of lorazepam per day, last used on 3/22. He also has been abusing his Ambien over the last several months, which he also has been on for years. He has been taking 3 pills per day, last used on 3/22. He reports going into benzodiazepine withdrawal once monthly for the past 10 months. Denies any history of withdrawal seizures or other withdrawal complications. Denies alcohol use or other illicit drug use.     On the afternoon of 3/25, patient reports taking an entire 90-day supply plus an additional half-bottle (total of approximately 135 pills) of Synthroid 137 mcg. He also took about the same number of pills of his Avapro 150 mg tablets. He felt dizzy, \"out of it,\" and had diarrhea on 3/26. Yesterday he became anxious and tremulous, which he attributed to benzodiazepine withdrawal and subsequently brought himself to the ED for evaluation.     Today he continues to feel tremulous and anxious. He can feel his heart \"beating fast\" and is now feeling flushed this morning. Denies any chest pain/pressure, dyspnea, dizziness, lightheadedness, headaches, hearing/vision changes. Is no longer having diarrhea. No nausea/vomiting, abdominal pain, fever/chills, urinary " symptoms.     He has a history of diabetes, which is controlled on Metformin and Victoza. No diabetes-associated complications. He had an MI in 2010 and had 2 stents placed at that time. No cardiac events since. He reports his blood pressure has been well-controlled at home.     Review of Systems   The 10 point Review of Systems is negative other than noted in the HPI or here.     Past Medical History    I have reviewed this patient's medical history and updated it with pertinent information if needed.   Past Medical History:   Diagnosis Date     Anxiety      Coronary artery disease     stent x 2     Diabetes mellitus (H)     type 2     Headaches      Heart attack (H)      Hyperlipemia      Hypertension      Insomnia, unspecified      Sleep apnea        Past Surgical History   I have reviewed this patient's surgical history and updated it with pertinent information if needed.  Past Surgical History:   Procedure Laterality Date     CARDIAC SURGERY      stent x2     ENDOSCOPIC BALLOON SINUPLASTY ACCLARENT  2012    Procedure:ENDOSCOPIC BALLOON SINUPLASTY ACCLARENT; BILATERAL ENDOSCOPIC ETHMOIDECTOMY, MAXILLARY ANTROSTOMY, FRONTAL SINUSOSTOMY BILATERAL ; Surgeon:ONI CARRERA; Location[facility]     GENITOURINARY SURGERY      vasectomy, cyst removal     ORTHOPEDIC SURGERY      ankle cystectomy       Social History   I have reviewed this patient's social history and updated it with pertinent information if needed.  Social History     Tobacco Use     Smoking status: Former Smoker     Last attempt to quit: 1/15/2010     Years since quittin.2     Smokeless tobacco: Never Used   Substance Use Topics     Alcohol use: No     Drug use: No       Family History   I have reviewed this patient's family history and updated it with pertinent information if needed.   Family History   Problem Relation Age of Onset     Diabetes Type 1 Father        Medications   Current Facility-Administered Medications   Medication      alum & mag hydroxide-simethicone (MYLANTA ES/MAALOX  ES) suspension 30 mL     aspirin EC tablet 81 mg     atorvastatin (LIPITOR) tablet 40 mg     bisacodyl (DULCOLAX) Suppository 10 mg     glucose gel 15-30 g    Or     dextrose 50 % injection 25-50 mL    Or     glucagon injection 1 mg     hydrOXYzine (ATARAX) tablet 25 mg     ibuprofen (ADVIL/MOTRIN) tablet 800 mg     lactated ringers infusion     liraglutide (VICTOZA) injection 1.8 mg     magnesium hydroxide (MILK OF MAGNESIA) suspension 30 mL     metFORMIN (GLUCOPHAGE) tablet 1,000 mg     nicotine (COMMIT) lozenge 2-4 mg     OLANZapine zydis (zyPREXA) ODT tab 5 mg     PHENobarbital (LUMINAL) tablet 64.8 mg     traZODone (DESYREL) tablet 100 mg     venlafaxine (EFFEXOR) tablet 150 mg       Allergies   No Known Allergies    Physical Exam   Vital Signs: Temp: 98.6  F (37  C) Temp src: Tympanic BP: 136/76 Pulse: 98 Heart Rate: 93 Resp: 16 SpO2: 95 % O2 Device: None (Room air)    Weight: 210 lbs 0 oz    Constitutional: Awake and alert. Sitting up in chair. Restless but in no apparent distress.   Eyes: Sclera clear, anicteric. PERRL.  Respiratory: Breathing comfortably on room air. CTA bilaterally with no crackles, wheezing, or rhonchi.   Cardiovascular: Tachycardic, regular rhythm. Normal S1/S2. No rubs or murmurs. Bilateral pedal pulses intact. No peripheral edema.   GI: Normoactive bowel sounds. Soft, non-tender, non-distended. No palpable masses or hepatomegaly.   Skin: Warm and dry. No diaphoresis. Good color. No jaundice. No visible rashes or lesions of concern.  Neurologic: Mild resting tremor. Alert and oriented. CN II-XII intact. Facies symmetric. Patellar and triceps DTRs 2+ bilaterally.   Neuropsych: Anxious. Good eye contact.    Data   Results for orders placed or performed during the hospital encounter of 03/27/19 (from the past 24 hour(s))   Drug abuse screen 6 urine (chem dep)   Result Value Ref Range    Amphetamine Qual Urine Negative NEG^Negative     Barbiturates Qual Urine Negative NEG^Negative    Benzodiazepine Qual Urine Negative NEG^Negative    Cannabinoids Qual Urine Negative NEG^Negative    Cocaine Qual Urine Negative NEG^Negative    Ethanol Qual Urine Negative NEG^Negative    Opiates Qualitative Urine Negative NEG^Negative   Glucose by meter   Result Value Ref Range    Glucose 158 (H) 70 - 99 mg/dL   Glucose by meter   Result Value Ref Range    Glucose 123 (H) 70 - 99 mg/dL   Basic metabolic panel   Result Value Ref Range    Sodium 139 133 - 144 mmol/L    Potassium 3.6 3.4 - 5.3 mmol/L    Chloride 104 94 - 109 mmol/L    Carbon Dioxide 24 20 - 32 mmol/L    Anion Gap 11 3 - 14 mmol/L    Glucose 112 (H) 70 - 99 mg/dL    Urea Nitrogen 16 7 - 30 mg/dL    Creatinine 0.83 0.66 - 1.25 mg/dL    GFR Estimate >90 >60 mL/min/[1.73_m2]    GFR Estimate If Black >90 >60 mL/min/[1.73_m2]    Calcium 9.3 8.5 - 10.1 mg/dL   TSH   Result Value Ref Range    TSH 0.04 (L) 0.40 - 4.00 mU/L   T4 free   Result Value Ref Range    T4 Free >8.00 (H) 0.76 - 1.46 ng/dL

## 2019-03-28 NOTE — PROGRESS NOTES
03/27/19 1911   Patient Belongings   Did you bring any home meds/supplements to the hospital?  Yes   Disposition of meds  Sent to security/pharmacy per site process;Other (see comment)   Patient Belongings other (see comments)   Belongings Search Yes   Clothing Search Yes   Second Staff Aris     STORAGE BIN:   belt, coat, cloth    MED ROOM BIN:   wallet, cell phone (Phone taken with wife on 4/1/19)    SECURITY:   2 MC, $92, target, ins, meds  A             Admission:  I am responsible for any personal items that are not sent to the safe or pharmacy.  [facility] is not responsible for loss, theft or damage of any property in my possession.    Signature:  _________________________________ Date: _______  Time: _____                                              Staff Signature:  ____________________________ Date: ________  Time: _____      2nd Staff person, if patient is unable/unwilling to sign:    Signature: ________________________________ Date: ________  Time: _____   Discharge:  [facility] has returned all of my personal belongings:    Signature: _________________________________ Date: ________  Time: _____                                          Staff Signature:  ____________________________ Date: ________  Time: _____

## 2019-03-28 NOTE — PLAN OF CARE
Behavioral Team Discussion: (3/28/2019)    Continued Stay Criteria/Rationale: Patient admitted for Chemical Use Issues.  Plan: The following services will be provided to the patient; psychiatric assessment, medication management, therapeutic milieu, individual and group support, and skills groups.   Participants: 3A Provider: Dr. Anton Mchugh MD; 3A RN's: Daniela Becerril, RN; 3A CM's: Betty Samson , Ulices Gibbs and Charlotte Wild.  Summary/Recommendation: Providers will assess today for treatment recommendations, discharge planning, and aftercare plans. CM will meet with pt for discharge planning.   Medical/Physical: Deferred (see medical notes).  Precautions:   Behavioral Orders   Procedures     Code 1 - Restrict to Unit     Routine Programming     As clinically indicated     Status 15     Every 15 minutes.     Suicide precautions     Patients on Suicide Precautions should have a Combination Diet ordered that includes a Diet selection(s) AND a Behavioral Tray selection for Safe Tray - with utensils, or Safe Tray - NO utensils       Withdrawal precautions     Rationale for change in precautions or plan: N/A  Progress: Improving.    PT SEEN   AGREE WITH ASSESSMENT AND PLAN

## 2019-03-28 NOTE — H&P
"Admitted:     03/27/2019      IDENTIFYING INFORMATION:  The patient is a 64-year-old  male.  He is  and has 3 children.  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  He has a psychiatrist by the name of Dr. Mendoza.      CHIEF COMPLAINT:  \"I made a suicide attempt.\"      HISTORY OF PRESENT ILLNESS:  The patient has chronic depression.  He has been feeling depressed for a long time.  Last year, he was admitted, had ECT and did not feel like it worked.  On Monday, he  did an overdose by taking 2 bottles of his levothyroxine.  He still feels suicidal.  He wishes the plan was successful, and he still has suicidal ideation.  He does not want to do anything.  He says he feels like he is barely functioning.  He is isolated.  His energy is down.  His motivation is down.  His interest is down.  He is not eating, not sleeping.  He did say that in the past, he had a manic episode, but he was very vague about describing it, saying that at times he will be happy and impulsive.  He does not have any homicidal ideation.  Does not have auditory or visual hallucinations.  He does have anxiety.  He describes that he has panic attacks.  When he gets panic attacks, his heart races.  He has shortness of breath.  His has chest pain.  He has palpitations.      The patient has been sober from everything in 1988, from alcohol and other drugs.  He had a slip on oxycodone.  He got sober but most recently, he has been abusing benzos.  He has been on benzos for 9 years.  He takes 8-9 tablets of Ativan and Ambien.  He takes 12.5 mg, 2-3 tablets.  He uses them and when he runs out of them, he goes through withdrawal.  He has tolerance, withdrawal, progressive use with loss of control.  He tried to quit unsuccessfully.  He used despite negative consequences of work, relationships.  He does not use any other street drugs.      PAST PSYCHIATRIC HISTORY:  Psychiatrically hospitalized once.  He had ECT.  He had overdosed on propranolol. "  He was in 1 chemical dependency treatment.      PAST MEDICAL HISTORY:  A 10-point review of systems was reviewed and significant for having elevated feeling that his heart is racing.        VITAL SIGNS:  Temperature of 98, pulse of 93, respiratory rate of 16, blood pressure of 117/69.        He has diabetes, hypothyroidism, hypertension, and high cholesterol.      FAMILY HISTORY:  Father has alcoholism.  Family psychiatric is unknown.      SOCIAL HISTORY:  Born in [facility].  Apparently, his father was in the .  He has 12 years of education.  He worked in real estate.  He has 3 children.  He is .      MENTAL STATUS EXAMINATION:  The patient is a 64-year-old  male who appears his stated age.  He has adequate grooming, adequate hygiene.  Mood is depressed.  Affect is congruent.  Speech is spontaneous, normal rate.  Linear thought process, no loosening of associations.  Does not have any active suicidal or homicidal ideation.  Does not have auditory hallucinations.  Alert and oriented x3.  He does have passive suicidal ideation.  He made a recent suicide attempt.  Insight and judgment are limited.  Recent and remote memory, language, and fund of knowledge are all adequate.      DIAGNOSES:   Axis I:     1.  Major depressive disorder, recurrent, without psychotic features.     2.  Rule out bipolar affective disorder.     3.  Panic disorder.   4.  Sedative hypnotic use disorder.      PLAN:   1.  The patient will be detoxed off sedativehypnotics  using phenobarbital.  The patient will be seen by case management and Internal Medicine.about his elevated t4  The patient will be continued on his medications including Effexor 150 mg, Victoza 1.8 mg, atorvastatin and aspirin.  The patient will be put on suicide precautions.  The patient will be switched to MICD.  The patient wants to get help.  He is also interested in doing RTMS.         VASU KENDALL MD             D: 03/28/2019   T: 03/28/2019    MT:       Name:     JOHN DELATORRE   MRN:      [id number]        Account:      [id number]   :      1955        Admitted:     2019                   Document: [id number]       cc: Primary Care Physician

## 2019-03-29 LAB
ANION GAP SERPL CALCULATED.3IONS-SCNC: 11 MMOL/L (ref 3–14)
BUN SERPL-MCNC: 22 MG/DL (ref 7–30)
CALCIUM SERPL-MCNC: 9 MG/DL (ref 8.5–10.1)
CHLORIDE SERPL-SCNC: 104 MMOL/L (ref 94–109)
CO2 SERPL-SCNC: 26 MMOL/L (ref 20–32)
CREAT SERPL-MCNC: 0.86 MG/DL (ref 0.66–1.25)
GFR SERPL CREATININE-BSD FRML MDRD: >90 ML/MIN/{1.73_M2}
GLUCOSE BLDC GLUCOMTR-MCNC: 120 MG/DL (ref 70–99)
GLUCOSE BLDC GLUCOMTR-MCNC: 140 MG/DL (ref 70–99)
GLUCOSE SERPL-MCNC: 106 MG/DL (ref 70–99)
POTASSIUM SERPL-SCNC: 3.4 MMOL/L (ref 3.4–5.3)
SODIUM SERPL-SCNC: 141 MMOL/L (ref 133–144)
T3 SERPL-MCNC: 1040 NG/DL (ref 60–181)
T4 FREE SERPL-MCNC: >8 NG/DL (ref 0.76–1.46)
TSH SERPL DL<=0.005 MIU/L-ACNC: 0.04 MU/L (ref 0.4–4)

## 2019-03-29 PROCEDURE — 25000132 ZZH RX MED GY IP 250 OP 250 PS 637: Performed by: PHYSICIAN ASSISTANT

## 2019-03-29 PROCEDURE — 99232 SBSQ HOSP IP/OBS MODERATE 35: CPT | Performed by: NURSE PRACTITIONER

## 2019-03-29 PROCEDURE — 36415 COLL VENOUS BLD VENIPUNCTURE: CPT | Performed by: PHYSICIAN ASSISTANT

## 2019-03-29 PROCEDURE — 25000132 ZZH RX MED GY IP 250 OP 250 PS 637: Performed by: PSYCHIATRY & NEUROLOGY

## 2019-03-29 PROCEDURE — 84439 ASSAY OF FREE THYROXINE: CPT | Performed by: PHYSICIAN ASSISTANT

## 2019-03-29 PROCEDURE — 80048 BASIC METABOLIC PNL TOTAL CA: CPT | Performed by: PHYSICIAN ASSISTANT

## 2019-03-29 PROCEDURE — 12400001 ZZH R&B MH UMMC

## 2019-03-29 PROCEDURE — 25000132 ZZH RX MED GY IP 250 OP 250 PS 637: Performed by: STUDENT IN AN ORGANIZED HEALTH CARE EDUCATION/TRAINING PROGRAM

## 2019-03-29 PROCEDURE — 00000146 ZZHCL STATISTIC GLUCOSE BY METER IP

## 2019-03-29 PROCEDURE — 93005 ELECTROCARDIOGRAM TRACING: CPT

## 2019-03-29 PROCEDURE — 25000132 ZZH RX MED GY IP 250 OP 250 PS 637: Performed by: NURSE PRACTITIONER

## 2019-03-29 PROCEDURE — 84443 ASSAY THYROID STIM HORMONE: CPT | Performed by: PHYSICIAN ASSISTANT

## 2019-03-29 PROCEDURE — 93010 ELECTROCARDIOGRAM REPORT: CPT | Performed by: INTERNAL MEDICINE

## 2019-03-29 PROCEDURE — 25000125 ZZHC RX 250: Performed by: NURSE PRACTITIONER

## 2019-03-29 PROCEDURE — 84480 ASSAY TRIIODOTHYRONINE (T3): CPT | Performed by: PHYSICIAN ASSISTANT

## 2019-03-29 RX ORDER — OLANZAPINE 2.5 MG/1
2.5 TABLET, FILM COATED ORAL 3 TIMES DAILY PRN
Status: DISCONTINUED | OUTPATIENT
Start: 2019-03-29 | End: 2019-04-05 | Stop reason: HOSPADM

## 2019-03-29 RX ORDER — CHOLESTYRAMINE 4 G/9G
1 POWDER, FOR SUSPENSION ORAL EVERY 8 HOURS
Status: DISCONTINUED | OUTPATIENT
Start: 2019-03-29 | End: 2019-04-03

## 2019-03-29 RX ORDER — TRAZODONE HYDROCHLORIDE 100 MG/1
100 TABLET ORAL
Status: DISCONTINUED | OUTPATIENT
Start: 2019-03-29 | End: 2019-04-01

## 2019-03-29 RX ORDER — VENLAFAXINE HYDROCHLORIDE 150 MG/1
150 CAPSULE, EXTENDED RELEASE ORAL
Status: DISCONTINUED | OUTPATIENT
Start: 2019-03-30 | End: 2019-04-05 | Stop reason: HOSPADM

## 2019-03-29 RX ORDER — HYDROXYZINE HYDROCHLORIDE 50 MG/1
50 TABLET, FILM COATED ORAL EVERY 4 HOURS PRN
Status: DISCONTINUED | OUTPATIENT
Start: 2019-03-29 | End: 2019-04-05 | Stop reason: HOSPADM

## 2019-03-29 RX ORDER — RAMELTEON 8 MG/1
8 TABLET ORAL AT BEDTIME
Status: DISCONTINUED | OUTPATIENT
Start: 2019-03-29 | End: 2019-04-05 | Stop reason: HOSPADM

## 2019-03-29 RX ORDER — LOPERAMIDE HCL 2 MG
2 CAPSULE ORAL 4 TIMES DAILY PRN
Status: DISCONTINUED | OUTPATIENT
Start: 2019-03-29 | End: 2019-04-05 | Stop reason: HOSPADM

## 2019-03-29 RX ADMIN — PHENOBARBITAL 64.8 MG: 64.8 TABLET ORAL at 13:30

## 2019-03-29 RX ADMIN — PROPRANOLOL HYDROCHLORIDE 20 MG: 20 TABLET ORAL at 13:30

## 2019-03-29 RX ADMIN — HYDROXYZINE HYDROCHLORIDE 50 MG: 50 TABLET, FILM COATED ORAL at 18:23

## 2019-03-29 RX ADMIN — PROPRANOLOL HYDROCHLORIDE 20 MG: 20 TABLET ORAL at 08:48

## 2019-03-29 RX ADMIN — NICOTINE POLACRILEX 4 MG: 2 LOZENGE ORAL at 22:41

## 2019-03-29 RX ADMIN — NICOTINE POLACRILEX 4 MG: 2 LOZENGE ORAL at 18:23

## 2019-03-29 RX ADMIN — NICOTINE POLACRILEX 4 MG: 2 LOZENGE ORAL at 11:50

## 2019-03-29 RX ADMIN — PHENOBARBITAL 64.8 MG: 64.8 TABLET ORAL at 21:52

## 2019-03-29 RX ADMIN — PROPRANOLOL HYDROCHLORIDE 20 MG: 20 TABLET ORAL at 21:54

## 2019-03-29 RX ADMIN — CHOLESTYRAMINE 4 G: 4 POWDER, FOR SUSPENSION ORAL at 15:28

## 2019-03-29 RX ADMIN — ATORVASTATIN CALCIUM 40 MG: 40 TABLET, FILM COATED ORAL at 18:23

## 2019-03-29 RX ADMIN — LIRAGLUTIDE 1.8 MG: 6 INJECTION SUBCUTANEOUS at 19:40

## 2019-03-29 RX ADMIN — VENLAFAXINE HYDROCHLORIDE 150 MG: 37.5 TABLET ORAL at 08:48

## 2019-03-29 RX ADMIN — NICOTINE POLACRILEX 4 MG: 2 LOZENGE ORAL at 09:56

## 2019-03-29 RX ADMIN — ASPIRIN 81 MG: 81 TABLET, COATED ORAL at 18:24

## 2019-03-29 RX ADMIN — NICOTINE POLACRILEX 4 MG: 2 LOZENGE ORAL at 16:02

## 2019-03-29 RX ADMIN — IBUPROFEN 800 MG: 600 TABLET ORAL at 08:49

## 2019-03-29 RX ADMIN — LOPERAMIDE HYDROCHLORIDE 2 MG: 2 CAPSULE ORAL at 22:41

## 2019-03-29 RX ADMIN — IBUPROFEN 800 MG: 600 TABLET ORAL at 22:41

## 2019-03-29 RX ADMIN — HYDROXYZINE HYDROCHLORIDE 50 MG: 50 TABLET, FILM COATED ORAL at 11:50

## 2019-03-29 RX ADMIN — PHENOBARBITAL 64.8 MG: 64.8 TABLET ORAL at 08:48

## 2019-03-29 RX ADMIN — NICOTINE POLACRILEX 4 MG: 2 LOZENGE ORAL at 13:30

## 2019-03-29 RX ADMIN — CHOLESTYRAMINE 4 G: 4 POWDER, FOR SUSPENSION ORAL at 21:52

## 2019-03-29 RX ADMIN — NICOTINE POLACRILEX 4 MG: 2 LOZENGE ORAL at 08:48

## 2019-03-29 RX ADMIN — HYDROXYZINE HYDROCHLORIDE 25 MG: 25 TABLET ORAL at 09:57

## 2019-03-29 RX ADMIN — METFORMIN HYDROCHLORIDE 1000 MG: 500 TABLET ORAL at 18:23

## 2019-03-29 RX ADMIN — RAMELTEON 8 MG: 8 TABLET, FILM COATED ORAL at 21:54

## 2019-03-29 ASSESSMENT — ACTIVITIES OF DAILY LIVING (ADL)
HYGIENE/GROOMING: HANDWASHING;SHOWER;INDEPENDENT
HYGIENE/GROOMING: INDEPENDENT
ORAL_HYGIENE: INDEPENDENT
DRESS: SCRUBS (BEHAVIORAL HEALTH);INDEPENDENT
ORAL_HYGIENE: INDEPENDENT

## 2019-03-29 NOTE — PROGRESS NOTES
2015, pt was transferred to sta 32 from 3a d/t his consistent thoughts of suicide. Here on 32 he has contracted for safety. He has been pleasant and cooperative and will also be assessed for benzo WD which sta 3a did prior to his arrival here.

## 2019-03-29 NOTE — PROGRESS NOTES
Patient spent part of the day napping/resting in bed.  Met with doctor and others during the day.  Attended no groups today.  Denies any SI or thoughts of self-harm at this time.  Rates both depression and anxiety at 8 out of 10 scale.   Otherwise, patient is polite, pleasant and cooperative.

## 2019-03-29 NOTE — PROGRESS NOTES
BEHAVIORAL TEAM DISCUSSION    Participants: Mikaela Rankin, JAKE; Laura Joyce RN  Progress: Pt transferring to station 32 for continued mental health evaluation and treatment of depression and SI.  He is a 64 year old male.      Continued Stay Criteria/Rationale: pt is in need of stabilization  Medical/Physical: see chart  Precautions:   Behavioral Orders   Procedures     Code 1 - Restrict to Unit     Routine Programming     As clinically indicated     Status 15     Every 15 minutes.     Suicide precautions     Patients on Suicide Precautions should have a Combination Diet ordered that includes a Diet selection(s) AND a Behavioral Tray selection for Safe Tray - with utensils, or Safe Tray - NO utensils       Withdrawal precautions     Plan: pt expresses interest in chemical health treatment after stabilization.  This will be pursued.  Pt will continue to be monitored and assessed with stabilization as the primary goal.   Rationale for change in precautions or plan: pt was transferred from 3a to mental health unit due to ongoing thoughts of suicide.

## 2019-03-29 NOTE — PROGRESS NOTES
Pt transferring to station 32 for continued mental health evaluation and treatment of depression and SI.  Patient is interested in going to treatment after his hospitalization.  He currently reports that he has ongoing thoughts of suicide but is able to contract for safety.  He has been abusing benzodiazepines for long enough that he has become addicted to them.  He continues on phenobarbital for benzodiazepine withdrawal and evaluated by [facility].  Pt is alert and oriented X 3, he is eating his meals, he independently manages his diabetes at home.  He take metformin and an injectable, Victoza for effective blood glucose management.

## 2019-03-29 NOTE — CONSULTS
Endocrinology Medical Student consult note    Chief complaint:  Tanner is a 64 year old male seen in consultation at the request of Dr. Mchugh for levothyroxine overdose.      HISTORY OF PRESENT ILLNESS  Tanner is a 64 year old male with a history of hypothyroidism, DM2, HTN, HLD, MDD, and MI s/p PCI in 2010 admitted 3/27/19 following suicide attempt by overdose of levothyroxine and Avapro on 3/25/19.     Tanner states that he took 2 bottles of Avapro and 1 bottle of 75 mcg Levothyroxine and 1 newer bottle of 137.5 mcg levothyroxine at about 5:30pm on 3/25 with intent to kill himself. He thinks in total he took about 70-80 pills of levothyroxine. He felt dizzy and shaky with sweats, diarrhea, and palpitations overnight that night and into the following day. He then told his family what happened on 3/26 in the afternoon, and was brought to the ED. On admission 3/27, he was noted to be tachycardic, flushed, and tremulous. Poison control was contacted, advised that his symptoms could potentially worsen over the next few days, and expect that they will begin to resolve in 10 days. They recommended benzodiazepines to treat symptoms, although these are contraindicated given his history of Ativan abuse (last use 2/22). He is instead being managed symptomatically with phenobarbital and propranolol 20 mg TID, holding for SBP <100 or HR <50. During admission, TSH has been markedly decreased <0.04, and T4 has been >8. Prior to this, TSH last checked in our system in 4/2018 and was within normal limits at that time.     Tanner reports he has been using Ativan for ~9 years and experiences monthly withdrawals but no history of seizures. He has been on withdrawal precautions and MSSA scores have been 8 and 7, although clinical assessment is likely complicated by physiological effects of levothyroxine overdose.     Today, Tanner states he continues to feel shaky and sweaty, with palpitations occurring about twice daily. Continues to  have diarrhea. Regarding his mood, he reports he feels depressed, scared, and anxious. He has felt this way for at least the past year, and was hospitalized about 1 year ago, during which time he received 5 ECT treatments that did not improve his depression. He has been on Effexor over the past year, and was also taking Prozac over the past 2 months. 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     Of note, Tanner does have a history of type 2 diabetes managed with victoza 1.8 mg daily and metformin 1000 mg BID. Most recent A1c was 9.9 in 4/2018.       REVIEW OF SYSTEMS    10 system ROS otherwise as per the HPI or negative    Past Medical History  Past Medical History:   Diagnosis Date     Anxiety      Coronary artery disease     stent x 2     Diabetes mellitus (H)     type 2     Headaches      Heart attack (H)      Hyperlipemia      Hypertension      Insomnia, unspecified      Sleep apnea        Medications  Current Facility-Administered Medications   Medication     alum & mag hydroxide-simethicone (MYLANTA ES/MAALOX  ES) suspension 30 mL     aspirin EC tablet 81 mg     atorvastatin (LIPITOR) tablet 40 mg     bisacodyl (DULCOLAX) Suppository 10 mg     glucose gel 15-30 g    Or     dextrose 50 % injection 25-50 mL    Or     glucagon injection 1 mg     hydrOXYzine (ATARAX) tablet 25 mg     ibuprofen (ADVIL/MOTRIN) tablet 800 mg     lactated ringers infusion     liraglutide (VICTOZA) injection 1.8 mg     magnesium hydroxide (MILK OF MAGNESIA) suspension 30 mL     metFORMIN (GLUCOPHAGE) tablet 1,000 mg     nicotine (COMMIT) lozenge 2-4 mg     OLANZapine zydis (zyPREXA) ODT tab 5 mg     PHENobarbital (LUMINAL) tablet 64.8 mg     propranolol (INDERAL) tablet 20 mg     traZODone (DESYREL) tablet 100 mg     venlafaxine (EFFEXOR) tablet 150 mg       No current outpatient medications on file.       Allergies  No Known Allergies      Family History  family  "history includes Diabetes Type 1 in his father.  Thyroid problem in mother  Uncle committed suicide  Depression in father    Social History  Social History     Tobacco Use     Smoking status: Former Smoker     Last attempt to quit: 1/15/2010     Years since quittin.2     Smokeless tobacco: Never Used   Substance Use Topics     Alcohol use: No     Drug use: No       Physical Exam  /65   Pulse 81   Temp 97.6  F (36.4  C) (Tympanic)   Resp 16   Ht 1.778 m (5' 10\")   Wt 95.3 kg (210 lb)   SpO2 95%   BMI 30.13 kg/m    Body mass index is 30.13 kg/m .  GENERAL:  In no apparent distress  SKIN: Normal color, normal temperature, no rashes or lesions on exposed skin   EYES: PERRL, EOMI, No scleral icterus,  No proptosis, conjunctival redness, stare, retraction  MOUTH: dry mucous membranes, pink; pharynx clear  NECK: No visible masses   RESP: Lungs clear to auscultation bilaterally  CARDIAC: Regular rate and rhythm, normal S1 S2, without murmurs, rubs or gallops    NEURO: awake, alert, oriented, responds appropriately to questions.  Cranial nerves II-XII grossly intact. Moves all extremities; Gait normal.  Slight tremor of the outstretched hand. Biceps reflexes 2+ and symmetric   EXTREMITIES: No clubbing, cyanosis or edema  PSYCH: affect somewhat blunted, states he feels anxious and depressed    DATA REVIEW  Thyroid studies-  TSH   Date Value Ref Range Status   2019 0.04 (L) 0.40 - 4.00 mU/L Final   2019 0.04 (L) 0.40 - 4.00 mU/L Final   2019 0.11 (L) 0.40 - 4.00 mU/L Final   2018 3.31 0.40 - 4.00 mU/L Final     T4 Free   Date Value Ref Range Status   2019 >8.00 (H) 0.76 - 1.46 ng/dL Final   2019 >8.00 (H) 0.76 - 1.46 ng/dL Final   2019 >8.00 (H) 0.76 - 1.46 ng/dL Final     T3 (3/29/19): >460 (H)    Liver Function Studies -   Recent Labs   Lab Test 19  1531   PROTTOTAL 7.7   ALBUMIN 4.2   BILITOTAL 0.9   ALKPHOS 71   AST 33   ALT 46       CBC 3/27/19 within normal " limits    BMP:  Sodium   Date Value Ref Range Status   03/29/2019 141 133 - 144 mmol/L Final     Potassium   Date Value Ref Range Status   03/29/2019 3.4 3.4 - 5.3 mmol/L Final     Chloride   Date Value Ref Range Status   03/29/2019 104 94 - 109 mmol/L Final     Carbon Dioxide   Date Value Ref Range Status   03/29/2019 26 20 - 32 mmol/L Final     Anion Gap   Date Value Ref Range Status   03/29/2019 11 3 - 14 mmol/L Final     Glucose   Date Value Ref Range Status   03/29/2019 106 (H) 70 - 99 mg/dL Final     Urea Nitrogen   Date Value Ref Range Status   03/29/2019 22 7 - 30 mg/dL Final     Creatinine   Date Value Ref Range Status   03/29/2019 0.86 0.66 - 1.25 mg/dL Final     GFR Estimate   Date Value Ref Range Status   03/29/2019 >90 >60 mL/min/[1.73_m2] Final     Comment:     Non  GFR Calc  Starting 12/18/2018, serum creatinine based estimated GFR (eGFR) will be   calculated using the Chronic Kidney Disease Epidemiology Collaboration   (CKD-EPI) equation.       Calcium   Date Value Ref Range Status   03/29/2019 9.0 8.5 - 10.1 mg/dL Final     Troponin I ES   Date Value Ref Range Status   03/27/2019 <0.015 0.000 - 0.045 ug/L Final     Comment:     The 99th percentile for upper reference range is 0.045 ug/L.  Troponin values   in the range of 0.045 - 0.120 ug/L may be associated with risks of adverse   clinical events.       EtOH, acetaminophen, and salicylate levels within normal limits    EKGs  3/27/19: sinus tachycardia, old inferior MI  3/29/19: sinus rhythm with PAC, old inferior MI     ASSESSMENT/PLAN:   Tanner Villatoro is a 64 year-old man with a history of hypothyroidism, depression, DM2, and MI s/p PCI in 2010 seen for management following levothyroxine overdose on 3/25/19.     # Levothyroxine overdose: Low suspicion for thyroid storm given normal ECGs and LFTs wnl, and patient is mentating at baseline. He is somewhat tremulous on exam and continues to report shakiness and sweatiness. As he does  have a history of MI, he is at overall increased risk for adverse cardiac effects, although he has been vitally stable and no longer persistently tachycardic. Per poison control and review of the literature, symptoms may worsen initially over the next few days and may persist for 10 days. Given his history of benzo abuse, they recommended symptomatic management with phenobarbital, and he was also begun on propranolol 20 mg TID yesterday. Given his sustained and marked elevations in  FT4,  we recommend cholestyramine to help increase elimination of thyroid hormone. Blocking the conversion of T4 to T3 would likely be a less helpful strategy at this time given the large dose of levothyroxine that he took.   - Continue to trend T3 and T4 daily   -- Discussed with lab--they are working to see if they can dilute previously collected specimens for free T4 and T3 to get more accurate measurements   - Continue propranolol 20 mg TID (will consider increasing if symptoms worsen)  - Start cholestyramine 4g q8h      Patient seen and discussed with attending physician, Dr. Parr and fellow Dr. Bosch.    Shantal Martinez, MS3  Endocrinology Service     --- Addendum----  I saw the patient with MS3 Juan, endocrine fellow Dr. Bosch and directly examined patient and discussed. Agree above note and plan.   Stable vitals, still high free T4, start cholestyramine.       Kate Parr MD  Staff Physician  Endocrinology and Metabolism  [facility]  License: [id number]  Pager: [phone]

## 2019-03-29 NOTE — PROGRESS NOTES
"  INITIAL PSYCHOSOCIAL ASSESSMENT AND NOTE  I have reviewed the chart met with the patient, and developed Care Plan.  Information for assessment was obtained from: review of chart and interview with pt.   PRESENTING PROBLEM: Pt transferring to station 32 for continued mental health evaluation and treatment of depression and SI.  Patient is interested in going to treatment after his hospitalization.  He currently reports that he has ongoing thoughts of suicide but is able to contract for safety.  He has been abusing benzodiazepines with subsequent addiction.  Avapro overdose: Ingested approximately 135 of his 150 mg tablets on the afternoon of 3/25 in suicide attempt  The following areas have been assessed:  History of Mental Health and Chemical Dependency:   The patient has chronic depression.  He has been feeling depressed for a long time.  Last year, he was admitted, had ECT.  Pt was admitted to 3a on 3/27/19: \"Pt is a 64 year old male with a history of depression/anxiety, substance abuse, DM type II, hypothyroidism, HTN, HLD, and hx of MI in 2010 s/p PCI, who was admitted on 3/27/19 to station 3A from the ED seeking detox from benzodiazepines and Ambien, as well as suicide attempt via Avapro and Synthroid ingestion\".  Abusing Ativan and Ambien for last 10-12 months. Reports monthly benzodiazepine withdrawals.  He has been taking benzodiazepines for 9 years since his MI in 2010 and has been abusing them for the past year. He has been taking 4-8 mg of lorazepam per day, last used on 3/22. He also has been abusing his Ambien over the last several months, which he also has been on for years. He has been taking 3 pills per day, last used on 3/22. He reports going into benzodiazepine withdrawal once monthly for the past 10 months. Denies any history of withdrawal seizures or other withdrawal complications. Denies alcohol use or other illicit drug use.   States he went to CD treatment once many years ago. Hx of one " psychiatric admission. Hx of one CD treatment episode of care  Living Situation: he lives with his 21 year old son.  Significant Life Events (Illness, Abuse, Trauma, Death): pt is .    Family Description (Constellation, Family Psychiatric History): He is  and has 3 children. Father has alcoholism.   Born in [facility].  Apparently, his father was in the   Financial Status: pt has active insurance.  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   Educational Background: 12 years of formal education.     Service History: none  Legal Issues: denies  Ethnic/Cultural Issues: , English speaking.  Spiritual Orientation: none  Social Functioning (organizations, interests): pt likes to play golf and be active.  He has not been able to enjoy these things for the past two years.   Current Treatment providers:   psychiatrist Javier Mendoza.   Social Service Assessment/Plan:   Pt will receive regular psychiatric services while on the unit.  He will be encouraged to attend unit programming.  CTC to arrange clinically appropriate discharge plan.  Pt is  interested in CD treatment.  Will arrange assessment with [facility].

## 2019-03-29 NOTE — PROGRESS NOTES
[facility], [place]   Psychiatric Progress Note      Impression:     Tanner Villatoro is a 64-year-old male admitted to [facility] Station 3A detox on 3/27/2019 due to anxiety and depressive symptoms and following a suicide attempt by overdose on Synthroid on 3/25/2019.  He had been using benzos for 9 years and had been taking 8-9 tablets of Ativan 1 mg and 2-3 tablets of Ambien CR 12.5 mg daily.  Last use of Ativan was 3/22/2019.  He was transferred to Station 32N.  He is on the [facility] for scoring.  A Phenobarbital taper was initiated.  Effexor was continued.  Rozerem was initiated  Propranolol was initiated temporarily following his Synthroid overdose.  PRNs of Trazodone, Zyprexa and Vistaril are available.  Mood is depressed and anxious.  He denies suicidal ideation.           Diagnoses:     Major depressive disorder, severe, recurrent, without psychotic features  Panic disorder  Sedative hypnotic and anxiolytic use disorder  History of opiate use disorder  History of alcohol use disorder  Type 2 diabetes  Hypothyroidism  Hyperlipidemia  Coronary artery disease, history of MI in 2010  History of hypertension  Back pain  Synthroid and Avapro overdose 3/25/2019.           Plan:     Medications:  Continue Phenobarbital taper.  Continue Propranolol (temporary following Synthroid overdose).  Change Effexor to XR, his PTA formulation.  Change Trazodone from scheduled to PRN.  Add Rozerem 8 mg at HS.  Increase PRN Vistaril to 50 mg.  Reduce PRN Zyprexa to 2.5mg.      Internal medicine continuing to follow status post Synthroid overdose on 3/25/2019.      Plan for CD evaluation and discharge to CD treatment.  He has an outpatient psychiatrist and PCP, but not a therapist.       Clinical Global Impressions  First:  Considering your total clinical experience with this particular patient population, how severe are the patient's symptoms at this time?: 7 (03/28/19 0993)  Compared to the patient's  condition at the START of treatment, this patient's condition is:: 1 (03/28/19 0904)  Most recent:  Considering your total clinical experience with this particular patient population, how severe are the patient's symptoms at this time?: 7 (03/28/19 0904)  Compared to the patient's condition at the START of treatment, this patient's condition is:: 1 (03/28/19 0904)    Attestation:  Patient has been seen and evaluated by me, ONEYDA Smith CNP  The patient was counseled on nature of illness and treatment plan/options  Care was coordinated with treatment team          Interim History:     The patient's care was discussed with the treatment team and chart notes were reviewed.  Pt was documented as sleeping 7.5 hours during the overnight shift.  MSSA scores have been between 4 and 8.  He has been using PRN Vistaril which has been beneficial but insufficient.  PRN Zyprexa caused him to feel drowsy but was beneficial.  Discussed adjusting doses.   He was prescribed Paxil following his MI in 2010 and it was beneficial until December 2017.  Since then he has tried Seroquel, Rexulti, Abilify, Trintellix, Remeron, Prozac, Clonidine, Neurontin and Propranolol, with side effects and/or no benefit.  Pt said that he underwent a course of 5 ECTs a year ago, and it was not beneficial and caused memory impairment.  States that he was sober from alcohol for many years, but did consume 3 beers last year.  He has chronic back pain and abused opiate pills with last use in December 2018.  States he went to CD treatment once many years ago.  He would like a CD evaluation and transfer directly to CD treatment.  Pt's mood is anxious and depressed.  He denies suicidal thoughts.  States he has been sleeping poorly.  Complains of diarrhea and fecal incontinence, likely related to Synthroid overdose.           Medications:     Current Facility-Administered Medications   Medication     alum & mag hydroxide-simethicone (MYLANTA  "ES/MAALOX  ES) suspension 30 mL     aspirin EC tablet 81 mg     atorvastatin (LIPITOR) tablet 40 mg     bisacodyl (DULCOLAX) Suppository 10 mg     glucose gel 15-30 g    Or     dextrose 50 % injection 25-50 mL    Or     glucagon injection 1 mg     hydrOXYzine (ATARAX) tablet 50 mg     ibuprofen (ADVIL/MOTRIN) tablet 800 mg     liraglutide (VICTOZA) injection 1.8 mg     magnesium hydroxide (MILK OF MAGNESIA) suspension 30 mL     metFORMIN (GLUCOPHAGE) tablet 1,000 mg     nicotine (COMMIT) lozenge 2-4 mg     OLANZapine (zyPREXA) tablet 2.5 mg     PHENobarbital (LUMINAL) tablet 64.8 mg     propranolol (INDERAL) tablet 20 mg     ramelteon (ROZEREM) tablet 8 mg     traZODone (DESYREL) tablet 100 mg     [START ON 3/30/2019] venlafaxine (EFFEXOR-XR) 24 hr capsule 150 mg             Allergies:   No Known Allergies         Psychiatric Examination:   /65   Pulse 81   Temp 98.2  F (36.8  C) (Oral)   Resp 16   Ht 1.778 m (5' 10\")   Wt 95.3 kg (210 lb)   SpO2 95%   BMI 30.13 kg/m    Weight is 210 lbs 0 oz  Body mass index is 30.13 kg/m .    Appearance:  awake, alert, adequately groomed and dressed in hospital scrubs  Attitude:  cooperative  Eye Contact:  good  Mood:  anxious and depressed  Affect:  appropriate and in normal range and mood congruent  Speech:  clear, coherent  Psychomotor Behavior:  no evidence of tardive dyskinesia, dystonia, or tics  Thought Process:  logical, linear and goal oriented  Associations:  no loose associations  Thought Content:  no evidence of suicidal ideation or homicidal ideation and no evidence of psychotic thought  Insight:  good  Judgment:  intact  Oriented to:  date, time, person, and place  Attention Span and Concentration:  fair to good  Recent and Remote Memory:  fair to good  Language:  intact  Fund of Knowledge:  appropriate  Muscle Strength and Tone:  normal  Gait and Station:  normal         Labs:     Recent Results (from the past 24 hour(s))   Basic metabolic panel    " Collection Time: 03/28/19 12:11 PM   Result Value Ref Range    Sodium 139 133 - 144 mmol/L    Potassium 3.6 3.4 - 5.3 mmol/L    Chloride 104 94 - 109 mmol/L    Carbon Dioxide 24 20 - 32 mmol/L    Anion Gap 11 3 - 14 mmol/L    Glucose 112 (H) 70 - 99 mg/dL    Urea Nitrogen 16 7 - 30 mg/dL    Creatinine 0.83 0.66 - 1.25 mg/dL    GFR Estimate >90 >60 mL/min/[1.73_m2]    GFR Estimate If Black >90 >60 mL/min/[1.73_m2]    Calcium 9.3 8.5 - 10.1 mg/dL   TSH    Collection Time: 03/28/19 12:11 PM   Result Value Ref Range    TSH 0.04 (L) 0.40 - 4.00 mU/L   T4 free    Collection Time: 03/28/19 12:11 PM   Result Value Ref Range    T4 Free >8.00 (H) 0.76 - 1.46 ng/dL   Glucose by meter    Collection Time: 03/28/19  4:22 PM   Result Value Ref Range    Glucose 126 (H) 70 - 99 mg/dL   TSH    Collection Time: 03/29/19  7:28 AM   Result Value Ref Range    TSH 0.04 (L) 0.40 - 4.00 mU/L   T4 free    Collection Time: 03/29/19  7:28 AM   Result Value Ref Range    T4 Free >8.00 (H) 0.76 - 1.46 ng/dL   Basic metabolic panel    Collection Time: 03/29/19  7:28 AM   Result Value Ref Range    Sodium 141 133 - 144 mmol/L    Potassium 3.4 3.4 - 5.3 mmol/L    Chloride 104 94 - 109 mmol/L    Carbon Dioxide 26 20 - 32 mmol/L    Anion Gap 11 3 - 14 mmol/L    Glucose 106 (H) 70 - 99 mg/dL    Urea Nitrogen 22 7 - 30 mg/dL    Creatinine 0.86 0.66 - 1.25 mg/dL    GFR Estimate >90 >60 mL/min/[1.73_m2]    GFR Estimate If Black >90 >60 mL/min/[1.73_m2]    Calcium 9.0 8.5 - 10.1 mg/dL   Glucose by meter    Collection Time: 03/29/19  8:45 AM   Result Value Ref Range    Glucose 120 (H) 70 - 99 mg/dL   EKG 12-lead, complete    Collection Time: 03/29/19  9:05 AM   Result Value Ref Range    Interpretation ECG Click View Image link to view waveform and result

## 2019-03-29 NOTE — PROGRESS NOTES
Placed a call to [facility] to request CD eval.  LVHANNAH requesting call back.      Monday April 1 at 10:30am pt will be seen by an .

## 2019-03-30 LAB
ANION GAP SERPL CALCULATED.3IONS-SCNC: 8 MMOL/L (ref 3–14)
BUN SERPL-MCNC: 26 MG/DL (ref 7–30)
CALCIUM SERPL-MCNC: 8.4 MG/DL (ref 8.5–10.1)
CHLORIDE SERPL-SCNC: 107 MMOL/L (ref 94–109)
CO2 SERPL-SCNC: 27 MMOL/L (ref 20–32)
CREAT SERPL-MCNC: 0.7 MG/DL (ref 0.66–1.25)
GFR SERPL CREATININE-BSD FRML MDRD: >90 ML/MIN/{1.73_M2}
GLUCOSE BLDC GLUCOMTR-MCNC: 108 MG/DL (ref 70–99)
GLUCOSE BLDC GLUCOMTR-MCNC: 146 MG/DL (ref 70–99)
GLUCOSE SERPL-MCNC: 118 MG/DL (ref 70–99)
POTASSIUM SERPL-SCNC: 3.4 MMOL/L (ref 3.4–5.3)
SODIUM SERPL-SCNC: 142 MMOL/L (ref 133–144)
T3 SERPL-MCNC: 452 NG/DL (ref 60–181)
T4 FREE SERPL-MCNC: 6.35 NG/DL (ref 0.76–1.46)
TSH SERPL DL<=0.005 MIU/L-ACNC: 0.02 MU/L (ref 0.4–4)

## 2019-03-30 PROCEDURE — 80048 BASIC METABOLIC PNL TOTAL CA: CPT | Performed by: STUDENT IN AN ORGANIZED HEALTH CARE EDUCATION/TRAINING PROGRAM

## 2019-03-30 PROCEDURE — 84443 ASSAY THYROID STIM HORMONE: CPT | Performed by: STUDENT IN AN ORGANIZED HEALTH CARE EDUCATION/TRAINING PROGRAM

## 2019-03-30 PROCEDURE — 84480 ASSAY TRIIODOTHYRONINE (T3): CPT | Performed by: STUDENT IN AN ORGANIZED HEALTH CARE EDUCATION/TRAINING PROGRAM

## 2019-03-30 PROCEDURE — 25000132 ZZH RX MED GY IP 250 OP 250 PS 637: Performed by: PSYCHIATRY & NEUROLOGY

## 2019-03-30 PROCEDURE — 93010 ELECTROCARDIOGRAM REPORT: CPT | Performed by: INTERNAL MEDICINE

## 2019-03-30 PROCEDURE — 84439 ASSAY OF FREE THYROXINE: CPT | Performed by: STUDENT IN AN ORGANIZED HEALTH CARE EDUCATION/TRAINING PROGRAM

## 2019-03-30 PROCEDURE — 12400001 ZZH R&B MH UMMC

## 2019-03-30 PROCEDURE — 25000132 ZZH RX MED GY IP 250 OP 250 PS 637: Performed by: NURSE PRACTITIONER

## 2019-03-30 PROCEDURE — 25000132 ZZH RX MED GY IP 250 OP 250 PS 637: Performed by: PHYSICIAN ASSISTANT

## 2019-03-30 PROCEDURE — 25000132 ZZH RX MED GY IP 250 OP 250 PS 637: Performed by: STUDENT IN AN ORGANIZED HEALTH CARE EDUCATION/TRAINING PROGRAM

## 2019-03-30 PROCEDURE — 93005 ELECTROCARDIOGRAM TRACING: CPT

## 2019-03-30 PROCEDURE — 36415 COLL VENOUS BLD VENIPUNCTURE: CPT | Performed by: STUDENT IN AN ORGANIZED HEALTH CARE EDUCATION/TRAINING PROGRAM

## 2019-03-30 PROCEDURE — 00000146 ZZHCL STATISTIC GLUCOSE BY METER IP

## 2019-03-30 RX ADMIN — NICOTINE POLACRILEX 4 MG: 2 LOZENGE ORAL at 16:40

## 2019-03-30 RX ADMIN — ATORVASTATIN CALCIUM 40 MG: 40 TABLET, FILM COATED ORAL at 18:08

## 2019-03-30 RX ADMIN — NICOTINE POLACRILEX 4 MG: 2 LOZENGE ORAL at 21:31

## 2019-03-30 RX ADMIN — PHENOBARBITAL 64.8 MG: 64.8 TABLET ORAL at 21:29

## 2019-03-30 RX ADMIN — NICOTINE POLACRILEX 4 MG: 2 LOZENGE ORAL at 10:39

## 2019-03-30 RX ADMIN — PHENOBARBITAL 64.8 MG: 64.8 TABLET ORAL at 16:38

## 2019-03-30 RX ADMIN — LOPERAMIDE HYDROCHLORIDE 2 MG: 2 CAPSULE ORAL at 21:31

## 2019-03-30 RX ADMIN — METFORMIN HYDROCHLORIDE 1000 MG: 500 TABLET ORAL at 10:32

## 2019-03-30 RX ADMIN — PHENOBARBITAL 64.8 MG: 64.8 TABLET ORAL at 10:32

## 2019-03-30 RX ADMIN — CHOLESTYRAMINE 4 G: 4 POWDER, FOR SUSPENSION ORAL at 22:48

## 2019-03-30 RX ADMIN — VENLAFAXINE HYDROCHLORIDE 150 MG: 150 CAPSULE, EXTENDED RELEASE ORAL at 10:32

## 2019-03-30 RX ADMIN — PROPRANOLOL HYDROCHLORIDE 20 MG: 20 TABLET ORAL at 10:32

## 2019-03-30 RX ADMIN — ASPIRIN 81 MG: 81 TABLET, COATED ORAL at 18:08

## 2019-03-30 RX ADMIN — METFORMIN HYDROCHLORIDE 1000 MG: 500 TABLET ORAL at 18:08

## 2019-03-30 RX ADMIN — NICOTINE POLACRILEX 4 MG: 2 LOZENGE ORAL at 19:32

## 2019-03-30 RX ADMIN — IBUPROFEN 800 MG: 600 TABLET ORAL at 16:40

## 2019-03-30 RX ADMIN — CHOLESTYRAMINE 4 G: 4 POWDER, FOR SUSPENSION ORAL at 16:38

## 2019-03-30 RX ADMIN — CHOLESTYRAMINE 4 G: 4 POWDER, FOR SUSPENSION ORAL at 10:32

## 2019-03-30 RX ADMIN — RAMELTEON 8 MG: 8 TABLET, FILM COATED ORAL at 21:29

## 2019-03-30 RX ADMIN — LIRAGLUTIDE 1.8 MG: 6 INJECTION SUBCUTANEOUS at 18:08

## 2019-03-30 ASSESSMENT — ACTIVITIES OF DAILY LIVING (ADL)
ORAL_HYGIENE: INDEPENDENT
DRESS: SCRUBS (BEHAVIORAL HEALTH);INDEPENDENT
LAUNDRY: WITH SUPERVISION
HYGIENE/GROOMING: HANDWASHING;INDEPENDENT
HYGIENE/GROOMING: INDEPENDENT
DRESS: INDEPENDENT
ORAL_HYGIENE: INDEPENDENT

## 2019-03-30 NOTE — PROGRESS NOTES
Brief Medicine Note    Following chart daily for Synthroid overdose. Endocrinology also on board.   FT4 down trending to 6.35 today, total T3 still in process. Is on cholesytramine and propranolol (the latter of which has helped w/ symptoms). HR 70s.   EKG today w/ MI interval increased from 0.16 to 0.21 c/w 1st deg AV block. Suspect that this is d/t beta blocker use.   Will continue current management for now and f/u again tomorrow.   Discussed w/ medicine attending Dr. Duggan.     Yoli Sanford PA-C  Hospitalist Service  Pager [phone]

## 2019-03-31 LAB
GLUCOSE BLDC GLUCOMTR-MCNC: 103 MG/DL (ref 70–99)
GLUCOSE BLDC GLUCOMTR-MCNC: 98 MG/DL (ref 70–99)
T3 SERPL-MCNC: 390 NG/DL (ref 60–181)
T4 FREE SERPL-MCNC: 4.69 NG/DL (ref 0.76–1.46)

## 2019-03-31 PROCEDURE — 25000132 ZZH RX MED GY IP 250 OP 250 PS 637: Performed by: PSYCHIATRY & NEUROLOGY

## 2019-03-31 PROCEDURE — 00000146 ZZHCL STATISTIC GLUCOSE BY METER IP

## 2019-03-31 PROCEDURE — 36415 COLL VENOUS BLD VENIPUNCTURE: CPT | Performed by: STUDENT IN AN ORGANIZED HEALTH CARE EDUCATION/TRAINING PROGRAM

## 2019-03-31 PROCEDURE — 93010 ELECTROCARDIOGRAM REPORT: CPT | Performed by: INTERNAL MEDICINE

## 2019-03-31 PROCEDURE — 25000132 ZZH RX MED GY IP 250 OP 250 PS 637: Performed by: NURSE PRACTITIONER

## 2019-03-31 PROCEDURE — 25000132 ZZH RX MED GY IP 250 OP 250 PS 637: Performed by: PHYSICIAN ASSISTANT

## 2019-03-31 PROCEDURE — 84480 ASSAY TRIIODOTHYRONINE (T3): CPT | Performed by: STUDENT IN AN ORGANIZED HEALTH CARE EDUCATION/TRAINING PROGRAM

## 2019-03-31 PROCEDURE — 99231 SBSQ HOSP IP/OBS SF/LOW 25: CPT | Performed by: PHYSICIAN ASSISTANT

## 2019-03-31 PROCEDURE — 93005 ELECTROCARDIOGRAM TRACING: CPT

## 2019-03-31 PROCEDURE — 12400001 ZZH R&B MH UMMC

## 2019-03-31 PROCEDURE — 84439 ASSAY OF FREE THYROXINE: CPT | Performed by: STUDENT IN AN ORGANIZED HEALTH CARE EDUCATION/TRAINING PROGRAM

## 2019-03-31 PROCEDURE — 25000132 ZZH RX MED GY IP 250 OP 250 PS 637: Performed by: STUDENT IN AN ORGANIZED HEALTH CARE EDUCATION/TRAINING PROGRAM

## 2019-03-31 RX ORDER — PROPRANOLOL HYDROCHLORIDE 20 MG/1
20 TABLET ORAL 2 TIMES DAILY
Status: DISCONTINUED | OUTPATIENT
Start: 2019-03-31 | End: 2019-04-01

## 2019-03-31 RX ORDER — IRBESARTAN 150 MG/1
150 TABLET ORAL DAILY
Status: DISCONTINUED | OUTPATIENT
Start: 2019-03-31 | End: 2019-04-05 | Stop reason: HOSPADM

## 2019-03-31 RX ADMIN — NICOTINE POLACRILEX 4 MG: 2 LOZENGE ORAL at 10:55

## 2019-03-31 RX ADMIN — NICOTINE POLACRILEX 4 MG: 2 LOZENGE ORAL at 14:27

## 2019-03-31 RX ADMIN — METFORMIN HYDROCHLORIDE 1000 MG: 500 TABLET ORAL at 09:07

## 2019-03-31 RX ADMIN — METFORMIN HYDROCHLORIDE 1000 MG: 500 TABLET ORAL at 17:58

## 2019-03-31 RX ADMIN — PHENOBARBITAL 64.8 MG: 64.8 TABLET ORAL at 14:24

## 2019-03-31 RX ADMIN — VENLAFAXINE HYDROCHLORIDE 150 MG: 150 CAPSULE, EXTENDED RELEASE ORAL at 09:08

## 2019-03-31 RX ADMIN — NICOTINE POLACRILEX 4 MG: 2 LOZENGE ORAL at 17:58

## 2019-03-31 RX ADMIN — CHOLESTYRAMINE 4 G: 4 POWDER, FOR SUSPENSION ORAL at 06:49

## 2019-03-31 RX ADMIN — CHOLESTYRAMINE 4 G: 4 POWDER, FOR SUSPENSION ORAL at 23:32

## 2019-03-31 RX ADMIN — ALUMINUM HYDROXIDE, MAGNESIUM HYDROXIDE, AND DIMETHICONE 30 ML: 400; 400; 40 SUSPENSION ORAL at 14:46

## 2019-03-31 RX ADMIN — NICOTINE POLACRILEX 4 MG: 2 LOZENGE ORAL at 20:24

## 2019-03-31 RX ADMIN — IBUPROFEN 800 MG: 600 TABLET ORAL at 10:55

## 2019-03-31 RX ADMIN — PROPRANOLOL HYDROCHLORIDE 20 MG: 20 TABLET ORAL at 09:08

## 2019-03-31 RX ADMIN — NICOTINE POLACRILEX 4 MG: 2 LOZENGE ORAL at 19:04

## 2019-03-31 RX ADMIN — NICOTINE POLACRILEX 4 MG: 2 LOZENGE ORAL at 21:52

## 2019-03-31 RX ADMIN — PROPRANOLOL HYDROCHLORIDE 20 MG: 20 TABLET ORAL at 21:52

## 2019-03-31 RX ADMIN — ATORVASTATIN CALCIUM 40 MG: 40 TABLET, FILM COATED ORAL at 17:58

## 2019-03-31 RX ADMIN — IBUPROFEN 800 MG: 600 TABLET ORAL at 20:24

## 2019-03-31 RX ADMIN — RAMELTEON 8 MG: 8 TABLET, FILM COATED ORAL at 21:52

## 2019-03-31 RX ADMIN — PHENOBARBITAL 64.8 MG: 64.8 TABLET ORAL at 21:52

## 2019-03-31 RX ADMIN — LIRAGLUTIDE 1.8 MG: 6 INJECTION SUBCUTANEOUS at 18:39

## 2019-03-31 RX ADMIN — PHENOBARBITAL 64.8 MG: 64.8 TABLET ORAL at 09:07

## 2019-03-31 RX ADMIN — CHOLESTYRAMINE 4 G: 4 POWDER, FOR SUSPENSION ORAL at 14:25

## 2019-03-31 RX ADMIN — IRBESARTAN 150 MG: 150 TABLET ORAL at 11:42

## 2019-03-31 RX ADMIN — NICOTINE POLACRILEX 4 MG: 2 LOZENGE ORAL at 09:09

## 2019-03-31 RX ADMIN — LOPERAMIDE HYDROCHLORIDE 2 MG: 2 CAPSULE ORAL at 19:04

## 2019-03-31 RX ADMIN — ASPIRIN 81 MG: 81 TABLET, COATED ORAL at 17:58

## 2019-03-31 ASSESSMENT — MIFFLIN-ST. JEOR: SCORE: 1726.13

## 2019-03-31 ASSESSMENT — ACTIVITIES OF DAILY LIVING (ADL)
ORAL_HYGIENE: INDEPENDENT
ORAL_HYGIENE: INDEPENDENT
DRESS: INDEPENDENT
HYGIENE/GROOMING: INDEPENDENT
HYGIENE/GROOMING: INDEPENDENT

## 2019-03-31 NOTE — PLAN OF CARE
"Pt. willing to engage in 1:1 with staff.  Pt. denies suicidal ideation; however pt. states that he is depressed, concerned about the future, especially as it involves his employment.  Pt.does state that he has used a lot of \"benzos,\" in the past; feeling ok but just very tired.  Pt. States that he is isolative here in the hospital and is very isolative outside the hospital.  Pt.'s thoughts appear reality based. Pt. Is pleasant and cooperative.   "

## 2019-03-31 NOTE — PROVIDER NOTIFICATION
"Pt isolative and withdrawn to room, out for meals and medications only. Endorses depression 8/10 and anxiety 8/10. Denies any SI/SIB or wish to be dead. C/o multiple stressors and feeling of\" things will never be better\". Reports Phenobarbital helps with anxiety.  Reports would like to know post discharge plans and would like to discuss this with provider, adding is open to whatever out pt services might be appropriate. Behaviors have been calm with no concerns. Pt encourage to seek out and engage in unit activities/spend less time in room. Is planning to be more engage and visible on the evening shift.         03/31/19 1500   Behavioral Health   Hallucinations denies / not responding to hallucinations   Thinking distractable;poor concentration   Orientation person: oriented;place: oriented;date: oriented   Memory baseline memory   Insight admits / accepts;insight appropriate to situation   Judgement impaired   Eye Contact at examiner   Affect blunted, flat;sad   Mood mood is calm   Hygiene other (see comment)  (adequate)   Suicidality other (see comments)  (Pt currently denies)   1. Wish to be Dead No   2. Non-Specific Active Suicidal Thoughts  No   3. Active Sucidal Ideation with any Methods (Not Plan) Without Intent to Act  No   4. Active Suicidal Ideation with Some Intent to Act, Without Specific Plan  No   5. Active Suicidal Ideation with Specific Plan and Intent  No   Change in Protective Factors? No   Enviromental Risk Factors None   Self Injury other (see comment)  (pt currently denies)   Activity isolative;withdrawn   Speech coherent;clear   Medication Sensitivity no stated side effects;no observed side effects   Psychomotor / Gait balanced;steady   Substance Withdrawal Interventions   Substance Withdrawal Interventions monitor substance withdrawal process;maintain safety precautions   Coping/Psychosocial   Verbalized Emotional State depression;anxiety;hopefulness   Plan of Care Reviewed With patient "   Patient Agreement with Plan of Care agrees   Safety   Suicidality Status 15   Withdrawal monitor withdrawal process   Activities of Daily Living   Hygiene/Grooming independent   Oral Hygiene independent   Dress independent   Laundry (Activity did not occur)   Room Organization independent   Activity   Activity Assistance Provided independent

## 2019-03-31 NOTE — PROGRESS NOTES
Internal Medicine Follow Up Note     Patient: Tanner Villatoro  MRN: [id number]  Admission Date: 3/27/2019  Hospital Day # 4    Assessment & Recommendations: Tanner Villatoro is a 64 year old man with a history of depression/anxiety, substance abuse, DM II, hypothyroidism, HTN, HLD, and MI in 2010 s/p PCI who was admitted on 3/27/19 seeking detox from benzodiazepines and Ambien, as well as suicide attempt via Avapro and Synthroid ingestion. IM following along w/ endocrinology. Seen today for high blood pressure.     #Intentional Levothyroxine Overdose. Ingested ~44485 mcg Synthroid on 3/25. Poison control aware and effects can be seen up to 10 days out (currently day #7). FT4 has down trended to 4.69 (from >8; lab could not report) and total T3 to 390 (from 1040). On cholestyramine to help increase elimination of thyroid hormone, and propranolol for tachycardia. Is symptomatically improved w/ HR ranging mostly 70s-90s, today 102. Last EKG wnl (on 3/30 had new 1st deg AVB but this was attributed to propranolol). Note hx of MI putting him at increased overall risk for adverse cardiac events.   - Endocrinology following. Last discussed today. Will continue daily FT4/total T3 through day #10 (4/3). EKG PRN. F/u referral placed as he will need to be seen in clinic after discharge, and obviously should not restart Synthroid at discharge (but may need again in the future).   - Continue cholestyramine for now.   - Patient wishes to stop propranolol d/t fatigue/somnolence. Will try tapering to BID today, once daily tomorrow, then stop but will follow HR closely.   - BP management as below.     #HTN. /92 this AM from SBPs 100s-130s over recent days. Could be related to Synthroid overdose however suspect that this is his baseline HTN reappearing for which he was taking (and overdosed on ~68139 mg) irbesartan 150 mg w/ good reported control prior. Is not thought to be having active benzo withdrawal.   - Restart irbesartan 150  "mg now. Follow BPs closely.      #Benzodiazepine Abuse. On phenobarbital. Management per primary team.     #Major Depressive Disorder, Severe. Flat affect, unmotivated per nursing staff. Management per primary team.   IM will continue to follow closely.      Yoli Sanford PA-C  Hospitalist Service  Pager: [phone]  _________________________________________________________________    Subjective & Interval History:  Chief complaint of headache associated with high blood pressure this morning. Does not like taking propranolol - states he had it after his MI in 2010 and it made him sleepy/groggy and this is happening again. Discussed taking this off slowly which he was not excited about but is in agreement. Denies chest pain, SOB, palpitations, dizziness.     Medications: Reviewed in EPIC.    Physical Exam:    Blood pressure (!) 172/92, pulse 106, temperature 97.6  F (36.4  C), temperature source Oral, resp. rate 16, height 1.778 m (5' 10\"), weight 92.5 kg (203 lb 14.4 oz), SpO2 96 %.    GENERAL: Alert and oriented x 3. Ambulatory on unit, appears comfortable.   PSYCH: Flat affect.   CV: RRR.         "

## 2019-04-01 LAB
GLUCOSE BLDC GLUCOMTR-MCNC: 124 MG/DL (ref 70–99)
GLUCOSE BLDC GLUCOMTR-MCNC: 98 MG/DL (ref 70–99)
INTERPRETATION ECG - MUSE: NORMAL
T3 SERPL-MCNC: 322 NG/DL (ref 60–181)
T4 FREE SERPL-MCNC: 3.86 NG/DL (ref 0.76–1.46)

## 2019-04-01 PROCEDURE — 25000132 ZZH RX MED GY IP 250 OP 250 PS 637: Performed by: NURSE PRACTITIONER

## 2019-04-01 PROCEDURE — 12400001 ZZH R&B MH UMMC

## 2019-04-01 PROCEDURE — 84480 ASSAY TRIIODOTHYRONINE (T3): CPT | Performed by: PHYSICIAN ASSISTANT

## 2019-04-01 PROCEDURE — 25000132 ZZH RX MED GY IP 250 OP 250 PS 637: Performed by: STUDENT IN AN ORGANIZED HEALTH CARE EDUCATION/TRAINING PROGRAM

## 2019-04-01 PROCEDURE — 99232 SBSQ HOSP IP/OBS MODERATE 35: CPT | Performed by: NURSE PRACTITIONER

## 2019-04-01 PROCEDURE — 00000146 ZZHCL STATISTIC GLUCOSE BY METER IP

## 2019-04-01 PROCEDURE — 25000132 ZZH RX MED GY IP 250 OP 250 PS 637: Performed by: PSYCHIATRY & NEUROLOGY

## 2019-04-01 PROCEDURE — 36415 COLL VENOUS BLD VENIPUNCTURE: CPT | Performed by: PHYSICIAN ASSISTANT

## 2019-04-01 PROCEDURE — 84439 ASSAY OF FREE THYROXINE: CPT | Performed by: PHYSICIAN ASSISTANT

## 2019-04-01 RX ORDER — PHENOBARBITAL 32.4 MG/1
32.4 TABLET ORAL
Status: DISCONTINUED | OUTPATIENT
Start: 2019-04-01 | End: 2019-04-05

## 2019-04-01 RX ORDER — DOXEPIN HYDROCHLORIDE 25 MG/1
25 CAPSULE ORAL
Status: DISCONTINUED | OUTPATIENT
Start: 2019-04-01 | End: 2019-04-05 | Stop reason: HOSPADM

## 2019-04-01 RX ORDER — PHENOBARBITAL 32.4 MG/1
64.8 TABLET ORAL 2 TIMES DAILY
Status: DISCONTINUED | OUTPATIENT
Start: 2019-04-01 | End: 2019-04-05

## 2019-04-01 RX ADMIN — NICOTINE POLACRILEX 4 MG: 2 LOZENGE ORAL at 20:08

## 2019-04-01 RX ADMIN — LIRAGLUTIDE 1.8 MG: 6 INJECTION SUBCUTANEOUS at 17:40

## 2019-04-01 RX ADMIN — IBUPROFEN 800 MG: 600 TABLET ORAL at 09:17

## 2019-04-01 RX ADMIN — CHOLESTYRAMINE 4 G: 4 POWDER, FOR SUSPENSION ORAL at 21:40

## 2019-04-01 RX ADMIN — NICOTINE POLACRILEX 4 MG: 2 LOZENGE ORAL at 14:51

## 2019-04-01 RX ADMIN — NICOTINE POLACRILEX 4 MG: 2 LOZENGE ORAL at 16:10

## 2019-04-01 RX ADMIN — DOXEPIN HYDROCHLORIDE 25 MG: 25 CAPSULE ORAL at 20:06

## 2019-04-01 RX ADMIN — NICOTINE POLACRILEX 4 MG: 2 LOZENGE ORAL at 18:53

## 2019-04-01 RX ADMIN — CHOLESTYRAMINE 4 G: 4 POWDER, FOR SUSPENSION ORAL at 14:48

## 2019-04-01 RX ADMIN — PHENOBARBITAL 32.4 MG: 32.4 TABLET ORAL at 14:48

## 2019-04-01 RX ADMIN — CHOLESTYRAMINE 4 G: 4 POWDER, FOR SUSPENSION ORAL at 06:30

## 2019-04-01 RX ADMIN — PHENOBARBITAL 64.8 MG: 64.8 TABLET ORAL at 09:13

## 2019-04-01 RX ADMIN — METFORMIN HYDROCHLORIDE 1000 MG: 500 TABLET ORAL at 17:39

## 2019-04-01 RX ADMIN — NICOTINE POLACRILEX 4 MG: 2 LOZENGE ORAL at 21:40

## 2019-04-01 RX ADMIN — VENLAFAXINE HYDROCHLORIDE 150 MG: 150 CAPSULE, EXTENDED RELEASE ORAL at 09:13

## 2019-04-01 RX ADMIN — NICOTINE POLACRILEX 4 MG: 2 LOZENGE ORAL at 09:13

## 2019-04-01 RX ADMIN — NICOTINE POLACRILEX 4 MG: 2 LOZENGE ORAL at 17:40

## 2019-04-01 RX ADMIN — ATORVASTATIN CALCIUM 40 MG: 40 TABLET, FILM COATED ORAL at 17:39

## 2019-04-01 RX ADMIN — PHENOBARBITAL 64.8 MG: 32.4 TABLET ORAL at 20:07

## 2019-04-01 RX ADMIN — IBUPROFEN 800 MG: 600 TABLET ORAL at 20:08

## 2019-04-01 RX ADMIN — METFORMIN HYDROCHLORIDE 1000 MG: 500 TABLET ORAL at 09:13

## 2019-04-01 RX ADMIN — ASPIRIN 81 MG: 81 TABLET, COATED ORAL at 17:39

## 2019-04-01 RX ADMIN — RAMELTEON 8 MG: 8 TABLET, FILM COATED ORAL at 20:06

## 2019-04-01 ASSESSMENT — ACTIVITIES OF DAILY LIVING (ADL)
LAUNDRY: WITH SUPERVISION
ORAL_HYGIENE: INDEPENDENT
DRESS: INDEPENDENT
HYGIENE/GROOMING: INDEPENDENT
ORAL_HYGIENE: INDEPENDENT
HYGIENE/GROOMING: INDEPENDENT
DRESS: INDEPENDENT

## 2019-04-01 NOTE — PROGRESS NOTES
Pt spent most of the shift withdrawn in his room. He stated he was feeling depressed and anxious. He stated he feels like this is from his meds. He did not go to group, did not take a shower, and had no visitors. Pt denied SI/SIB/HI and hallucinations. No concerns for staff.       04/01/19 1300   Behavioral Health   Hallucinations denies / not responding to hallucinations   Thinking intact   Orientation person: oriented;place: oriented   Memory baseline memory   Insight insight appropriate to situation;insight appropriate to events   Judgement intact   Eye Contact at examiner   Affect blunted, flat;sad   Mood anxious;depressed   Physical Appearance/Attire neat   Hygiene well groomed   Suicidality other (see comments)  (Denies)   1. Wish to be Dead No   2. Non-Specific Active Suicidal Thoughts  No   Enviromental Risk Factors None   Self Injury other (see comment)  (Denies)   Activity isolative;withdrawn   Speech clear;coherent   Medication Sensitivity no stated side effects;no observed side effects   Psychomotor / Gait balanced;steady   Activities of Daily Living   Hygiene/Grooming independent   Oral Hygiene independent   Dress independent   Laundry with supervision   Room Organization independent

## 2019-04-01 NOTE — PROGRESS NOTES
[facility], [place]   Psychiatric Progress Note      Impression:     Tanner Villatoro is a 64-year-old male admitted to [facility] detox on 3/27/2019 due to anxiety and depressive symptoms and following a suicide attempt by overdose on Synthroid on 3/25/2019.  He had been using benzos for 9 years and had been taking 8-9 tablets of Ativan 1 mg and 2-3 tablets of Ambien CR 12.5 mg daily.  Last use of Ativan was 3/22/2019.  He was transferred to [facility].  He is on the [facility] for scoring.  A Phenobarbital taper was initiated.  Effexor was continued.  Rozerem was initiated  Propranolol was initiated temporarily following his Synthroid overdose, then discontinued.  PRN Trazodone was available but he reported side effects and it was discontinued.  PRNs of Doxepin, Zyprexa and Vistaril are available.  Mood is depressed but less anxious.  He denies suicidal ideation.           Diagnoses:     Major depressive disorder, severe, recurrent, without psychotic features  Panic disorder  Sedative hypnotic and anxiolytic use disorder  History of opiate use disorder  History of alcohol use disorder  Type 2 diabetes  Hypothyroidism  Hyperlipidemia  Coronary artery disease, history of MI in 2010  History of hypertension  Back pain  Synthroid and Avapro overdose 3/25/2019.           Plan:     Medications:  Continue Phenobarbital taper, 64.8 - 32.4 - 64.8 mg.  Continue Effexor  mg daily.  Continue Rozerem 8 mg at HS.  Continue PRNs of Vistaril and Zyprexa.  Discontinue PRN Trazodone.  Begin Doxepin 25 mg at HS PRN.      Internal medicine continuing to follow status post Synthroid overdose on 3/25/2019.      Plan for CD evaluation through [facility] staff today at 1030.  He is considering outpatient versus residential CD treatment with the possibility of discharge to home in the interim.  He has an outpatient psychiatrist and PCP, but not a therapist.       Clinical Global  "Impressions  First:  Considering your total clinical experience with this particular patient population, how severe are the patient's symptoms at this time?: 7 (03/28/19 0904)  Compared to the patient's condition at the START of treatment, this patient's condition is:: 1 (03/28/19 0904)  Most recent:  Considering your total clinical experience with this particular patient population, how severe are the patient's symptoms at this time?: 7 (03/28/19 0904)  Compared to the patient's condition at the START of treatment, this patient's condition is:: 1 (03/28/19 0904)    Attestation:  Patient has been seen and evaluated by me, ONEYDA Smith CNP  The patient was counseled on nature of illness and treatment plan/options  Care was coordinated with treatment team          Interim History:     The patient's care was discussed with the treatment team and chart notes were reviewed.  Pt was documented as sleeping 6.5, 7.5 and 5.75 hours during the weekend overnight shifts.  States his sleep is improved but remains problematic.  He hasn't been taking PRN Trazodone.  \"I don't like how it makes me feel.\"  Will try PRN Doxepin instead.  He reports anxiety has been lower and he hasn't needed any PRN anxiolytics.  His mood remains depressed.  He feels tired and light-headed.  He denies suicidal ideation.  Reports that diarrhea is less problematic.  He has been spending much of his time in bed, not attending groups.           Medications:     Current Facility-Administered Medications   Medication     alum & mag hydroxide-simethicone (MYLANTA ES/MAALOX  ES) suspension 30 mL     aspirin EC tablet 81 mg     atorvastatin (LIPITOR) tablet 40 mg     bisacodyl (DULCOLAX) Suppository 10 mg     cholestyramine (QUESTRAN) Packet 4 g     glucose gel 15-30 g    Or     dextrose 50 % injection 25-50 mL    Or     glucagon injection 1 mg     doxepin (SINEquan) capsule 25 mg     hydrOXYzine (ATARAX) tablet 50 mg     ibuprofen " "(ADVIL/MOTRIN) tablet 800 mg     irbesartan (AVAPRO) tablet 150 mg     liraglutide (VICTOZA) injection 1.8 mg     loperamide (IMODIUM) capsule 2 mg     magnesium hydroxide (MILK OF MAGNESIA) suspension 30 mL     metFORMIN (GLUCOPHAGE) tablet 1,000 mg     nicotine (COMMIT) lozenge 2-4 mg     OLANZapine (zyPREXA) tablet 2.5 mg     PHENobarbital (LUMINAL) tablet 32.4 mg     PHENobarbital (LUMINAL) tablet 64.8 mg     ramelteon (ROZEREM) tablet 8 mg     venlafaxine (EFFEXOR-XR) 24 hr capsule 150 mg             Allergies:   No Known Allergies         Psychiatric Examination:   /61 (BP Location: Left arm)   Pulse 91   Temp 98.3  F (36.8  C) (Oral)   Resp 16   Ht 1.778 m (5' 10\")   Wt 92.5 kg (203 lb 14.4 oz)   SpO2 96%   BMI 29.26 kg/m    Weight is 203 lbs 14.4 oz  Body mass index is 29.26 kg/m .    Appearance:  awake, alert, adequately groomed and dressed in hospital scrubs  Attitude:  cooperative  Eye Contact:  good  Mood:  depressed, less anxious  Affect:  appropriate and in normal range and mood congruent  Speech:  clear, coherent  Psychomotor Behavior:  no evidence of tardive dyskinesia, dystonia, or tics  Thought Process:  logical, linear and goal oriented  Associations:  no loose associations  Thought Content:  no evidence of suicidal ideation or homicidal ideation and no evidence of psychotic thought  Insight:  good  Judgment:  intact  Oriented to:  date, time, person, and place  Attention Span and Concentration:  fair to good  Recent and Remote Memory:  fair to good  Language:  intact  Fund of Knowledge:  appropriate  Muscle Strength and Tone:  normal  Gait and Station:  normal         Labs:     Recent Results (from the past 24 hour(s))   Glucose by meter    Collection Time: 03/31/19 11:51 AM   Result Value Ref Range    Glucose 98 70 - 99 mg/dL   Glucose by meter    Collection Time: 03/31/19  5:31 PM   Result Value Ref Range    Glucose 103 (H) 70 - 99 mg/dL   T3 total    Collection Time: 04/01/19  " 7:47 AM   Result Value Ref Range    Triiodothyronine (T3) 322 (H) 60 - 181 ng/dL   T4 free    Collection Time: 04/01/19  7:47 AM   Result Value Ref Range    T4 Free 3.86 (H) 0.76 - 1.46 ng/dL

## 2019-04-01 NOTE — PROGRESS NOTES
Brief Medicine Note    Tanner Villatoro is a 64 year old male with a history of depression, anxiety, substance abuse, DM II, hypothyroidism, HTN, HLD, and MI in 2010 s/p PCI who was admitted on 3/27/19 seeking detox from benzodiazepines and Ambien, as well as suicide attempt via Avapro and Synthroid ingestion. IM following along with Endocrinology for Levothyroxine overdose and HTN.     #Intentional Levothyroxine Overdose - Ingested ~48052 mcg Synthroid on 3/25. Poison control aware and effects can be seen up to 10 days out (currently day #8). FT4 has down trended to 3.86 (from >8; lab could not report) and total T3 to 322 (from 1040). On Cholestyramine to help increase elimination of thyroid hormone and propranolol for tachycardia which is currently being tapered off per patient request. HR primarily in 70s-90s. Last EKG wnl (on 3/30 had new 1st deg AVB but this was attributed to Propranolol). Note hx of MI putting him at increased overall risk for adverse cardiac events.   - Endocrinology following, planning to see 4/2. Last discussed today.   - Monitor daily FT4/total T3 through day #10 (4/3).   - Repeat EKG PRN.   - Continue Cholestyramine for now.   - Monitor HR closely off Propanolol.  - Should not restart Synthroid at discharge (but may need again in the future).   - Follow up in Endocrinology Clinic after discharge.     #Labile BP - Hypertensive to 172/92 on 3/31 for which home Irbesartan was resumed (takes for HTN). /61 this AM (sitting) --> 87/54 (standing) for which Irbesartan held; asymptomatic per RN staff. RN staff note extremely labile BP throughout the day - likely multifactorial related to Synthroid overdose, Phenobarbital taper/benzo withdrawal, and baseline HTN.   - Continue PTA Irbesartan 150 mg daily with hold parameters. Follow BPs closely.      Medicine will continue to follow. Please page the on-call MICAH for any intercurrent medical issues which arise.     Amelia Hawkins PA-C  Hospitalist  Service  [phone]

## 2019-04-02 LAB
GLUCOSE BLDC GLUCOMTR-MCNC: 156 MG/DL (ref 70–99)
GLUCOSE BLDC GLUCOMTR-MCNC: 97 MG/DL (ref 70–99)
T3 SERPL-MCNC: 258 NG/DL (ref 60–181)
T4 FREE SERPL-MCNC: 3.03 NG/DL (ref 0.76–1.46)

## 2019-04-02 PROCEDURE — 12400001 ZZH R&B MH UMMC

## 2019-04-02 PROCEDURE — 25000132 ZZH RX MED GY IP 250 OP 250 PS 637: Performed by: PHYSICIAN ASSISTANT

## 2019-04-02 PROCEDURE — 25000132 ZZH RX MED GY IP 250 OP 250 PS 637: Performed by: NURSE PRACTITIONER

## 2019-04-02 PROCEDURE — 36415 COLL VENOUS BLD VENIPUNCTURE: CPT | Performed by: PHYSICIAN ASSISTANT

## 2019-04-02 PROCEDURE — 84439 ASSAY OF FREE THYROXINE: CPT | Performed by: PHYSICIAN ASSISTANT

## 2019-04-02 PROCEDURE — 25000132 ZZH RX MED GY IP 250 OP 250 PS 637: Performed by: STUDENT IN AN ORGANIZED HEALTH CARE EDUCATION/TRAINING PROGRAM

## 2019-04-02 PROCEDURE — 99231 SBSQ HOSP IP/OBS SF/LOW 25: CPT | Performed by: NURSE PRACTITIONER

## 2019-04-02 PROCEDURE — 84480 ASSAY TRIIODOTHYRONINE (T3): CPT | Performed by: PHYSICIAN ASSISTANT

## 2019-04-02 PROCEDURE — 00000146 ZZHCL STATISTIC GLUCOSE BY METER IP

## 2019-04-02 RX ORDER — RAMELTEON 8 MG/1
8 TABLET ORAL AT BEDTIME
Qty: 30 TABLET | Refills: 1 | Status: SHIPPED | OUTPATIENT
Start: 2019-04-02

## 2019-04-02 RX ADMIN — RAMELTEON 8 MG: 8 TABLET, FILM COATED ORAL at 20:25

## 2019-04-02 RX ADMIN — NICOTINE POLACRILEX 4 MG: 2 LOZENGE ORAL at 19:36

## 2019-04-02 RX ADMIN — NICOTINE POLACRILEX 4 MG: 2 LOZENGE ORAL at 11:32

## 2019-04-02 RX ADMIN — LIRAGLUTIDE 1.8 MG: 6 INJECTION SUBCUTANEOUS at 17:45

## 2019-04-02 RX ADMIN — HYDROXYZINE HYDROCHLORIDE 50 MG: 50 TABLET, FILM COATED ORAL at 12:20

## 2019-04-02 RX ADMIN — METFORMIN HYDROCHLORIDE 1000 MG: 500 TABLET ORAL at 08:57

## 2019-04-02 RX ADMIN — NICOTINE POLACRILEX 4 MG: 2 LOZENGE ORAL at 09:09

## 2019-04-02 RX ADMIN — PHENOBARBITAL 64.8 MG: 32.4 TABLET ORAL at 20:26

## 2019-04-02 RX ADMIN — NICOTINE POLACRILEX 4 MG: 2 LOZENGE ORAL at 17:46

## 2019-04-02 RX ADMIN — ASPIRIN 81 MG: 81 TABLET, COATED ORAL at 17:44

## 2019-04-02 RX ADMIN — METFORMIN HYDROCHLORIDE 1000 MG: 500 TABLET ORAL at 17:44

## 2019-04-02 RX ADMIN — PHENOBARBITAL 32.4 MG: 32.4 TABLET ORAL at 13:26

## 2019-04-02 RX ADMIN — IRBESARTAN 150 MG: 150 TABLET ORAL at 08:57

## 2019-04-02 RX ADMIN — VENLAFAXINE HYDROCHLORIDE 150 MG: 150 CAPSULE, EXTENDED RELEASE ORAL at 08:57

## 2019-04-02 RX ADMIN — NICOTINE POLACRILEX 4 MG: 2 LOZENGE ORAL at 20:39

## 2019-04-02 RX ADMIN — IBUPROFEN 800 MG: 600 TABLET ORAL at 11:32

## 2019-04-02 RX ADMIN — PHENOBARBITAL 64.8 MG: 32.4 TABLET ORAL at 08:57

## 2019-04-02 RX ADMIN — CHOLESTYRAMINE 4 G: 4 POWDER, FOR SUSPENSION ORAL at 10:24

## 2019-04-02 RX ADMIN — DOXEPIN HYDROCHLORIDE 25 MG: 25 CAPSULE ORAL at 20:25

## 2019-04-02 RX ADMIN — ATORVASTATIN CALCIUM 40 MG: 40 TABLET, FILM COATED ORAL at 17:45

## 2019-04-02 RX ADMIN — NICOTINE POLACRILEX 4 MG: 2 LOZENGE ORAL at 16:22

## 2019-04-02 ASSESSMENT — ACTIVITIES OF DAILY LIVING (ADL)
DRESS: INDEPENDENT
HYGIENE/GROOMING: HANDWASHING;INDEPENDENT
ORAL_HYGIENE: INDEPENDENT
HYGIENE/GROOMING: INDEPENDENT
LAUNDRY: WITH SUPERVISION
ORAL_HYGIENE: INDEPENDENT
DRESS: SCRUBS (BEHAVIORAL HEALTH);INDEPENDENT

## 2019-04-02 NOTE — PROGRESS NOTES
"Patient reported \"sleep is fine\", skipped breakfast and ate lunch 100%. Patient is cooperative, clearly communicates needs, med-compliant and independent with ADLs. Patient attended no daytime groups. NO behaviors.    1230: Patient requested/given PRN atarax for \"anxiety\" and reported \"helped some.\"    MSSA monitored 6 @ 0800 and 3 @ 1300) as patient continues phenobarbital taper.    Blood glucose monitored BID (97 @ 0900).    Patient denies SI/SIB.    Nursing will continue to monitor.          "

## 2019-04-02 NOTE — PROGRESS NOTES
"Endocrine Consult Follow Up   Patient: Tanner Villatoro   MRN: [id number]  Date of Service: 4/2/2019    Subjective:  Tanner is a 64 year old male with a history of hypothyroidism, DM2, HTN, HLD, MDD, and MI s/p PCI in 2010 admitted 3/27/19 following suicide attempt by overdose of levothyroxine and Avapro on 3/25/19. On admission T4 >8.00, TSH 0.04, T3 1040. Initially managed with cholestyramine, propanolol. Pt has remained hemodynamically stable. Thyroid hormone has been downtrending and patient off propranolol since 4/1/2018.     Interim History:  Day 9 since overdose. Patient doing well. Hemodynamically stable. No concerns. Plan is to discharge to treatment center.     ROS:   No symptoms of hyperthyroidism.   ROS otherwise negative.     Medications:   Current Facility-Administered Medications   Medication     alum & mag hydroxide-simethicone (MYLANTA ES/MAALOX  ES) suspension 30 mL     aspirin EC tablet 81 mg     atorvastatin (LIPITOR) tablet 40 mg     bisacodyl (DULCOLAX) Suppository 10 mg     cholestyramine (QUESTRAN) Packet 4 g     glucose gel 15-30 g    Or     dextrose 50 % injection 25-50 mL    Or     glucagon injection 1 mg     doxepin (SINEquan) capsule 25 mg     hydrOXYzine (ATARAX) tablet 50 mg     ibuprofen (ADVIL/MOTRIN) tablet 800 mg     irbesartan (AVAPRO) tablet 150 mg     liraglutide (VICTOZA) injection 1.8 mg     loperamide (IMODIUM) capsule 2 mg     magnesium hydroxide (MILK OF MAGNESIA) suspension 30 mL     metFORMIN (GLUCOPHAGE) tablet 1,000 mg     nicotine (COMMIT) lozenge 2-4 mg     OLANZapine (zyPREXA) tablet 2.5 mg     PHENobarbital (LUMINAL) tablet 32.4 mg     PHENobarbital (LUMINAL) tablet 64.8 mg     ramelteon (ROZEREM) tablet 8 mg     venlafaxine (EFFEXOR-XR) 24 hr capsule 150 mg       Physical Examination:  Blood pressure 148/81, pulse 84, temperature 97.5  F (36.4  C), temperature source Tympanic, resp. rate 16, height 1.778 m (5' 10\"), weight 92.5 kg (203 lb 14.4 oz), SpO2 94 %.  GEN: " Awake, alert, no distress.  HEENT: EOMI.  RESP: Breathing comfortably.   EXT: No peripheral edema.   SKIN: No visible lesions.   NEURO: No tremor.     Labs:   T4 Free   Date Value Ref Range Status   04/02/2019 3.03 (H) 0.76 - 1.46 ng/dL Final   04/01/2019 3.86 (H) 0.76 - 1.46 ng/dL Final   03/31/2019 4.69 (H) 0.76 - 1.46 ng/dL Final   03/30/2019 6.35 (H) 0.76 - 1.46 ng/dL Final   03/29/2019 >8.00 (H) 0.76 - 1.46 ng/dL Final      TSH   Date Value Ref Range Status   03/30/2019 0.02 (L) 0.40 - 4.00 mU/L Final   03/29/2019 0.04 (L) 0.40 - 4.00 mU/L Final   03/28/2019 0.04 (L) 0.40 - 4.00 mU/L Final   03/27/2019 0.11 (L) 0.40 - 4.00 mU/L Final   04/17/2018 3.31 0.40 - 4.00 mU/L Final     (T3) 258 Abnormally high           Assessment:  Tanner Villatoro is a 64 year-old man with a history of hypothyroidism, depression, DM2, and MI s/p PCI in 2010 seen for management following levothyroxine overdose on 3/25/19.     # Levothyroxine overdose: Low suspicion for thyroid storm at admission given normal ECGs and LFTs wnl, and patient was mentating at baseline. As he does have a history of MI, he is at overall increased risk for adverse cardiac effects, although he has been vitally stable and no longer persistently tachycardic. Per poison control and review of the literature, symptoms may persist for 10 days. Currently on day 9 since overdose. T4 twice the upper limit of normal today at 3.03. T3 elevated at 258 but both have continued to downtrend. He has remained in NSR since discontinuing the propranolol.     #Type 2 Diabetes: Blood sugars have been well managed with metformin and Victoza.       Recommendations:   - discontinue cholestyramine today  - recheck T4, T3 this Friday, 4/5/2019  - Will recommend restarting levothyroxine when thyroid hormone wnl  - plan to recheck TSH in 6 weeks    Patient seen and discussed with attending physician, Dr. Stephen and fellow Dr. Bosch.    Jaime Wilcox, MS4  Endocrinology Service     Physician  Attestation   I, Leonora Stephen, was present with the medical student who participated in the service and in the documentation of the note.  I have verified the history and personally performed the physical exam and medical decision making.  I agree with the assessment and plan of care as documented in the note.      I personally reviewed vital signs, medications and labs.    64 year old man admitted after an overdose of levothyroxine. T4 and T3 levels have improved significantly since admission. Okay to discontinue cholestyramine. Continue to trend thyroid hormone levels until normalized (can be drawn every few days at this point).     Leonora Stephen MD  Endocrinology and Diabetes   [phone]  Date of Service (when I saw the patient): 04/02/19

## 2019-04-02 NOTE — PROGRESS NOTES
Pt observed staying in room much of the shift.  Pt did not attend groups, ate lunch only, and is minimally socia with others.  Pt has some medical concerns that once stabilized will help with discharge.  Pt is isolative, low motivation, and slightly irritable at times.  No behavioral problems.       04/02/19 1424   Sleep/Rest/Relaxation   Day/Evening Time Hours napping;resting in bed   Behavioral Health   Hallucinations denies / not responding to hallucinations   Thinking intact   Orientation person: oriented;place: oriented;date: oriented;time: oriented   Memory baseline memory   Insight admits / accepts   Judgement intact   Eye Contact at examiner   Affect blunted, flat   Mood anxious   Physical Appearance/Attire attire appropriate to age and situation   Hygiene other (see comment)  (adequate)   Suicidality other (see comments)  (denies)   1. Wish to be Dead No   2. Non-Specific Active Suicidal Thoughts  No   Activity isolative   Speech clear;coherent   Psychomotor / Gait balanced;steady   Coping/Psychosocial   Verbalized Emotional State depression;powerlessness   Plan of Care Reviewed With patient   Patient Agreement with Plan of Care agrees   Psycho Education   Type of Intervention 1:1 intervention   Response observes from a distance   Activities of Daily Living   Hygiene/Grooming handwashing;independent   Oral Hygiene independent   Dress scrubs (behavioral health);independent   Activity   Activity Assistance Provided independent

## 2019-04-02 NOTE — PROGRESS NOTES
Patient intermittently visible and active in the milieu this evening.  Variably interactive socially with peers and visitors.  Cooperative with personal and unit protocols. Presented as generally alert, calm, pleasant, and focused.   Responsive to writer's MH status and aftercare planning inquiries.  Continues to endorse elevated anxiety and depression but denies all other issus, concerns, and acuities at this time.  Primarily focused on two things this evenin.) aftercare planning status and details, and 2.) getting some personal business-related paperwork notorized before he discharges.   Jonh Lerma   2019   Sleep/Rest/Relaxation   Day/Evening Time Hours resting in bed   Behavioral Health   Hallucinations denies / not responding to hallucinations   Thinking intact   Orientation person: oriented;place: oriented;date: oriented;time: oriented   Memory baseline memory   Insight admits / accepts;insight appropriate to situation;insight appropriate to events   Judgement intact   Eye Contact at examiner   Affect full range affect   Mood mood is calm   Physical Appearance/Attire appears stated age;attire appropriate to age and situation;neat   Hygiene well groomed   Suicidality other (see comments)  (denied SI)   1. Wish to be Dead No   2. Non-Specific Active Suicidal Thoughts  No   Self Injury other (see comment)  (denied SIB urges)   Elopement (no indicators)   Activity other (see comment)  (visible, social, engaged in milieu, had visitors)   Speech clear;coherent   Psychomotor / Gait balanced;steady   Safety   Suicidality Status 15   Violence Risk   Feels Like Hurting Others no   Activities of Daily Living   Hygiene/Grooming independent   Oral Hygiene independent   Dress independent   Room Organization independent

## 2019-04-02 NOTE — PROGRESS NOTES
[facility], [place]   Psychiatric Progress Note      Impression:     Tanner Villatoro is a 64-year-old male admitted to [facility] detox on 3/27/2019 due to anxiety and depressive symptoms and following a suicide attempt by overdose on Synthroid on 3/25/2019.  He had been using benzos for 9 years and had been taking 8-9 tablets of Ativan 1 mg and 2-3 tablets of Ambien CR 12.5 mg daily.  Last use of Ativan was 3/22/2019.  He was transferred to Station [facility].  He is on the [facility] for scoring.  A Phenobarbital taper was initiated.  Effexor was continued.  Rozerem was initiated  Propranolol was initiated temporarily following his Synthroid overdose, then discontinued.  PRN Trazodone was available but he reported side effects and it was discontinued.  PRNs of Doxepin, Zyprexa and Vistaril are available.  Mood is depressed but less anxious.  He denies suicidal ideation.  Sleep is improved         Diagnoses:     Major depressive disorder, severe, recurrent, without psychotic features  Panic disorder  Sedative hypnotic and anxiolytic use disorder  History of opiate use disorder  History of alcohol use disorder  Type 2 diabetes  Hypothyroidism  Hyperlipidemia  Coronary artery disease, history of MI in 2010  History of hypertension  Back pain  Synthroid and Avapro overdose 3/25/2019.           Plan:     Medications:  Continue Phenobarbital taper, 64.8 - 32.4 - 64.8 mg.  Continue Effexor  mg daily.  Continue Rozerem 8 mg at HS.  Continue PRNs of Vistaril and Zyprexa.  Continue Doxepin 25 mg at HS PRN.      Internal medicine and endocrinology continuing to follow status post Synthroid overdose on 3/25/2019.      He had a CD evaluation through [facility] staff 4/1 and was referred to [facility]; [facility] may have immediate openings.  He has an outpatient psychiatrist and PCP, but not a therapist.       Clinical Global Impressions  First:  Considering your total clinical  "experience with this particular patient population, how severe are the patient's symptoms at this time?: 7 (03/28/19 0904)  Compared to the patient's condition at the START of treatment, this patient's condition is:: 1 (03/28/19 0904)  Most recent:  Considering your total clinical experience with this particular patient population, how severe are the patient's symptoms at this time?: 7 (03/28/19 0904)  Compared to the patient's condition at the START of treatment, this patient's condition is:: 1 (03/28/19 0904)    Attestation:  Patient has been seen and evaluated by me, ONEYDA Smith CNP  The patient was counseled on nature of illness and treatment plan/options  Care was coordinated with treatment team          Interim History:     The patient's care was discussed with the treatment team and chart notes were reviewed.  Pt was documented as sleeping 6.5 hours during the overnight shift after taking PRN Doxepin.  States he slept well until a new roommate arrived in the middle of the night.  Yesterday he took PRN Ibuprofen x 2 for headache and back pain.  MSSA scores have been 3 - 5.  Pt reports diarrhea is no longer problematic.  Anxiety \"comes and goes.\"  Appetite is \"okay.\"  Energy remains low.  Mood is depressed.  He denies suicidal thoughts.  He reports spending most of his time in his room, and that no groups are of interest to him.  Pt is eager to learn more information about [facility] and [facility].  Referred him to speak with his CTC.  Discussed that 4/4 will likely be the earliest that he will be medically stable for discharge, pending recommendations from endocrinology.           Medications:     Current Facility-Administered Medications   Medication     alum & mag hydroxide-simethicone (MYLANTA ES/MAALOX  ES) suspension 30 mL     aspirin EC tablet 81 mg     atorvastatin (LIPITOR) tablet 40 mg     bisacodyl (DULCOLAX) Suppository 10 mg     cholestyramine (QUESTRAN) Packet 4 g     glucose gel " "15-30 g    Or     dextrose 50 % injection 25-50 mL    Or     glucagon injection 1 mg     doxepin (SINEquan) capsule 25 mg     hydrOXYzine (ATARAX) tablet 50 mg     ibuprofen (ADVIL/MOTRIN) tablet 800 mg     irbesartan (AVAPRO) tablet 150 mg     liraglutide (VICTOZA) injection 1.8 mg     loperamide (IMODIUM) capsule 2 mg     magnesium hydroxide (MILK OF MAGNESIA) suspension 30 mL     metFORMIN (GLUCOPHAGE) tablet 1,000 mg     nicotine (COMMIT) lozenge 2-4 mg     OLANZapine (zyPREXA) tablet 2.5 mg     PHENobarbital (LUMINAL) tablet 32.4 mg     PHENobarbital (LUMINAL) tablet 64.8 mg     ramelteon (ROZEREM) tablet 8 mg     venlafaxine (EFFEXOR-XR) 24 hr capsule 150 mg             Allergies:   No Known Allergies         Psychiatric Examination:   /75 (BP Location: Left arm)   Pulse 84   Temp 98  F (36.7  C) (Oral)   Resp 16   Ht 1.778 m (5' 10\")   Wt 92.5 kg (203 lb 14.4 oz)   SpO2 94%   BMI 29.26 kg/m    Weight is 203 lbs 14.4 oz  Body mass index is 29.26 kg/m .    Appearance:  awake, alert, adequately groomed and dressed in hospital scrubs  Attitude:  cooperative  Eye Contact:  good  Mood:  depressed, less anxious  Affect:  appropriate and in normal range and mood congruent  Speech:  clear, coherent  Psychomotor Behavior:  no evidence of tardive dyskinesia, dystonia, or tics  Thought Process:  logical, linear and goal oriented  Associations:  no loose associations  Thought Content:  no evidence of suicidal ideation or homicidal ideation and no evidence of psychotic thought  Insight:  good  Judgment:  intact  Oriented to:  date, time, person, and place  Attention Span and Concentration:  fair to good  Recent and Remote Memory:  fair to good  Language:  intact  Fund of Knowledge:  appropriate  Muscle Strength and Tone:  normal  Gait and Station:  normal         Labs:     Recent Results (from the past 24 hour(s))   Glucose by meter    Collection Time: 04/01/19 12:55 PM   Result Value Ref Range    Glucose 98 " 70 - 99 mg/dL   Glucose by meter    Collection Time: 04/01/19  5:41 PM   Result Value Ref Range    Glucose 124 (H) 70 - 99 mg/dL

## 2019-04-03 LAB
GLUCOSE BLDC GLUCOMTR-MCNC: 103 MG/DL (ref 70–99)
GLUCOSE BLDC GLUCOMTR-MCNC: 103 MG/DL (ref 70–99)

## 2019-04-03 PROCEDURE — 99232 SBSQ HOSP IP/OBS MODERATE 35: CPT | Performed by: NURSE PRACTITIONER

## 2019-04-03 PROCEDURE — 25000132 ZZH RX MED GY IP 250 OP 250 PS 637: Performed by: NURSE PRACTITIONER

## 2019-04-03 PROCEDURE — 00000146 ZZHCL STATISTIC GLUCOSE BY METER IP

## 2019-04-03 PROCEDURE — 12400001 ZZH R&B MH UMMC

## 2019-04-03 PROCEDURE — 25000132 ZZH RX MED GY IP 250 OP 250 PS 637: Performed by: PHYSICIAN ASSISTANT

## 2019-04-03 RX ORDER — DOXEPIN HYDROCHLORIDE 25 MG/1
25 CAPSULE ORAL
Qty: 30 CAPSULE | Refills: 1 | Status: SHIPPED | OUTPATIENT
Start: 2019-04-03

## 2019-04-03 RX ORDER — PHENOBARBITAL 32.4 MG/1
TABLET ORAL
Qty: 53 TABLET | Refills: 0 | Status: SHIPPED | OUTPATIENT
Start: 2019-04-03

## 2019-04-03 RX ORDER — IRBESARTAN 150 MG/1
150 TABLET ORAL DAILY
Qty: 30 TABLET | Refills: 1 | Status: SHIPPED | OUTPATIENT
Start: 2019-04-03

## 2019-04-03 RX ORDER — POLYETHYLENE GLYCOL 3350 17 G
2-4 POWDER IN PACKET (EA) ORAL
Start: 2019-04-03

## 2019-04-03 RX ORDER — HYDROXYZINE HYDROCHLORIDE 50 MG/1
50 TABLET, FILM COATED ORAL EVERY 4 HOURS PRN
Qty: 60 TABLET | Refills: 1 | Status: SHIPPED | OUTPATIENT
Start: 2019-04-03

## 2019-04-03 RX ORDER — ATORVASTATIN CALCIUM 40 MG/1
40 TABLET, FILM COATED ORAL DAILY
Qty: 30 TABLET | Refills: 1 | Status: SHIPPED | OUTPATIENT
Start: 2019-04-03

## 2019-04-03 RX ORDER — VENLAFAXINE HYDROCHLORIDE 150 MG/1
150 CAPSULE, EXTENDED RELEASE ORAL
Qty: 30 CAPSULE | Refills: 1 | Status: SHIPPED | OUTPATIENT
Start: 2019-04-03

## 2019-04-03 RX ADMIN — PHENOBARBITAL 64.8 MG: 32.4 TABLET ORAL at 21:00

## 2019-04-03 RX ADMIN — NICOTINE POLACRILEX 4 MG: 2 LOZENGE ORAL at 21:02

## 2019-04-03 RX ADMIN — NICOTINE POLACRILEX 4 MG: 2 LOZENGE ORAL at 19:30

## 2019-04-03 RX ADMIN — PHENOBARBITAL 64.8 MG: 32.4 TABLET ORAL at 10:27

## 2019-04-03 RX ADMIN — DOXEPIN HYDROCHLORIDE 25 MG: 25 CAPSULE ORAL at 20:59

## 2019-04-03 RX ADMIN — RAMELTEON 8 MG: 8 TABLET, FILM COATED ORAL at 20:59

## 2019-04-03 RX ADMIN — NICOTINE POLACRILEX 4 MG: 2 LOZENGE ORAL at 17:52

## 2019-04-03 RX ADMIN — NICOTINE POLACRILEX 4 MG: 2 LOZENGE ORAL at 13:37

## 2019-04-03 RX ADMIN — VENLAFAXINE HYDROCHLORIDE 150 MG: 150 CAPSULE, EXTENDED RELEASE ORAL at 10:27

## 2019-04-03 RX ADMIN — NICOTINE POLACRILEX 4 MG: 2 LOZENGE ORAL at 11:45

## 2019-04-03 RX ADMIN — ASPIRIN 81 MG: 81 TABLET, COATED ORAL at 18:18

## 2019-04-03 RX ADMIN — HYDROXYZINE HYDROCHLORIDE 50 MG: 50 TABLET, FILM COATED ORAL at 13:37

## 2019-04-03 RX ADMIN — METFORMIN HYDROCHLORIDE 1000 MG: 500 TABLET ORAL at 10:27

## 2019-04-03 RX ADMIN — IRBESARTAN 150 MG: 150 TABLET ORAL at 10:27

## 2019-04-03 RX ADMIN — IBUPROFEN 800 MG: 600 TABLET ORAL at 18:16

## 2019-04-03 RX ADMIN — PHENOBARBITAL 32.4 MG: 32.4 TABLET ORAL at 13:37

## 2019-04-03 RX ADMIN — LIRAGLUTIDE 1.8 MG: 6 INJECTION SUBCUTANEOUS at 18:19

## 2019-04-03 RX ADMIN — METFORMIN HYDROCHLORIDE 1000 MG: 500 TABLET ORAL at 18:18

## 2019-04-03 RX ADMIN — ATORVASTATIN CALCIUM 40 MG: 40 TABLET, FILM COATED ORAL at 18:18

## 2019-04-03 ASSESSMENT — ACTIVITIES OF DAILY LIVING (ADL)
HYGIENE/GROOMING: INDEPENDENT
ORAL_HYGIENE: INDEPENDENT
HYGIENE/GROOMING: INDEPENDENT
LAUNDRY: WITH SUPERVISION
ORAL_HYGIENE: INDEPENDENT
DRESS: INDEPENDENT

## 2019-04-03 NOTE — PLAN OF CARE
Pt was more isolative today, spent most of shift resting in bed in his room. Pt will be discharging tomorrow to go to CD treatment. Pt is hoping to get into [facility]. Affect is blunted, flat. Mood was anxious. No SI/SIB. Will continue to monitor.

## 2019-04-03 NOTE — PROGRESS NOTES
[facility], [place]   Psychiatric Progress Note      Impression:     Tanner Villatoro is a 64-year-old male admitted to [facility] detox on 3/27/2019 due to anxiety and depressive symptoms and following a suicide attempt by overdose on Synthroid on 3/25/2019.  He had been using benzos for 9 years and had been taking 8-9 tablets of Ativan 1 mg and 2-3 tablets of Ambien CR 12.5 mg daily.  Last use of Ativan was 3/22/2019.  He was transferred to [facility].  He is on the [facility] for scoring.  A Phenobarbital taper was initiated.  Effexor was continued.  Rozerem was initiated  Propranolol was initiated temporarily following his Synthroid overdose, then discontinued.  PRN Trazodone was available but he reported side effects and it was discontinued.  PRNs of Doxepin, Zyprexa and Vistaril are available.  Mood is depressed but less anxious.  He denies suicidal ideation.  Sleep is improved         Diagnoses:     Major depressive disorder, severe, recurrent, without psychotic features  Panic disorder  Sedative hypnotic and anxiolytic use disorder  History of opiate use disorder  History of alcohol use disorder  Type 2 diabetes  Hypothyroidism  Hyperlipidemia  Coronary artery disease, history of MI in 2010  History of hypertension  Back pain  Synthroid and Avapro overdose 3/25/2019.           Plan:     Medications:  Continue Phenobarbital taper, 64.8 - 32.4 - 64.8 mg.  Continue Effexor  mg daily.  Continue Rozerem 8 mg at HS.  Continue PRNs of Vistaril and Zyprexa.  Continue Doxepin 25 mg at HS PRN.  Discharge meds ordered.  Discontinue Cholestyramine per endocrinology recommendations.      Internal medicine and endocrinology continuing to follow status post Synthroid overdose on 3/25/2019.      He had a CD evaluation through [facility] staff 4/1 and was referred to [facility].  He plans to discharge tomorrow and go to [facility] when a bed is available.  He must follow  up with his PCP OR endocrinology within 1 week.  He has an endocrinology outpatient referral.  He has an outpatient psychiatrist and PCP, but not a therapist.       Clinical Global Impressions  First:  Considering your total clinical experience with this particular patient population, how severe are the patient's symptoms at this time?: 7 (03/28/19 0904)  Compared to the patient's condition at the START of treatment, this patient's condition is:: 1 (03/28/19 0904)  Most recent:  Considering your total clinical experience with this particular patient population, how severe are the patient's symptoms at this time?: 7 (03/28/19 0904)  Compared to the patient's condition at the START of treatment, this patient's condition is:: 1 (03/28/19 0904)    Attestation:  Patient has been seen and evaluated by me, ONEYDA Smith CNP  The patient was counseled on nature of illness and treatment plan/options  Care was coordinated with treatment team          Interim History:     The patient's care was discussed with the treatment team and chart notes were reviewed.  Pt was documented as sleeping 7 hours during the overnight shift.  He took PRN Doxepin x 1, PRN Vistaril x 1 and PRN Ibuprofen x 1 yesterday.  He has not been attending groups.  He has been somewhat terse during interactions with staff.  MSSA scores have been 3-6.  Pt states he plans to go to [facility] and will go home with his son first if no beds are available tomorrow.  He denies suicidal ideation.  Anxiety is lower.  Mood remains depressed, but improved.           Medications:     Current Facility-Administered Medications   Medication     alum & mag hydroxide-simethicone (MYLANTA ES/MAALOX  ES) suspension 30 mL     aspirin EC tablet 81 mg     atorvastatin (LIPITOR) tablet 40 mg     bisacodyl (DULCOLAX) Suppository 10 mg     glucose gel 15-30 g    Or     dextrose 50 % injection 25-50 mL    Or     glucagon injection 1 mg     doxepin (SINEquan) capsule 25 mg  "    hydrOXYzine (ATARAX) tablet 50 mg     ibuprofen (ADVIL/MOTRIN) tablet 800 mg     irbesartan (AVAPRO) tablet 150 mg     liraglutide (VICTOZA) injection 1.8 mg     loperamide (IMODIUM) capsule 2 mg     magnesium hydroxide (MILK OF MAGNESIA) suspension 30 mL     metFORMIN (GLUCOPHAGE) tablet 1,000 mg     nicotine (COMMIT) lozenge 2-4 mg     OLANZapine (zyPREXA) tablet 2.5 mg     PHENobarbital (LUMINAL) tablet 32.4 mg     PHENobarbital (LUMINAL) tablet 64.8 mg     ramelteon (ROZEREM) tablet 8 mg     venlafaxine (EFFEXOR-XR) 24 hr capsule 150 mg             Allergies:   No Known Allergies         Psychiatric Examination:   /75   Pulse 103   Temp 98.3  F (36.8  C) (Oral)   Resp 16   Ht 1.778 m (5' 10\")   Wt 92.5 kg (203 lb 14.4 oz)   SpO2 94%   BMI 29.26 kg/m    Weight is 203 lbs 14.4 oz  Body mass index is 29.26 kg/m .    Appearance:  awake, alert, adequately groomed and dressed in hospital scrubs  Attitude:  cooperative  Eye Contact:  good  Mood:  less depressed, less anxious  Affect:  appropriate and in normal range and mood congruent  Speech:  clear, coherent  Psychomotor Behavior:  no evidence of tardive dyskinesia, dystonia, or tics  Thought Process:  logical, linear and goal oriented  Associations:  no loose associations  Thought Content:  no evidence of suicidal ideation or homicidal ideation and no evidence of psychotic thought  Insight:  good  Judgment:  intact  Oriented to:  date, time, person, and place  Attention Span and Concentration:  fair to good  Recent and Remote Memory:  fair to good  Language:  intact  Fund of Knowledge:  appropriate  Muscle Strength and Tone:  normal  Gait and Station:  normal         Labs:     Recent Results (from the past 24 hour(s))   Glucose by meter    Collection Time: 04/02/19  5:41 PM   Result Value Ref Range    Glucose 156 (H) 70 - 99 mg/dL     "

## 2019-04-03 NOTE — PROGRESS NOTES
BEHAVIORAL TEAM DISCUSSION    Participants: Mikaela Rankin, JAKE; Karey MALIK, Laura MARY    Progress: pt has made good progress, he is medically clear and waiting on admission from [facility]. He had a phone interview with [facility] today.  Continued Stay Criteria/Rationale: pt will be assessed tomorrow for possible discharge.  Medical/Physical: see chart  Precautions:   Behavioral Orders   Procedures     Code 1 - Restrict to Unit     Routine Programming     As clinically indicated     Status 15     Every 15 minutes.     Suicide precautions     Patients on Suicide Precautions should have a Combination Diet ordered that includes a Diet selection(s) AND a Behavioral Tray selection for Safe Tray - with utensils, or Safe Tray - NO utensils       Withdrawal precautions     Plan: continue to encourage unit programming.  [facility] is actively working with [facility] to secure necessary treatment.    Rationale for change in precautions or plan: no change.

## 2019-04-03 NOTE — PROGRESS NOTES
Brief Medicine Note    Tanner Villatoor is a 64 year old male with a history of depression, anxiety, substance abuse, DM II, hypothyroidism, HTN, HLD, and MI in 2010 s/p PCI who was admitted on 3/27/19 for polysubstance detox (benzodiazepines, Ambien) and suicide attempt via Avapro and Synthroid ingestion. IM following along with Endocrinology for Levothyroxine overdose and HTN.     #Intentional Levothyroxine Overdose - Ingested ~33735 mcg Synthroid on 3/25. Poison control aware and effects can be seen up to 10 days out (currently day #10). FT4 has down trended to 3.03 (from >8; lab could not report) and total T3 to 258 (from 1040). Propanolol (for tachycardia) discontinued 4/1 per patient request; HR primarily in 80s-90s with occasional spikes >100. Last EKG wnl (on 3/30 had new 1st deg AVB but this was attributed to Propranolol). Note hx of MI putting him at increased overall risk for adverse cardiac events. Cholestyramine (for thyroid hormone elimination) discontinued 4/2 per Endocrinology.   - Endocrinology following. Last discussed today. No contraindications to discharge from their standpoint.  - If patient remains hospitalized on 4/5, Endocrine recommends rechecking T3 and T4 (ordered). Does not need to remain hospitalized for this.  - Monitor HR closely off Propanolol.  - Repeat T3 and T4 within 1 week of discharge and TSH in 6 weeks.  - Endocrine recommends restarting Levothyroxine when thyroid hormone wnl  - Follow up in OP Endocrinology Clinic     #HTN - Home Irbesartan resumed on 3/31. BP normotensive over past 24 hours.  - Continue PTA Irbesartan 150 mg daily with hold parameters. Follow BPs closely.      Medicine will continue to follow. Please page the on-call MICAH for any intercurrent medical issues which arise.     Amelia Hawkins PA-C  Hospitalist Service  [phone]

## 2019-04-03 NOTE — PROGRESS NOTES
"48 Hour RN Note:  Tanner has spent 90% of this evening isolated to his room. When asked why he doesn't attend groups, he says \"because I don't want to color.\" I told him some of the groups during the day shift are educational, about triggers, coping skills, etc. He says he would be interested in AA/NA groups, but that's all.     He rates both depression and anxiety at 9/10. He denies any suicidal plan, but does endorse the SI statement \"I wish I could go to sleepand not wake up.\" He states he sleeps all day just to escape, not because he is particularly tired. He denies AVH, HI, racing thoughts, and displays no aggressive behavior. He is calm & cooperative.    Patient states he is ready for CD treatment at [facility] and says he is doing it for himself, not just for his family. Although he wants treatment, he is anxious about going to [facility], but states his family chose that location so he has to go.     04/02/19 2000   Behavioral Health   Thoughts/Cognition (WDL) ex;all   Hallucinations denies / not responding to hallucinations   Thinking intact   Orientation person: oriented;place: oriented;date: oriented;time: oriented   Memory baseline memory   Insight admits / accepts   Judgement intact   Eye Contact at examiner   Affect/Mood (WDL) ex   Affect blunted, flat;sad   Mood depressed;anxious   ADL Assessment (WDL) WDL   Suicidality (WDL) suicidality   Suicidality thoughts only   1. Wish to be Dead Yes   Wish to be Dead Description endorses \"wish i cld go to sleep & not wake up\"   2. Non-Specific Active Suicidal Thoughts  Yes   3. Active Sucidal Ideation with any Methods (Not Plan) Without Intent to Act  No   4. Active Suicidal Ideation with Some Intent to Act, Without Specific Plan  No   5. Active Suicidal Ideation with Specific Plan and Intent  No   Elopement (WDL) WDL   Activity (WDL) Ex.   Activity isolative;withdrawn   Speech (WDL) WDL   Medication Sensitivity (WDL) WDL   Medication Sensitivity no stated side " effects;no observed side effects   Psychomotor Gait (WDL) WDL   Overt Agression (WDL) WDL   Activities of Daily Living   Hygiene/Grooming independent   Oral Hygiene independent   Dress independent   Laundry with supervision   Room Organization independent

## 2019-04-03 NOTE — PROGRESS NOTES
Pt. declined scheduled Cholestyramine during evening shift and stated that he should not be woken up at 3 a.m for  scheduled Cholestyramine.

## 2019-04-03 NOTE — PROGRESS NOTES
"Faxed requested documents to [facility] (Pt signed JV, filed in chart)    TYRA Jack called back from [phone] and she states he has been cleared medically.  She said that Ute from [facility] will call back as to when he could be admitted.  She could not answer this writer's question \"when can he admit?\".      "

## 2019-04-04 LAB
GLUCOSE BLDC GLUCOMTR-MCNC: 97 MG/DL (ref 70–99)
GLUCOSE BLDC GLUCOMTR-MCNC: 98 MG/DL (ref 70–99)

## 2019-04-04 PROCEDURE — 99232 SBSQ HOSP IP/OBS MODERATE 35: CPT | Performed by: NURSE PRACTITIONER

## 2019-04-04 PROCEDURE — 12400001 ZZH R&B MH UMMC

## 2019-04-04 PROCEDURE — 25000132 ZZH RX MED GY IP 250 OP 250 PS 637: Performed by: NURSE PRACTITIONER

## 2019-04-04 PROCEDURE — 00000146 ZZHCL STATISTIC GLUCOSE BY METER IP

## 2019-04-04 PROCEDURE — 25000132 ZZH RX MED GY IP 250 OP 250 PS 637: Performed by: PHYSICIAN ASSISTANT

## 2019-04-04 RX ADMIN — RAMELTEON 8 MG: 8 TABLET, FILM COATED ORAL at 20:21

## 2019-04-04 RX ADMIN — HYDROXYZINE HYDROCHLORIDE 50 MG: 50 TABLET, FILM COATED ORAL at 11:08

## 2019-04-04 RX ADMIN — IRBESARTAN 150 MG: 150 TABLET ORAL at 08:38

## 2019-04-04 RX ADMIN — HYDROXYZINE HYDROCHLORIDE 50 MG: 50 TABLET, FILM COATED ORAL at 14:27

## 2019-04-04 RX ADMIN — IBUPROFEN 800 MG: 600 TABLET ORAL at 10:33

## 2019-04-04 RX ADMIN — LIRAGLUTIDE 1.8 MG: 6 INJECTION SUBCUTANEOUS at 18:17

## 2019-04-04 RX ADMIN — ATORVASTATIN CALCIUM 40 MG: 40 TABLET, FILM COATED ORAL at 18:13

## 2019-04-04 RX ADMIN — NICOTINE POLACRILEX 4 MG: 2 LOZENGE ORAL at 10:34

## 2019-04-04 RX ADMIN — PHENOBARBITAL 64.8 MG: 32.4 TABLET ORAL at 08:38

## 2019-04-04 RX ADMIN — NICOTINE POLACRILEX 4 MG: 2 LOZENGE ORAL at 20:20

## 2019-04-04 RX ADMIN — METFORMIN HYDROCHLORIDE 1000 MG: 500 TABLET ORAL at 18:13

## 2019-04-04 RX ADMIN — PHENOBARBITAL 32.4 MG: 32.4 TABLET ORAL at 14:24

## 2019-04-04 RX ADMIN — VENLAFAXINE HYDROCHLORIDE 150 MG: 150 CAPSULE, EXTENDED RELEASE ORAL at 08:38

## 2019-04-04 RX ADMIN — DOXEPIN HYDROCHLORIDE 25 MG: 25 CAPSULE ORAL at 20:20

## 2019-04-04 RX ADMIN — NICOTINE POLACRILEX 4 MG: 2 LOZENGE ORAL at 08:41

## 2019-04-04 RX ADMIN — METFORMIN HYDROCHLORIDE 1000 MG: 500 TABLET ORAL at 08:38

## 2019-04-04 RX ADMIN — NICOTINE POLACRILEX 4 MG: 2 LOZENGE ORAL at 18:15

## 2019-04-04 RX ADMIN — ASPIRIN 81 MG: 81 TABLET, COATED ORAL at 18:13

## 2019-04-04 RX ADMIN — PHENOBARBITAL 64.8 MG: 32.4 TABLET ORAL at 20:20

## 2019-04-04 ASSESSMENT — ACTIVITIES OF DAILY LIVING (ADL)
ORAL_HYGIENE: INDEPENDENT
DRESS: SCRUBS (BEHAVIORAL HEALTH)
HYGIENE/GROOMING: INDEPENDENT
LAUNDRY: UNABLE TO COMPLETE
LAUNDRY: UNABLE TO COMPLETE
DRESS: INDEPENDENT
HYGIENE/GROOMING: INDEPENDENT
ORAL_HYGIENE: INDEPENDENT

## 2019-04-04 NOTE — PROGRESS NOTES
[facility], [place]   Psychiatric Progress Note      Impression:     Tanner Villatoro is a 64-year-old male admitted to [facility] detox on 3/27/2019 due to anxiety and depressive symptoms and following a suicide attempt by overdose on Synthroid on 3/25/2019.  He had been using benzos for 9 years and had been taking 8-9 tablets of Ativan 1 mg and 2-3 tablets of Ambien CR 12.5 mg daily.  Last use of Ativan was 3/22/2019.  He was transferred to [facility].  He is on the [facility] for scoring.  A Phenobarbital taper was initiated.  Effexor was continued.  Rozerem was initiated.  Propranolol was initiated temporarily following his Synthroid overdose, then discontinued.  PRN Trazodone was available but he reported side effects and it was discontinued.  PRNs of Doxepin, Zyprexa and Vistaril are available.  Mood is depressed but less anxious.  He denies suicidal ideation.  Sleep is improved.  He had been requesting discharge 4/4 with a plan to rach to [facility] next week, however hours before his scheduled discharge he disclosed worries that suicidal thoughts would return upon discharge home and requested to remain hospitalized until transfer to [facility].           Diagnoses:     Major depressive disorder, severe, recurrent, without psychotic features  Panic disorder  Sedative hypnotic and anxiolytic use disorder  History of opiate use disorder  History of alcohol use disorder  Type 2 diabetes  Hypothyroidism  Hyperlipidemia  Coronary artery disease, history of MI in 2010  History of hypertension  Back pain  Synthroid and Avapro overdose 3/25/2019.           Plan:     Medications:  Continue Phenobarbital taper, 64.8 - 32.4 - 64.8 mg.  Continue Effexor  mg daily.  Continue Rozerem 8 mg at HS.  Continue PRNs of Vistaril and Zyprexa.  Continue Doxepin 25 mg at HS PRN.  Discharge meds ordered.  Discontinue Cholestyramine per endocrinology recommendations.      Internal medicine and  "endocrinology continuing to follow status post Synthroid overdose on 3/25/2019.      He had a CD evaluation through [facility] staff 4/1 and was referred to [facility].  He does not feel safet returning home and plans to discharge to [facility] when a bed is available, likely early next week.  He must follow up with his PCP OR endocrinology within 1 week.  He has an endocrinology outpatient referral.  He has an outpatient psychiatrist and PCP, but not a therapist.         Clinical Global Impressions  First:  Considering your total clinical experience with this particular patient population, how severe are the patient's symptoms at this time?: 7 (03/28/19 0904)  Compared to the patient's condition at the START of treatment, this patient's condition is:: 1 (03/28/19 0904)  Most recent:  Considering your total clinical experience with this particular patient population, how severe are the patient's symptoms at this time?: 5 (04/04/19 0722)  Compared to the patient's condition at the START of treatment, this patient's condition is:: 2 (04/04/19 0722)    Attestation:  Patient has been seen and evaluated by me, ONEYDA Smith CNP  The patient was counseled on nature of illness and treatment plan/options  Care was coordinated with treatment team          Interim History:     The patient's care was discussed with the treatment team and chart notes were reviewed.  Pt was documented as sleeping 7 hours during the overnight shift.  He has been spending much of his time in his room, not attending groups.  States that he would only find the groups useful if they addressed chemical dependency specifically.  Yesterday he took PRN Doxepin x 1, PRN Vistaril x 1 and PRN Ibuprofen x 1.  During today's conversation he denied suicidal ideation.   He discussed concerns about \"sliding back into my old ways, spending time in bed, depressed\" upon discharge home.  Discussed that he has spent most of his time in his " "room during this hospitalization in spite of being urged to attend groups.  Pt verbalized a safety plan to utilize if suicidal thoughts return and agreed with the plan to discharge today, which he had been talking about for 2 days prior.  However his RN later informed me that he was very concerned suicidal thoughts would return following discharge and that he would be unable to cope with them.  He requested to remain hospitalized until transfer to [facility], which is a reasonable request given his circumstances, so the order for discharge was cancelled.           Medications:     Current Facility-Administered Medications   Medication     alum & mag hydroxide-simethicone (MYLANTA ES/MAALOX  ES) suspension 30 mL     aspirin EC tablet 81 mg     atorvastatin (LIPITOR) tablet 40 mg     bisacodyl (DULCOLAX) Suppository 10 mg     glucose gel 15-30 g    Or     dextrose 50 % injection 25-50 mL    Or     glucagon injection 1 mg     doxepin (SINEquan) capsule 25 mg     hydrOXYzine (ATARAX) tablet 50 mg     ibuprofen (ADVIL/MOTRIN) tablet 800 mg     irbesartan (AVAPRO) tablet 150 mg     liraglutide (VICTOZA) injection 1.8 mg     loperamide (IMODIUM) capsule 2 mg     magnesium hydroxide (MILK OF MAGNESIA) suspension 30 mL     metFORMIN (GLUCOPHAGE) tablet 1,000 mg     nicotine (COMMIT) lozenge 2-4 mg     OLANZapine (zyPREXA) tablet 2.5 mg     PHENobarbital (LUMINAL) tablet 32.4 mg     PHENobarbital (LUMINAL) tablet 64.8 mg     ramelteon (ROZEREM) tablet 8 mg     venlafaxine (EFFEXOR-XR) 24 hr capsule 150 mg             Allergies:   No Known Allergies         Psychiatric Examination:   /79 (BP Location: Right arm)   Pulse 101   Temp 98.8  F (37.1  C)   Resp 16   Ht 1.778 m (5' 10\")   Wt 92.5 kg (203 lb 14.4 oz)   SpO2 94%   BMI 29.26 kg/m    Weight is 203 lbs 14.4 oz  Body mass index is 29.26 kg/m .    Appearance:  awake, alert, adequately groomed and dressed in hospital scrubs  Attitude:  cooperative  Eye Contact:  " good  Mood:  less depressed, less anxious  Affect:  appropriate and in normal range and mood congruent  Speech:  clear, coherent  Psychomotor Behavior:  no evidence of tardive dyskinesia, dystonia, or tics  Thought Process:  logical, linear and goal oriented  Associations:  no loose associations  Thought Content:  no evidence of suicidal ideation or homicidal ideation and no evidence of psychotic thought  Insight:  good  Judgment:  intact  Oriented to:  date, time, person, and place  Attention Span and Concentration:  fair to good  Recent and Remote Memory:  fair to good  Language:  intact  Fund of Knowledge:  appropriate  Muscle Strength and Tone:  normal  Gait and Station:  normal         Labs:     Recent Results (from the past 24 hour(s))   Glucose by meter    Collection Time: 04/03/19 12:05 PM   Result Value Ref Range    Glucose 103 (H) 70 - 99 mg/dL   Glucose by meter    Collection Time: 04/03/19  5:50 PM   Result Value Ref Range    Glucose 103 (H) 70 - 99 mg/dL   Glucose by meter    Collection Time: 04/04/19  8:30 AM   Result Value Ref Range    Glucose 97 70 - 99 mg/dL

## 2019-04-04 NOTE — PLAN OF CARE
"Patient began the shift expecting to discharge, however patient expressed to this writer that he was not sure that he could keep himself safe outside of the hospital. Patient also stated that he was \"worried about going home\". Patient appears disheveled and is isolative to his room. Patient has a flat affect. Patient comes out of his room for meals and medications, but does not come out for groups or socialize with peers.     After discussing the patients' statements with the provider, it was decided that the patient should stay if he felt that he should stay. The patient wanted to stay in the hospital because he said he felt safe here. Patient does not feel suicidal in the hospital, but it worried his suicidal thoughts and intent will return when he goes home, because nothing will be different there. Patient denies SIB and does not have any internal stimuli.     Patient had a slightly elevated heart rate this shift. Blood pressure was within normal range this shift. Pt scored 5 x2 on his MSSA this shift. Patient requested and was given 50 mg hydroxyzine x2 per his request for anxiety.   "

## 2019-04-04 NOTE — DISCHARGE SUMMARY
"Psychiatric Discharge Summary    Tanner Villatoro MRN# [id number]   Age: 64 year old YOB: 1955     Date of Admission:  3/27/2019  Date of Discharge:  4/5/2019  Admitting Physician:  Marcus Stevens MD  Discharge Physician:  ONEYDA Smith CNP (Contact: [phone])         Event Leading to Hospitalization:      Tanner Villatoro is a 64-year-old male admitted to [facility] 3A detox on 3/27/2019 due to anxiety and depressive symptoms and following a suicide attempt by overdose on Synthroid on 3/25/2019.  He had been using benzos for 9 years and had been taking 8-9 tablets of Ativan 1 mg and 2-3 tablets of Ambien CR 12.5 mg daily.  Last use of Ativan was 3/22/2019.  He was transferred to [facility].     From H&P 3/28/2019:    The patient is a 64-year-old  male.  He is  and has 3 children.  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  He has a psychiatrist by the name of Dr. Mendoza.      CHIEF COMPLAINT:  \"I made a suicide attempt.\"      HISTORY OF PRESENT ILLNESS:  The patient has chronic depression.  He has been feeling depressed for a long time.  Last year, he was admitted, had ECT and did not feel like it worked.  On Monday, he  did an overdose by taking 2 bottles of his levothyroxine.  He still feels suicidal.  He wishes the plan was successful, and he still has suicidal ideation.  He does not want to do anything.  He says he feels like he is barely functioning.  He is isolated.  His energy is down.  His motivation is down.  His interest is down.  He is not eating, not sleeping.  He did say that in the past, he had a manic episode, but he was very vague about describing it, saying that at times he will be happy and impulsive.  He does not have any homicidal ideation.  Does not have auditory or visual hallucinations.  He does have anxiety.  He describes that he has panic attacks.  When he gets panic attacks, his heart races.  He has shortness of breath.  His has " chest pain.  He has palpitations.     See full admission note by Dr. Mchugh 3/28/2019 for details.           Diagnoses:     Major depressive disorder, severe, recurrent, without psychotic features  Panic disorder  Sedative hypnotic and anxiolytic use disorder  History of opiate use disorder  History of alcohol use disorder  Type 2 diabetes  Hypothyroidism  Hyperlipidemia  Coronary artery disease, history of MI in 2010  History of hypertension  Back pain  Synthroid and Avapro overdose 3/25/2019.         Labs:        Ref. Range 3/27/2019 15:31 3/27/2019 16:25 3/27/2019 20:58 3/28/2019 06:52   Sodium Latest Ref Range: 133 - 144 mmol/L 135      Potassium Latest Ref Range: 3.4 - 5.3 mmol/L 3.5      Chloride Latest Ref Range: 94 - 109 mmol/L 99      Carbon Dioxide Latest Ref Range: 20 - 32 mmol/L 23      Urea Nitrogen Latest Ref Range: 7 - 30 mg/dL 14      Creatinine Latest Ref Range: 0.66 - 1.25 mg/dL 0.86      GFR Estimate Latest Ref Range: >60 mL/min/1.73_m2 >90      GFR Estimate If Black Latest Ref Range: >60 mL/min/1.73_m2 >90      Calcium Latest Ref Range: 8.5 - 10.1 mg/dL 10.2 (H)      Anion Gap Latest Ref Range: 3 - 14 mmol/L 13      Albumin Latest Ref Range: 3.4 - 5.0 g/dL 4.2      Protein Total Latest Ref Range: 6.8 - 8.8 g/dL 7.7      Bilirubin Total Latest Ref Range: 0.2 - 1.3 mg/dL 0.9      Alkaline Phosphatase Latest Ref Range: 40 - 150 U/L 71      ALT Latest Ref Range: 0 - 70 U/L 46      AST Latest Ref Range: 0 - 45 U/L 33      T4 Free Latest Ref Range: 0.76 - 1.46 ng/dL >8.00 (H)      Troponin I ES Latest Ref Range: 0.000 - 0.045 ug/L <0.015      TSH Latest Ref Range: 0.40 - 4.00 mU/L 0.11 (L)      Glucose Latest Ref Range: 70 - 99 mg/dL 147 (H)  158 (H) 123 (H)   WBC Latest Ref Range: 4.0 - 11.0 10e9/L 9.2      Hemoglobin Latest Ref Range: 13.3 - 17.7 g/dL 15.9      Hematocrit Latest Ref Range: 40.0 - 53.0 % 44.3      Platelet Count Latest Ref Range: 150 - 450 10e9/L 245      RBC Count Latest Ref Range:  4.4 - 5.9 10e12/L 5.00      MCV Latest Ref Range: 78 - 100 fl 89      MCH Latest Ref Range: 26.5 - 33.0 pg 31.8      MCHC Latest Ref Range: 31.5 - 36.5 g/dL 35.9      RDW Latest Ref Range: 10.0 - 15.0 % 12.6      Diff Method Unknown Automated Method      % Neutrophils Latest Units: % 75.5      % Lymphocytes Latest Units: % 12.5      % Monocytes Latest Units: % 11.2      % Eosinophils Latest Units: % 0.4      % Basophils Latest Units: % 0.2      % Immature Granulocytes Latest Units: % 0.2      Nucleated RBCs Latest Ref Range: 0 /100 0      Absolute Neutrophil Latest Ref Range: 1.6 - 8.3 10e9/L 7.0      Absolute Lymphocytes Latest Ref Range: 0.8 - 5.3 10e9/L 1.2      Absolute Monocytes Latest Ref Range: 0.0 - 1.3 10e9/L 1.0      Absolute Eosinophils Latest Ref Range: 0.0 - 0.7 10e9/L 0.0      Absolute Basophils Latest Ref Range: 0.0 - 0.2 10e9/L 0.0      Abs Immature Granulocytes Latest Ref Range: 0 - 0.4 10e9/L 0.0      Absolute Nucleated RBC Unknown 0.0      INR Latest Ref Range: 0.86 - 1.14  1.04      PTT Latest Ref Range: 22 - 37 sec 31      Acetaminophen Level Latest Units: mg/L <2      Ethanol g/dL Latest Ref Range: <0.01 g/dL <0.01      Salicylate Level Latest Units: mg/dL <2      Amphetamine Qual Urine Latest Ref Range: NEG^Negative   Negative     Cocaine Qual Urine Latest Ref Range: NEG^Negative   Negative     Opiates Qualitative Urine Latest Ref Range: NEG^Negative   Negative     Cannabinoids Qual Urine Latest Ref Range: NEG^Negative   Negative     Barbiturates Qual Urine Latest Ref Range: NEG^Negative   Negative     Benzodiazepine Qual Urine Latest Ref Range: NEG^Negative   Negative     Ethanol Qual Urine Latest Ref Range: NEG^Negative   Negative          Ref. Range 3/28/2019 12:11 3/28/2019 16:22 3/29/2019 07:28 3/29/2019 08:45   Sodium Latest Ref Range: 133 - 144 mmol/L 139  141    Potassium Latest Ref Range: 3.4 - 5.3 mmol/L 3.6  3.4    Chloride Latest Ref Range: 94 - 109 mmol/L 104  104    Carbon  Dioxide Latest Ref Range: 20 - 32 mmol/L 24  26    Urea Nitrogen Latest Ref Range: 7 - 30 mg/dL 16  22    Creatinine Latest Ref Range: 0.66 - 1.25 mg/dL 0.83  0.86    GFR Estimate Latest Ref Range: >60 mL/min/1.73_m2 >90  >90    GFR Estimate If Black Latest Ref Range: >60 mL/min/1.73_m2 >90  >90    Calcium Latest Ref Range: 8.5 - 10.1 mg/dL 9.3  9.0    Anion Gap Latest Ref Range: 3 - 14 mmol/L 11  11    T4 Free Latest Ref Range: 0.76 - 1.46 ng/dL >8.00 (H)  >8.00 (H)    Triiodothyronine (T3) Latest Ref Range: 60 - 181 ng/dL   1,040 (H)    TSH Latest Ref Range: 0.40 - 4.00 mU/L 0.04 (L)  0.04 (L)    Glucose Latest Ref Range: 70 - 99 mg/dL 112 (H) 126 (H) 106 (H) 120 (H)        Ref. Range 3/29/2019 22:37 3/30/2019 08:42 3/30/2019 09:02 3/30/2019 18:06   Sodium Latest Ref Range: 133 - 144 mmol/L  142     Potassium Latest Ref Range: 3.4 - 5.3 mmol/L  3.4     Chloride Latest Ref Range: 94 - 109 mmol/L  107     Carbon Dioxide Latest Ref Range: 20 - 32 mmol/L  27     Urea Nitrogen Latest Ref Range: 7 - 30 mg/dL  26     Creatinine Latest Ref Range: 0.66 - 1.25 mg/dL  0.70     GFR Estimate Latest Ref Range: >60 mL/min/1.73_m2  >90     GFR Estimate If Black Latest Ref Range: >60 mL/min/1.73_m2  >90     Calcium Latest Ref Range: 8.5 - 10.1 mg/dL  8.4 (L)     Anion Gap Latest Ref Range: 3 - 14 mmol/L  8     T4 Free Latest Ref Range: 0.76 - 1.46 ng/dL  6.35 (H)     Triiodothyronine (T3) Latest Ref Range: 60 - 181 ng/dL  452 (H)     TSH Latest Ref Range: 0.40 - 4.00 mU/L  0.02 (L)     Glucose Latest Ref Range: 70 - 99 mg/dL 140 (H) 118 (H) 108 (H) 146 (H)        Ref. Range 3/31/2019 08:03 3/31/2019 11:51 3/31/2019 17:31 4/1/2019 07:47   T4 Free Latest Ref Range: 0.76 - 1.46 ng/dL 4.69 (H)   3.86 (H)   Triiodothyronine (T3) Latest Ref Range: 60 - 181 ng/dL 390 (H)   322 (H)   Glucose Latest Ref Range: 70 - 99 mg/dL  98 103 (H)         Ref. Range 4/1/2019 12:55 4/1/2019 17:41 4/2/2019 07:53 4/2/2019 09:04   T4 Free Latest Ref  Range: 0.76 - 1.46 ng/dL   3.03 (H)    Triiodothyronine (T3) Latest Ref Range: 60 - 181 ng/dL   258 (H)    Glucose Latest Ref Range: 70 - 99 mg/dL 98 124 (H)  97        Ref. Range 4/2/2019 17:41 4/3/2019 12:05 4/3/2019 17:50   Glucose Latest Ref Range: 70 - 99 mg/dL 156 (H) 103 (H) 103 (H)        Ref. Range 4/4/2019 08:30 4/4/2019 18:18 4/5/2019 08:13 4/5/2019 08:20   T4 Free Latest Ref Range: 0.76 - 1.46 ng/dL   1.73 (H)    Glucose Latest Ref Range: 70 - 99 mg/dL 97 98  111 (H)              Consults:     Consultation during this admission received from internal medicine 3/28/2019:    Assessment & Plan     Tanner Villatoro is a 64 year old male with a history of depression/anxiety, substance abuse, DM type II, hypothyroidism, HTN, HLD, and hx of MI in 2010 s/p PCI, who was admitted on 3/27/19 to [facility] from the ED seeking detox from benzodiazepines and Ambien, as well as suicide attempt via Avapro and Synthroid ingestion.      Suicidal ideation, depression/anxiety: Suicide attempt via Synthroid and Avapro overdose on 3/25 as per below. Tylenol, ethanol, and salicylate levels all negative in the ED. PTA on Effexor, Ativan, trazodone.    - Management per Psychiatry     Polysubstance abuse: Abusing Ativan and Ambien for last 10-12 months. Reports monthly benzodiazepine withdrawals. No history of withdrawal seizures or other complications. Last used both substances on 3/22. UTox in the ED negative, ethanol negative. MSSA currently 7, but suspect his symptoms are secondary to his Synthroid ingestion and not withdrawal given his last benzo use was on 3/22.   - Continue on Phenobarbital   - [facility] protocol     Synthroid overdose  Hypothyroidism  Ingested approximately 135 of his 137 mcg Synthroid tablets on the afternoon of 3/25 in suicide attempt. Last TSH was 3.31 in April 2018, now 0.11 on admission --> 0.04 today. Free T4 >8.00. EKG with sinus tachycardia, no arrhythmias or ischemic changes. Is hemodynamically stable.  HR slightly improved, stable in the high 90s. Pt with 1 day of dizziness and diarrhea on 3/26, now resolved. Currently with tachycardia, flushing, tremor, and anxiety - most likely due to Synthroid overdose vs less likely benzodiazepine withdrawal given last Ativan use on 3/22.    - Discussed with Endocrine, who recommends daily lab monitoring at this time and will formally consult tomorrow.   - Discussed with Poison Control, who reports patient's symptoms could still worsen over the next few days and would expect symptoms to begin resolving within 10 days of his ingestion.    - Ideally, Poison Control recommends using benzodiazepines to treat symptoms, though contraindicated given his Ativan abuse, felt Phenobarbital could provide some mild relief.    - Poison Control recommends daily K checks due to risk of hypokalemia   - Start propranolol 20 mg TID, holding for SBP <100 or HR <50   - Holding PTA Synthroid   - Repeat EKG, BMP in the morning   - TSH, free T4, total T3 in the morning   - Trend daily BMP, TSH, free T4   - Will also check Total T3 in the morning   - Repeat EKG in the morning   - Vital checks q4h, notify medicine if patient persistently tachycardic with HR >110   - Notify medicine if patient develops new cardiac symptoms, if HR persistently >110, If patient develops new cardiac symptoms, consider transfer to medical unit for tele monitoring     Avapro overdose: Ingested approximately 135 of his 150 mg tablets on the afternoon of 3/25 in suicide attempt. Blood pressures have been stable since admission with SBP >100.    - Discussed with Poison Control today, no indication for further monitoring at this time   - Holding PTA Avapro   - Vital signs q4h     Diabetes mellitus, type II: Last known HgbA1C 9.9 in April 2018, PCP records not available. PTA on Victoza, Metformin.    - Continue on PTA Victoza, Metformin   - Blood glucose checks BID   - Hypoglycemia protocol     Hx of HTN, HLD, CAD with hx of MI  in 2010: EKG on 3/27 with sinus tachycardia, no ischemic changes. Avapro overdose on 3/25 as per above. Blood pressures controlled since admission.   - Holding PTA Avapro as per above   - Continue PTA atorvastatin, ASA 81    -----------------------------------------------    Brief Medicine Note 3/30/3029:     Following chart daily for Synthroid overdose. Endocrinology also on board.   FT4 down trending to 6.35 today, total T3 still in process. Is on cholesytramine and propranolol (the latter of which has helped w/ symptoms). HR 70s.   EKG today w/ AK interval increased from 0.16 to 0.21 c/w 1st deg AV block. Suspect that this is d/t beta blocker use.   Will continue current management for now and f/u again tomorrow.   Discussed w/ medicine attending Dr. Duggan.     ------------------------------------------------    Internal Medicine Follow Up Note 3/31/2019:     Assessment & Recommendations: Tanner Villatoro is a 64 year old man with a history of depression/anxiety, substance abuse, DM II, hypothyroidism, HTN, HLD, and MI in 2010 s/p PCI who was admitted on 3/27/19 seeking detox from benzodiazepines and Ambien, as well as suicide attempt via Avapro and Synthroid ingestion. IM following along w/ endocrinology. Seen today for high blood pressure.      #Intentional Levothyroxine Overdose. Ingested ~20202 mcg Synthroid on 3/25. Poison control aware and effects can be seen up to 10 days out (currently day #7). FT4 has down trended to 4.69 (from >8; lab could not report) and total T3 to 390 (from 1040). On cholestyramine to help increase elimination of thyroid hormone, and propranolol for tachycardia. Is symptomatically improved w/ HR ranging mostly 70s-90s, today 102. Last EKG wnl (on 3/30 had new 1st deg AVB but this was attributed to propranolol). Note hx of MI putting him at increased overall risk for adverse cardiac events.   - Endocrinology following. Last discussed today. Will continue daily FT4/total T3 through day  #10 (4/3). EKG PRN. F/u referral placed as he will need to be seen in clinic after discharge, and obviously should not restart Synthroid at discharge (but may need again in the future).   - Continue cholestyramine for now.   - Patient wishes to stop propranolol d/t fatigue/somnolence. Will try tapering to BID today, once daily tomorrow, then stop but will follow HR closely.   - BP management as below.      #HTN. /92 this AM from SBPs 100s-130s over recent days. Could be related to Synthroid overdose however suspect that this is his baseline HTN reappearing for which he was taking (and overdosed on ~91730 mg) irbesartan 150 mg w/ good reported control prior. Is not thought to be having active benzo withdrawal.   - Restart irbesartan 150 mg now. Follow BPs closely.       #Benzodiazepine Abuse. On phenobarbital. Management per primary team.      #Major Depressive Disorder, Severe. Flat affect, unmotivated per nursing staff. Management per primary team.   IM will continue to follow closely.      ----------------------------------------------------    Brief Medicine Note 4/1/2019:     Tanner Villatoro is a 64 year old male with a history of depression, anxiety, substance abuse, DM II, hypothyroidism, HTN, HLD, and MI in 2010 s/p PCI who was admitted on 3/27/19 seeking detox from benzodiazepines and Ambien, as well as suicide attempt via Avapro and Synthroid ingestion. IM following along with Endocrinology for Levothyroxine overdose and HTN.     #Intentional Levothyroxine Overdose - Ingested ~35533 mcg Synthroid on 3/25. Poison control aware and effects can be seen up to 10 days out (currently day #8). FT4 has down trended to 3.86 (from >8; lab could not report) and total T3 to 322 (from 1040). On Cholestyramine to help increase elimination of thyroid hormone and propranolol for tachycardia which is currently being tapered off per patient request. HR primarily in 70s-90s. Last EKG wnl (on 3/30 had new 1st deg AVB but  this was attributed to Propranolol). Note hx of MI putting him at increased overall risk for adverse cardiac events.   - Endocrinology following, planning to see 4/2. Last discussed today.   - Monitor daily FT4/total T3 through day #10 (4/3).   - Repeat EKG PRN.   - Continue Cholestyramine for now.   - Monitor HR closely off Propanolol.  - Should not restart Synthroid at discharge (but may need again in the future).   - Follow up in Endocrinology Clinic after discharge.     #Labile BP - Hypertensive to 172/92 on 3/31 for which home Irbesartan was resumed (takes for HTN). /61 this AM (sitting) --> 87/54 (standing) for which Irbesartan held; asymptomatic per RN staff. RN staff note extremely labile BP throughout the day - likely multifactorial related to Synthroid overdose, Phenobarbital taper/benzo withdrawal, and baseline HTN.   - Continue PTA Irbesartan 150 mg daily with hold parameters. Follow BPs closely.      --------------------------------------------------    Brief Medicine Note 4/3/2019:     Tanner Villatoro is a 64 year old male with a history of depression, anxiety, substance abuse, DM II, hypothyroidism, HTN, HLD, and MI in 2010 s/p PCI who was admitted on 3/27/19 for polysubstance detox (benzodiazepines, Ambien) and suicide attempt via Avapro and Synthroid ingestion. IM following along with Endocrinology for Levothyroxine overdose and HTN.     #Intentional Levothyroxine Overdose - Ingested ~20904 mcg Synthroid on 3/25. Poison control aware and effects can be seen up to 10 days out (currently day #10). FT4 has down trended to 3.03 (from >8; lab could not report) and total T3 to 258 (from 1040). Propanolol (for tachycardia) discontinued 4/1 per patient request; HR primarily in 80s-90s with occasional spikes >100. Last EKG wnl (on 3/30 had new 1st deg AVB but this was attributed to Propranolol). Note hx of MI putting him at increased overall risk for adverse cardiac events. Cholestyramine (for thyroid  hormone elimination) discontinued 4/2 per Endocrinology.   - Endocrinology following. Last discussed today. No contraindications to discharge from their standpoint.  - If patient remains hospitalized on 4/5, Endocrine recommends rechecking T3 and T4 (ordered). Does not need to remain hospitalized for this.  - Monitor HR closely off Propanolol.  - Repeat T3 and T4 within 1 week of discharge and TSH in 6 weeks.  - Endocrine recommends restarting Levothyroxine when thyroid hormone wnl  - Follow up in OP Endocrinology Clinic     #HTN - Home Irbesartan resumed on 3/31. BP normotensive over past 24 hours.  - Continue PTA Irbesartan 150 mg daily with hold parameters. Follow BPs closely.     -----------------------------------------------------    Endocrinology Consult 3/29/2019:       ASSESSMENT/PLAN:   Tanner Villatoro is a 64 year-old man with a history of hypothyroidism, depression, DM2, and MI s/p PCI in 2010 seen for management following levothyroxine overdose on 3/25/19.      # Levothyroxine overdose: Low suspicion for thyroid storm given normal ECGs and LFTs wnl, and patient is mentating at baseline. He is somewhat tremulous on exam and continues to report shakiness and sweatiness. As he does have a history of MI, he is at overall increased risk for adverse cardiac effects, although he has been vitally stable and no longer persistently tachycardic. Per poison control and review of the literature, symptoms may worsen initially over the next few days and may persist for 10 days. Given his history of benzo abuse, they recommended symptomatic management with phenobarbital, and he was also begun on propranolol 20 mg TID yesterday. Given his sustained and marked elevations in  FT4,  we recommend cholestyramine to help increase elimination of thyroid hormone. Blocking the conversion of T4 to T3 would likely be a less helpful strategy at this time given the large dose of levothyroxine that he took.   - Continue to trend T3 and  T4 daily   -- Discussed with lab--they are working to see if they can dilute previously collected specimens for free T4 and T3 to get more accurate measurements   - Continue propranolol 20 mg TID (will consider increasing if symptoms worsen)  - Start cholestyramine 4g q8h     ------------------------------------------------------    Endocrinology Progress Note 4/2/2019:    Assessment:  Tanner Villatoro is a 64 year-old man with a history of hypothyroidism, depression, DM2, and MI s/p PCI in 2010 seen for management following levothyroxine overdose on 3/25/19.      # Levothyroxine overdose: Low suspicion for thyroid storm at admission given normal ECGs and LFTs wnl, and patient was mentating at baseline. As he does have a history of MI, he is at overall increased risk for adverse cardiac effects, although he has been vitally stable and no longer persistently tachycardic. Per poison control and review of the literature, symptoms may persist for 10 days. Currently on day 9 since overdose. T4 twice the upper limit of normal today at 3.03. T3 elevated at 258 but both have continued to downtrend. He has remained in NSR since discontinuing the propranolol.      #Type 2 Diabetes: Blood sugars have been well managed with metformin and Victoza.      Recommendations:   - discontinue cholestyramine today  - recheck T4, T3 this Friday, 4/5/2019  - Will recommend restarting levothyroxine when thyroid hormone wnl  - plan to recheck TSH in 6 weeks         Hospital Course:     Tanner Villatoro was admitted to [facility] with attending Dr. Mchugh as a voluntary patient.  He was subsequently transferred to [facility] with attending Dr. Stevens, under the direct care of Mikaela Rankin NP.  The patient was placed under status 15 (15 minute checks) to ensure patient safety.     A Phenobarbital taper was initiated with MSSA scoring to assess benzodiazepine withdrawal.  Scores were generally 3-6, and he tolerated the Phenobarbital taper well.       Effexor XR was continued.  Rozerem was initiated.  Propranolol was initiated temporarily following his Synthroid overdose, then discontinued.  PRN Trazodone was available, but he reported side effects, and it was discontinued.  PRNs of Doxepin and Vistaril were initiated.  He tolerated his medications well, without complaints of side effects.      Tanner Villatoro did not participate in groups and was rarely visible in the milieu.  He stated he was only intersted in attending groups specific to chemical dependency.  He was somewhat terse during interactions with staff.       He completed a CD evaluation and [facility]  He initially requested discharge 4/4/2019, but later became concerned about the possibility of suicidal thoughts returning if he were to have several days at home between discharge from the hospital and going to CD treatment at [facility].  He elected to remain hospitalized until 4/5/2019, then requested discharged after learning he could go to [facility] on 4/6/2019.      The patient's symptoms of depression improved.  He consistently denied suicidal ideation.  He was hopeful and future-oriented.  Sleep was improved.  Anxiety was reduced.  Appetite was normal.      Tanner Villatoro was released to home with his son, with plans of going to CD treatment at [facility] on 4/6/2019.  At the time of discharge Tanner Villatoro was determined to not be a danger to himself or others.          Discharge Medications:      Tanner Villatoro   Home Medication Instructions JORGE:[id number]    Printed on:04/03/19 5986   Medication Information                      aspirin 81 MG EC tablet  Take 81 mg by mouth daily             atorvastatin (LIPITOR) 40 MG tablet  Take 1 tablet (40 mg) by mouth daily             doxepin (SINEQUAN) 25 MG capsule  Take 1 capsule (25 mg) by mouth nightly as needed for sleep             hydrOXYzine (ATARAX) 50 MG tablet  Take 1 tablet (50 mg) by mouth every 4 hours as  "needed for anxiety             ibuprofen (ADVIL/MOTRIN) 800 MG tablet  Take 800 mg by mouth every 8 hours as needed for moderate pain             irbesartan (AVAPRO) 150 MG tablet  Take 1 tablet (150 mg) by mouth daily .  Hold for SBP < 110.             liraglutide (VICTOZA PEN) 18 MG/3ML soln  Inject 1.8 mg Subcutaneous daily              metFORMIN (GLUCOPHAGE) 500 MG tablet  Take 1,000 mg by mouth 2 times daily (with meals)             nicotine (COMMIT) 2 MG lozenge  Place 1-2 lozenges (2-4 mg) inside cheek every hour as needed for other (nicotine withdrawal symptoms)             PHENobarbital (LUMINAL) 32.4 MG tablet  Take 1 tablet by mouth 4 times daily x 5 days, then take 1 tablet 3 times daily x 5 days, then take 1 tablet 2 times daily x 5 days, then take 1 tablet daily x 5 days, then take 1/2 tablet daily x 6 days, then discontinue.             ramelteon (ROZEREM) 8 MG tablet  Take 1 tablet (8 mg) by mouth At Bedtime             venlafaxine (EFFEXOR-XR) 150 MG 24 hr capsule  Take 1 capsule (150 mg) by mouth daily (with breakfast)                      Psychiatric Examination:     BP (!) 157/107   Pulse 98   Temp 98.3  F (36.8  C) (Oral)   Resp 16   Ht 1.778 m (5' 10\")   Wt 92.5 kg (203 lb 14.4 oz)   SpO2 94%   BMI 29.26 kg/m      Appearance:  awake, alert, adequately groomed and dressed in hospital scrubs  Attitude:  cooperative  Eye Contact:  good  Mood:  less depressed, less anxious  Affect:  appropriate and in normal range and mood congruent  Speech:  clear, coherent  Psychomotor Behavior:  no evidence of tardive dyskinesia, dystonia, or tics  Thought Process:  logical, linear and goal oriented  Associations:  no loose associations  Thought Content:  no evidence of suicidal ideation or homicidal ideation and no evidence of psychotic thought  Insight:  good  Judgment:  intact  Oriented to:  date, time, person, and place  Attention Span and Concentration:  fair to good  Recent and Remote Memory:  fair " to good  Language:  intact  Fund of Knowledge:  appropriate  Muscle Strength and Tone:  normal  Gait and Station:  normal           Discharge Plan:     Per Discharge AVS:        Health Care Follow-up Appointments:     You were referred to [facility].  You have been medically cleared by [facility], however, we are waiting on a call back regarding admission.  [phone]  After you are admitted there, the next morning you will have labs done.  They have been informed you need a thyroid check.      Endocrinology Adult Referral  History of hypothyroidism, seen by Dr. Parr  In hospital for Synthroid overdose and need follow up in the next couple weeks        Your PCP: Dr. Shaun Guthrie MD  Family practice physician in [place], [place]  Address: [facility] [place] [address]  Phone: [phone]    Psychiatry: Dr. Herrera [facility] [address] [place] [address]  [phone]    He was provided with a 30-day supply of medications.  He was offered a glucometer and supplies but declined in spite of educational information regarding the importance of checking his BGs.  He was advised to take his medications as prescribed and to abstain from alcohol and illicit substances.        Clinical Global Impressions  First:  Considering your total clinical experience with this particular patient population, how severe are the patient's symptoms at this time?: 7 (03/28/19 0904)  Compared to the patient's condition at the START of treatment, this patient's condition is:: 1 (03/28/19 0904)  Most recent:  Considering your total clinical experience with this particular patient population, how severe are the patient's symptoms at this time?: 5 (04/04/19 0722)  Compared to the patient's condition at the START of treatment, this patient's condition is:: 2 (04/04/19 0722)      Attestation:  The patient has been seen and evaluated by me, ONEYDA Smith CNP   Discharge time > 30  minutes

## 2019-04-05 VITALS
HEART RATE: 96 BPM | WEIGHT: 203.9 LBS | OXYGEN SATURATION: 94 % | DIASTOLIC BLOOD PRESSURE: 68 MMHG | SYSTOLIC BLOOD PRESSURE: 100 MMHG | TEMPERATURE: 98.4 F | RESPIRATION RATE: 16 BRPM | BODY MASS INDEX: 29.19 KG/M2 | HEIGHT: 70 IN

## 2019-04-05 LAB
GLUCOSE BLDC GLUCOMTR-MCNC: 111 MG/DL (ref 70–99)
T3 SERPL-MCNC: 145 NG/DL (ref 60–181)
T4 FREE SERPL-MCNC: 1.73 NG/DL (ref 0.76–1.46)

## 2019-04-05 PROCEDURE — 84480 ASSAY TRIIODOTHYRONINE (T3): CPT | Performed by: STUDENT IN AN ORGANIZED HEALTH CARE EDUCATION/TRAINING PROGRAM

## 2019-04-05 PROCEDURE — 25000132 ZZH RX MED GY IP 250 OP 250 PS 637: Performed by: NURSE PRACTITIONER

## 2019-04-05 PROCEDURE — 99239 HOSP IP/OBS DSCHRG MGMT >30: CPT | Performed by: NURSE PRACTITIONER

## 2019-04-05 PROCEDURE — 36415 COLL VENOUS BLD VENIPUNCTURE: CPT | Performed by: STUDENT IN AN ORGANIZED HEALTH CARE EDUCATION/TRAINING PROGRAM

## 2019-04-05 PROCEDURE — 25000132 ZZH RX MED GY IP 250 OP 250 PS 637: Performed by: PHYSICIAN ASSISTANT

## 2019-04-05 PROCEDURE — 84439 ASSAY OF FREE THYROXINE: CPT | Performed by: STUDENT IN AN ORGANIZED HEALTH CARE EDUCATION/TRAINING PROGRAM

## 2019-04-05 PROCEDURE — 00000146 ZZHCL STATISTIC GLUCOSE BY METER IP

## 2019-04-05 RX ORDER — PHENOBARBITAL 32.4 MG/1
32.4 TABLET ORAL ONCE
Status: COMPLETED | OUTPATIENT
Start: 2019-04-05 | End: 2019-04-05

## 2019-04-05 RX ORDER — PHENOBARBITAL 32.4 MG/1
32.4 TABLET ORAL 4 TIMES DAILY
Status: DISCONTINUED | OUTPATIENT
Start: 2019-04-06 | End: 2019-04-05 | Stop reason: HOSPADM

## 2019-04-05 RX ORDER — PHENOBARBITAL 32.4 MG/1
32.4 TABLET ORAL ONCE
Status: DISCONTINUED | OUTPATIENT
Start: 2019-04-05 | End: 2019-04-05 | Stop reason: HOSPADM

## 2019-04-05 RX ADMIN — PHENOBARBITAL 32.4 MG: 32.4 TABLET ORAL at 13:56

## 2019-04-05 RX ADMIN — HYDROXYZINE HYDROCHLORIDE 50 MG: 50 TABLET, FILM COATED ORAL at 13:57

## 2019-04-05 RX ADMIN — NICOTINE POLACRILEX 4 MG: 2 LOZENGE ORAL at 15:56

## 2019-04-05 RX ADMIN — IRBESARTAN 150 MG: 150 TABLET ORAL at 08:13

## 2019-04-05 RX ADMIN — METFORMIN HYDROCHLORIDE 1000 MG: 500 TABLET ORAL at 08:13

## 2019-04-05 RX ADMIN — PHENOBARBITAL 64.8 MG: 32.4 TABLET ORAL at 08:14

## 2019-04-05 RX ADMIN — VENLAFAXINE HYDROCHLORIDE 150 MG: 150 CAPSULE, EXTENDED RELEASE ORAL at 08:13

## 2019-04-05 RX ADMIN — HYDROXYZINE HYDROCHLORIDE 50 MG: 50 TABLET, FILM COATED ORAL at 09:07

## 2019-04-05 RX ADMIN — NICOTINE POLACRILEX 4 MG: 2 LOZENGE ORAL at 13:56

## 2019-04-05 RX ADMIN — NICOTINE POLACRILEX 4 MG: 2 LOZENGE ORAL at 09:08

## 2019-04-05 ASSESSMENT — ACTIVITIES OF DAILY LIVING (ADL)
ORAL_HYGIENE: INDEPENDENT
DRESS: INDEPENDENT;SCRUBS (BEHAVIORAL HEALTH)
HYGIENE/GROOMING: INDEPENDENT

## 2019-04-05 NOTE — PROGRESS NOTES
"   04/05/19 1400   Behavioral [facility]   Hallucinations denies / not responding to hallucinations   Thinking poor concentration   Orientation person: oriented;place: oriented   Memory baseline memory   Insight insight appropriate to situation   Judgement impaired   Eye Contact at examiner   Affect blunted, flat   Mood depressed   Physical Appearance/Attire disheveled   Hygiene neglected grooming - unclean body, hair, teeth   Suicidality other (see comments)  (denies)   1. Wish to be Dead Yes   2. Non-Specific Active Suicidal Thoughts  No   Self Injury other (see comment)  (denies)   Elopement (none observed)   Activity isolative;withdrawn   Speech clear;coherent   Medication Sensitivity no observed side effects;no stated side effects   Psychomotor / Gait balanced;steady   Activities of Daily Living   Hygiene/Grooming independent   Oral Hygiene independent   Dress independent;scrubs (behavioral health)   Room Organization independent     Patient had a neutral shift.    Patient stayed in room for most of shift but was respectful when he did come out into milieu.    Patient stated he is not having any SI or SIB but he does wish he was dead but stated \"that's just normal\".    Patient stated he is ready to go to [facility] because he wants to get better.    Patient also stated that he doesn't feel like his meds are working yet and is hopeful they will kick in soon.    No concerns during this shift regarding patient.   "

## 2019-04-05 NOTE — PROGRESS NOTES
Pt isolative, withdrawn to room, in and out of sleep.  Only visible in milieu during dinner and visiting time.  Pt's wife and son visited.  Neglected grooming.  Offers no concerns.         04/04/19 2100   Behavioral Health   Orientation person: oriented   Insight insight appropriate to situation   Affect blunted, flat   Mood depressed   Physical Appearance/Attire disheveled   Hygiene neglected grooming - unclean body, hair, teeth   Suicidality other (see comments)  (KEIKO)   1. Wish to be Dead (KEIKO)   2. Non-Specific Active Suicidal Thoughts  (KEIKO)   Self Injury other (see comment)  (KEIKO)   Activity isolative;withdrawn   Medication Sensitivity no observed side effects   Psychomotor / Gait balanced;steady   Psycho Education   Type of Intervention 1:1 intervention   Response observes from a distance   Activities of Daily Living   Hygiene/Grooming independent   Oral Hygiene independent   Dress independent   Laundry unable to complete   Room Organization independent

## 2019-04-05 NOTE — PROGRESS NOTES
Allison from [facility] called earlier and confirmed that pt has been approved for admission.  He can admit tomorrow 4/6/19 at 3:15pm.

## 2019-04-05 NOTE — DISCHARGE INSTRUCTIONS
Behavioral Discharge Planning and Instructions      Summary:    You were admitted on 3/27/2019  due to Depression, Suicidal Ideations and Chemical Use Issues.  You were treated by Mikaela Rankin NP and discharged on 4/5/2019 from [facility]      Principal Diagnosis:     Major depressive disorder, severe, recurrent, without psychotic features  Panic disorder  Sedative hypnotic and anxiolytic use disorder  History of opiate use disorder  History of alcohol use disorder  Type 2 diabetes  Hypothyroidism  Hyperlipidemia  Coronary artery disease, history of MI in 2010  History of hypertension  Back pain  Synthroid and Avapro overdose 3/25/2019.        Health Care Follow-up Appointments:     [facility]  You have been accepted by [facility] [phone] and can admit tomorrow 4/6/19 at 3:15pm  After you are admitted there, the next morning you will have labs done.  They have been informed you need a thyroid check.      Endocrinology Adult Referral          History of hypothyroidism, seen by Dr. Parr in hospital for Synthroid overdose - now off and needs follow up next couple weeks        Your PCP: Dr. Shaun Guthrie MD  Family practice physician in [place], [place]  Address: [address] [place] [id number]  Phone: [phone]    Psychiatry: Dr. Herrera [place] [address]  [phone]    Attend all scheduled appointments with your outpatient providers. Call at least 24 hours in advance if you need to reschedule an appointment to ensure continued access to your outpatient providers.   Major Treatments, Procedures and Findings:  You were provided with: a psychiatric assessment, assessed for medical stability, medication evaluation and/or management, CD evaluation/assessment and milieu management    Symptoms to Report: losing more sleep, mood getting worse, thoughts of suicide or  "substance abuse    Early warning signs can include: increased depression or anxiety sleep disturbances increased thoughts or behaviors of suicide or self-harm  Or relapse    Safety and Wellness:  Take all medicines as directed.  Make no changes unless your doctor suggests them. Follow treatment recommendations.  Refrain from alcohol and non-prescribed drugs.  If there is a concern for safety, call 911.    Resources:   Crisis Intervention: 922.734.4719 or 265-145-5652 (TTY: 687.390.2261).  Call anytime for help.  National [place] on Mental Illness (www.[place].foreign.org): 230.129.3265 or 989-849-0816.  Alcoholics Anonymous (www.alcoholics-anonymous.org)  Suicide Awareness Voices of Education (SAVE) (www.save.org): 011-203-SAVE (9889)  Self- Management and Recovery Training., SMART-- Toll free: 471.497.2444  www.Goji.iSOCO  [facility] Crisis (COPE) Response - Adult 444 101-8285  Text 4 Life: txt \"LIFE\" to 41946 for immediate support and crisis intervention  Crisis text line: Text \"[place]\" to 599941. Free, confidential, 24/7.      The treatment team has appreciated the opportunity to work with you.     Tanner,  please take care and make your recovery a daily recovery.   If you have any questions or concerns our unit number is [phone]  You may be receiving a follow-up phone call within the next three days from a representative from behavioral health.          "

## 2019-04-05 NOTE — PROGRESS NOTES
Pt was discharged from station 32 at 5 PM. Pt was picked up by his son, will spend the night with his son and will be admitted to [facility] tomorrow at 3:15 PM. Pt was given back his medications he came in with, is items that were in security, his medications from the discharge pharmacy and all of his personal belongings. Pt left the unit with a bright affect and denied any SI/SIB.

## 2019-04-05 NOTE — PROGRESS NOTES
Brief Medicine Note    Tanner Villatoro is a 64 year old male with a history of depression, anxiety, substance abuse, DM II, hypothyroidism, HTN, HLD, and MI in 2010 s/p PCI who was admitted on 3/27/19 for polysubstance detox (benzodiazepines, Ambien) and suicide attempt via Avapro and Synthroid ingestion. IM following along with Endocrinology for Levothyroxine overdose and HTN. Patient discharging to [facility] today.     #Intentional Levothyroxine Overdose - Ingested ~93753 mcg Synthroid on 3/25. Poison control aware and effects can be seen up to 10 days out (currently day #12). FT4 has down trended to 1.73 (from >8; lab could not report) and total T3 normalized to 145 (from 1040). Propanolol (for tachycardia) discontinued 4/1 per patient request. HR primarily in 90s-low 100s. Last EKG wnl (on 3/30 had new 1st deg AVB but this was attributed to Propranolol). Note hx of MI putting him at increased overall risk for adverse cardiac events. Cholestyramine (for thyroid hormone elimination) discontinued 4/2 per Endocrinology.   - Endocrinology following. Last discussed today.  - Repeat T3 and T4 on 4/8 per Endocrinology. Anticipate thyroid tests will both have normalized by then, however, if they haven't should have weekly T3/T4 performed until wnl.  - Home Synthroid (137 mcg daily) should be resumed once T3 and T4 normalize.  - Recheck TSH in 6 weeks.  - D/w [facility] who will perform repeat T3/T4 on 4/8 and resume PTA Synthroid once normalized.  - Updated patient via phone with plan of care who verbalized understanding and was in agreement.  - Follow up in OP Endocrinology Clinic     #HTN - Home Irbesartan resumed on 3/31. BP relatively normotensive over past 24 hours.  - Continue PTA Irbesartan 150 mg daily. Follow BPs closely while at [facility].    Medicine will sign off as patient discharging later today. Please notify Medicine if discharge delayed for any reason.    Amelia Hawkins PA-C  Hospitalist  Service  [phone]

## 2019-09-09 NOTE — PROCEDURES
[facility] ECT Procedure Note     Tanner Villatoro [id number]   63 year old 1955     Patient Status: Inpatient    No Known Allergies    Weight:  223 lbs 8 oz    Patient Preparations: Other (comment) (nothing)         Diagnosis:   Major depression       Indications for ECT:   Medications ineffective       Pause for the Cause:     Right patient Yes   Right procedure/laterality settings: Yes   Right diagnosis Yes          Intra-Procedure Documentation:     Date:  4/27/2018  Time:  6:31 AM    ECT #    Treatment number this series: 5   Total treatment number: 5   Type of ECT:  Bilateral, standard    ECT Medications administered: Brevital: 100mg  Succinyl Choline: 100mg         Clinical Narrative:     ECT was administered by Thymatron machine.  Pt has been medically cleared for procedure, consent signed. Side effects, Risks and benefits reviewed.    ECT Strip Summary:   Energy Level: 90 percent  Motor Seizure Duration: 30 seconds  EEG Seizure Duration: 30 seconds    Complications: No    Plan: 6th ECT 4/30/18  Outpatient Psychiatrist: Dr Pappas      
[facility] ECT Procedure Note     Tanner Villatoro [id number]   63 year old 1955     Patient Status: Inpatient    No Known Allergies    Weight:  227 lbs 12.8 oz    Patient Preparations: Other (comment) (nothing)         Diagnosis:   Major depression       Indications for ECT:   Medications ineffective       Pause for the Cause:     Right patient Yes   Right procedure/laterality settings: Yes   Right diagnosis Yes          Intra-Procedure Documentation:     Date:  4/18/2018  Time:  6:31 AM    ECT #    Treatment number this series: 1   Total treatment number: 1   Type of ECT:  Bilateral, standard    ECT Medications administered: Brevital: 100mg  Succinyl Choline: 100mg         Clinical Narrative:     ECT was administered by Thymatron machine.  Pt has been medically cleared for procedure, consent signed. Side effects, Risks and benefits reviewed.    ECT Strip Summary:   Energy Level: 50 percent  Motor Seizure Duration: 30 seconds  EEG Seizure Duration: 30 seconds    Complications: No    Plan: 2nd ECT 4/20/18  Outpatient Psychiatrist: Dr Pappas      
[facility] ECT Procedure Note     Tanner Villatoro [id number]   63 year old 1955     Patient Status: Inpatient    No Known Allergies    Weight:  223 lbs 8 oz    Patient Preparations: Other (comment) (nothing)         Diagnosis:   Major depression       Indications for ECT:   Medications ineffective       Pause for the Cause:     Right patient Yes   Right procedure/laterality settings: Yes   Right diagnosis Yes          Intra-Procedure Documentation:     Date:  4/30/2018  Time:  6:31 AM    ECT #    Treatment number this series: 6   Total treatment number: 6   Type of ECT:  Bilateral, standard    ECT Medications administered: Brevital: 100mg  Succinyl Choline: 100mg         Clinical Narrative:     ECT was administered by Thymatron machine.  Pt has been medically cleared for procedure, consent signed. Side effects, Risks and benefits reviewed.    ECT Strip Summary:   Energy Level: 100 percent  Motor Seizure Duration: 30 seconds  EEG Seizure Duration: 30 seconds    Complications: No    Plan: done  Outpatient Psychiatrist: Dr Pappas      
[facility] ECT Procedure Note     Tanner Villatoro [id number]   63 year old 1955     Patient Status: Inpatient    No Known Allergies    Weight:  227 lbs 12.8 oz    Patient Preparations: Other (comment) (nothing)         Diagnosis:   Major depression       Indications for ECT:   Medications ineffective       Pause for the Cause:     Right patient Yes   Right procedure/laterality settings: Yes   Right diagnosis Yes          Intra-Procedure Documentation:     Date:  4/20/2018  Time:  6:31 AM    ECT #    Treatment number this series: 2   Total treatment number: 2   Type of ECT:  Bilateral, standard    ECT Medications administered: Brevital: 100mg  Succinyl Choline: 100mg         Clinical Narrative:     ECT was administered by Thymatron machine.  Pt has been medically cleared for procedure, consent signed. Side effects, Risks and benefits reviewed.    ECT Strip Summary:   Energy Level: 60 percent  Motor Seizure Duration: 30 seconds  EEG Seizure Duration: 30 seconds    Complications: No    Plan: 3rd ECT 4/23/18  Outpatient Psychiatrist: Dr Pappas      
[facility] ECT Procedure Note     Tanner Villatoro [id number]   63 year old 1955     Patient Status: Inpatient    No Known Allergies    Weight:  223 lbs 8 oz    Patient Preparations: Other (comment) (nothing)         Diagnosis:   Major depression       Indications for ECT:   Medications ineffective       Pause for the Cause:     Right patient Yes   Right procedure/laterality settings: Yes   Right diagnosis Yes          Intra-Procedure Documentation:     Date:  4/25/2018  Time:  6:31 AM    ECT #    Treatment number this series: 4   Total treatment number: 4   Type of ECT:  Bilateral, standard    ECT Medications administered: Brevital: 100mg  Succinyl Choline: 100mg         Clinical Narrative:     ECT was administered by Thymatron machine.  Pt has been medically cleared for procedure, consent signed. Side effects, Risks and benefits reviewed.    ECT Strip Summary:   Energy Level: 80 percent  Motor Seizure Duration: 30 seconds  EEG Seizure Duration: 30 seconds    Complications: No    Plan: 5th ECT 4/27/18  Outpatient Psychiatrist: Dr Pappas      
[facility] ECT Procedure Note     Tanner Villatoro [id number]   63 year old 1955     Patient Status: Inpatient    No Known Allergies    Weight:  227 lbs 12.8 oz    Patient Preparations: Other (comment) (nothing)         Diagnosis:   Major depression       Indications for ECT:   Medications ineffective       Pause for the Cause:     Right patient Yes   Right procedure/laterality settings: Yes   Right diagnosis Yes          Intra-Procedure Documentation:     Date:  4/23/2018  Time:  6:31 AM    ECT #    Treatment number this series: 3   Total treatment number: 3   Type of ECT:  Bilateral, standard    ECT Medications administered: Brevital: 100mg  Succinyl Choline: 100mg         Clinical Narrative:     ECT was administered by Thymatron machine.  Pt has been medically cleared for procedure, consent signed. Side effects, Risks and benefits reviewed.    ECT Strip Summary:   Energy Level: 70 percent  Motor Seizure Duration: 30 seconds  EEG Seizure Duration: 30 seconds    Complications: No    Plan: 4th ECT 4/25/18  Outpatient Psychiatrist: Dr Pappas      
hand grasp, leg strength strong and equal bilaterally

## 2022-01-01 NOTE — PROGRESS NOTES
[facility] Psychiatric Progress Note       Interim History     The patient's care was discussed with the treatment team and chart notes were reviewed. Pt seen on SDU. Tolerating medications without side effects. Side effects, risks, and benefits of medications reviewed with patient. He had his second ECT this morning, no complications. He will have his third ECT on Monday. He reports that he is feeling tired after ECT and would like to rest this morning.     Hospital Course     On April 18, 2018, pt was directly admitted to [facility] for worsening depression and misuse of benzodiazepines, being prescribed several prescriptions from two different providers. He was started on a series of ECT and placed on a phenobarbital taper at 32.4mg tid for benzodiazepine withdrawal. On April 19, 2018, pt reported he was starting to feel better with ECT and his medications. He will have his second ECT tomorrow and continue with phenobarbital. On April 20, 2018, pt had his second ECT without complications. He will continue on phenobarbital and ECT series.     Medications     Current Facility-Administered Medications Ordered in Epic   Medication Dose Route Frequency Last Rate Last Dose     amoxicillin-clavulanate (AUGMENTIN) 875-125 MG per tablet 1 tablet  1 tablet Oral Q12H SHAMAR   1 tablet at 04/20/18 0751     atorvastatin (LIPITOR) tablet 40 mg  40 mg Oral Daily   40 mg at 04/20/18 0751     glucose gel 15-30 g  15-30 g Oral Q15 Min PRN        Or     dextrose 50 % injection 25-50 mL  25-50 mL Intravenous Q15 Min PRN        Or     glucagon injection 1 mg  1 mg Subcutaneous Q15 Min PRN         eszopiclone (LUNESTA) tablet 3 mg  3 mg Oral At Bedtime   3 mg at 04/19/18 2100     ibuprofen (ADVIL/MOTRIN) tablet 800 mg  800 mg Oral TID PRN   800 mg at 04/19/18 2021     insulin aspart (NovoLOG) inj (RAPID ACTING)  1-7 Units Subcutaneous TID AC   2 Units at 04/20/18 0821     insulin aspart (NovoLOG) inj (RAPID ACTING)  1-5 Units  "Subcutaneous At Bedtime   2 Units at 04/18/18 2105     irbesartan (AVAPRO) tablet 150 mg  150 mg Oral Daily   150 mg at 04/20/18 0751     ketorolac (TORADOL) injection 30 mg  30 mg Intravenous Q6H PRN   30 mg at 04/20/18 0624     lactated ringers infusion  500 mL Intravenous Continuous 25 mL/hr at 04/20/18 0623 500 mL at 04/20/18 0623     levothyroxine (SYNTHROID/LEVOTHROID) tablet 75 mcg  75 mcg Oral Daily   75 mcg at 04/20/18 0751     lidocaine 1 % 1 mL  1 mL Other Q1H PRN   1 mL at 04/20/18 0624     liraglutide (VICTOZA) injection 1.2 mg  1.2 mg Subcutaneous Daily   1.2 mg at 04/20/18 0820     metFORMIN (GLUCOPHAGE) tablet 1,000 mg  1,000 mg Oral BID w/meals   1,000 mg at 04/20/18 0751     mirtazapine (REMERON) tablet 45 mg  45 mg Oral At Bedtime   45 mg at 04/19/18 2101     nicotine polacrilex lozenge 4-8 mg  4-8 mg Buccal Q1H PRN   8 mg at 04/20/18 0801     ondansetron (ZOFRAN-ODT) ODT tab 4 mg  4 mg Oral Q8H PRN         PARoxetine (PAXIL) tablet 20 mg  20 mg Oral Daily   20 mg at 04/20/18 0751     PHENobarbital (LUMINAL) tablet 32.4 mg  32.4 mg Oral TID   32.4 mg at 04/20/18 0751     QUEtiapine (SEROquel) tablet 200-400 mg  200-400 mg Oral At Bedtime   400 mg at 04/19/18 2100     traZODone (DESYREL) tablet 50 mg  50 mg Oral At Bedtime   50 mg at 04/19/18 2101     vortioxetine (TRINTELLIX/BRINTELLIX) tablet 20 mg  20 mg Oral Daily   20 mg at 04/20/18 0751     Current Outpatient Prescriptions Ordered in Epic   Medication     liraglutide (VICTOZA PEN) 18 MG/3ML soln         Allergies      No Known Allergies     Medical Review of Systems     /74  Pulse 63  Temp 97.9  F (36.6  C) (Oral)  Resp 16  Ht 1.778 m (5' 10\")  Wt 103.3 kg (227 lb 12.8 oz)  SpO2 91%  BMI 32.69 kg/m2  Body mass index is 32.69 kg/(m^2).  A 10-point review of systems was performed by Irving Pappas MD and is negative, no new findings.      Psychiatric Examination     Appearance Sitting in chair, dressed in casual clothes. " Appears stated age.   Attitude Cooperative, fatigued   Orientation Oriented to person, place, time   Eye Contact Fair   Speech Regular rate, rhythm, volume and tone   Language Normal   Psychomotor Behavior Normal   Mood Slightly less depressed   Affect Constricted range   Thought Process Goal-Oriented, Intact   Associations Intact   Thought Content Patient is currently negative for suicidal ideation, negative for plan or intent, able to contract no self harm and identify barriers to suicide.  Negative for obsessions, compulsions or psychosis.      Fund of Knowledge Average   Insight Slightly improved   Judgement Slightly improved   Attention Span & Concentration Fair   Recent & Remote Memory Fair   Gait Normal   Muscle Tone Intact        Labs     Labs reviewed.  Recent Results (from the past 24 hour(s))   Drug abuse screen 77 urine (FL, RH, SH)    Collection Time: 04/19/18 11:35 AM   Result Value Ref Range    Amphetamine Qual Urine Negative NEG^Negative    Barbiturates Qual Urine Positive (A) NEG^Negative    Benzodiazepine Qual Urine Negative NEG^Negative    Cannabinoids Qual Urine Negative NEG^Negative    Cocaine Qual Urine Negative NEG^Negative    Opiates Qualitative Urine Negative NEG^Negative    PCP Qual Urine Negative NEG^Negative   UA reflex to Microscopic    Collection Time: 04/19/18 11:35 AM   Result Value Ref Range    Color Urine Light Yellow     Appearance Urine Clear     Glucose Urine >1000 (A) NEG^Negative mg/dL    Bilirubin Urine Negative NEG^Negative    Ketones Urine Negative NEG^Negative mg/dL    Specific Gravity Urine 1.017 1.003 - 1.035    Blood Urine Negative NEG^Negative    pH Urine 6.5 5.0 - 7.0 pH    Protein Albumin Urine Negative NEG^Negative mg/dL    Urobilinogen mg/dL Normal 0.0 - 2.0 mg/dL    Nitrite Urine Negative NEG^Negative    Leukocyte Esterase Urine Negative NEG^Negative    Source Midstream Urine    Glucose by meter    Collection Time: 04/19/18 12:18 PM   Result Value Ref Range     Glucose 241 (H) 70 - 99 mg/dL   Glucose by meter    Collection Time: 04/19/18  4:48 PM   Result Value Ref Range    Glucose 232 (H) 70 - 99 mg/dL   Glucose by meter    Collection Time: 04/19/18  9:00 PM   Result Value Ref Range    Glucose 131 (H) 70 - 99 mg/dL   Glucose by meter    Collection Time: 04/20/18  8:06 AM   Result Value Ref Range    Glucose 214 (H) 70 - 99 mg/dL        Impression     This is a 63 year old male with a history of depression and anxiety who [facility] and [facility] abuse. Discussed pt beginning ECT for treatment-resistant depression. Explained to pt the benefits, side-effects and complications of the procedure. Pt gave verbal consent to series of 6 bilateral ECT treatments starting 4/18/18. ECT treatment plan created, [facility] notified to schedule procedure.  He will have his second ECT on 4/20/18. He will also start on a phenobarbital taper at 32.4mg tid.        Diagnoses     1. Major depression, recurrent, severe, without psychotic features.  2. Panic disorder without agoraphobia.   3. Sedative dependence.  4. Opiate use disorder, in remission.     Plan     1. Explained side effects, benefits, and complications of medications to the patient, Pt gave verbal consent.  2. Medication changes: Continue phenobarb taper.  3. Discussed treatment plan with patient and team.  4. Projected length of stay: Until ECT series has completed.  5. Third ECT on 4/23/18.      Attestation:   Patient has been seen and evaluated by me, Irving Pappas MD.    Patient ID:  Name: Tanner Villatoro  MRN: [id number]  Admission: 4/17/2018   YOB: 1955    [de-identified] : 1 month old male presents for evaluation of laryngomalacia, also lip and tongue tie. Mom states that breathing has gotten worse. Occasional snoring. Breathing worsens with feeds. No daily spit ups. Seen by pediatrician for hard stools. Occult stool positive. No longer doing breast milk due to poor weight gain. Switched formula to hypoallergenic. Gaining weight, now weighs 10 lbs 4 oz. Mom shows video of feeding without increased work of breathing. Mom describes choking as well. Dad is less concerned. Asks about swallow studies as well.\par

## 2022-08-04 NOTE — PROGRESS NOTES
[facility], [place]   Psychiatric Progress Note      Impression:     Tanner Villatoro is a 64-year-old male admitted to [facility] detox on 3/27/2019 due to anxiety and depressive symptoms and following a suicide attempt by overdose on Synthroid on 3/25/2019.  He had been using benzos for 9 years and had been taking 8-9 tablets of Ativan 1 mg and 2-3 tablets of Ambien CR 12.5 mg daily.  Last use of Ativan was 3/22/2019.  He was transferred to [facility].  He is on the [facility] for scoring.  A Phenobarbital taper was initiated.  Effexor was continued.  Rozerem was initiated.  Propranolol was initiated temporarily following his Synthroid overdose, then discontinued.  PRN Trazodone was available but he reported side effects and it was discontinued.  PRNs of Doxepin, Zyprexa and Vistaril are available.  Mood is depressed but less anxious.  He denies suicidal ideation.  Sleep is improved.  He had been requesting discharge 4/4 with a plan to go to [facility] next week, however hours before his scheduled discharge he disclosed worries that suicidal thoughts would return upon discharge home and requested to remain hospitalized until transfer to [facility].           Diagnoses:     Major depressive disorder, severe, recurrent, without psychotic features  Panic disorder  Sedative hypnotic and anxiolytic use disorder  History of opiate use disorder  History of alcohol use disorder  Type 2 diabetes  Hypothyroidism  Hyperlipidemia  Coronary artery disease, history of MI in 2010  History of hypertension  Back pain  Synthroid and Avapro overdose 3/25/2019.           Plan:     Medications:  Continue Phenobarbital taper, 32.4 mg QID beginning tomorrow.  Continue Effexor  mg daily.  Continue Rozerem 8 mg at HS.  Continue PRNs of Vistaril and Zyprexa.  Continue Doxepin 25 mg at HS PRN.  Discharge meds ordered.  Discontinue Cholestyramine per endocrinology recommendations.      Internal  medicine and endocrinology continuing to follow status post Synthroid overdose on 3/25/2019.  He is medically cleared for discharge.      He had a CD evaluation through [facility] staff 4/1 and was referred to [facility].  He does not feel safe returning home and plans to discharge to [facility] when a bed is available, likely early next week.  He must follow up with his PCP OR endocrinology within 1 week.  He has an endocrinology outpatient referral.  He has an outpatient psychiatrist and PCP, but not a therapist.         Clinical Global Impressions  First:  Considering your total clinical experience with this particular patient population, how severe are the patient's symptoms at this time?: 7 (03/28/19 0904)  Compared to the patient's condition at the START of treatment, this patient's condition is:: 1 (03/28/19 0904)  Most recent:  Considering your total clinical experience with this particular patient population, how severe are the patient's symptoms at this time?: 5 (04/04/19 0722)  Compared to the patient's condition at the START of treatment, this patient's condition is:: 2 (04/04/19 0722)    Attestation:  Patient has been seen and evaluated by me, ONEYDA Smith CNP  The patient was counseled on nature of illness and treatment plan/options  Care was coordinated with treatment team          Interim History:     The patient's care was discussed with the treatment team and chart notes were reviewed.  Pt was documented as sleeping 7.5 hours during the overnight shift.  He took PRN Vistaril x 1, PRN Ibuprofen x 1 and PRN Doxepin x 1 yesterday.  He has been spending much of his time in his room.  Again encouraged him to consider groups, and he appeared to be more receptive to this today.  His ex-wife and son visited last evening.  His ex-wife encouraged him to discharge today, however pt continues to worry that his suicidal thoughts will return upon discharge and feels safer going  "directly to CD treatment.  Mood remains depressed but less anxious.           Medications:     Current Facility-Administered Medications   Medication     alum & mag hydroxide-simethicone (MYLANTA ES/MAALOX  ES) suspension 30 mL     aspirin EC tablet 81 mg     atorvastatin (LIPITOR) tablet 40 mg     bisacodyl (DULCOLAX) Suppository 10 mg     glucose gel 15-30 g    Or     dextrose 50 % injection 25-50 mL    Or     glucagon injection 1 mg     doxepin (SINEquan) capsule 25 mg     hydrOXYzine (ATARAX) tablet 50 mg     ibuprofen (ADVIL/MOTRIN) tablet 800 mg     irbesartan (AVAPRO) tablet 150 mg     liraglutide (VICTOZA) injection 1.8 mg     loperamide (IMODIUM) capsule 2 mg     magnesium hydroxide (MILK OF MAGNESIA) suspension 30 mL     metFORMIN (GLUCOPHAGE) tablet 1,000 mg     nicotine (COMMIT) lozenge 2-4 mg     OLANZapine (zyPREXA) tablet 2.5 mg     PHENobarbital (LUMINAL) tablet 32.4 mg     PHENobarbital (LUMINAL) tablet 32.4 mg     [START ON 4/6/2019] PHENobarbital (LUMINAL) tablet 32.4 mg     ramelteon (ROZEREM) tablet 8 mg     venlafaxine (EFFEXOR-XR) 24 hr capsule 150 mg             Allergies:   No Known Allergies         Psychiatric Examination:   /79 (BP Location: Right arm)   Pulse 101   Temp 97.7  F (36.5  C) (Oral)   Resp 16   Ht 1.778 m (5' 10\")   Wt 92.5 kg (203 lb 14.4 oz)   SpO2 94%   BMI 29.26 kg/m    Weight is 203 lbs 14.4 oz  Body mass index is 29.26 kg/m .    Appearance:  awake, alert, adequately groomed and dressed in hospital scrubs  Attitude:  cooperative  Eye Contact:  good  Mood:  ldepressed, less anxious  Affect:  appropriate and in normal range and mood congruent  Speech:  clear, coherent  Psychomotor Behavior:  no evidence of tardive dyskinesia, dystonia, or tics  Thought Process:  logical, linear and goal oriented  Associations:  no loose associations  Thought Content:  no evidence of suicidal ideation or homicidal ideation and no evidence of psychotic thought  Insight:  " good  Judgment:  intact  Oriented to:  date, time, person, and place  Attention Span and Concentration:  fair to good  Recent and Remote Memory:  fair to good  Language:  intact  Fund of Knowledge:  appropriate  Muscle Strength and Tone:  normal  Gait and Station:  normal         Labs:     Recent Results (from the past 24 hour(s))   Glucose by meter    Collection Time: 04/04/19  6:18 PM   Result Value Ref Range    Glucose 98 70 - 99 mg/dL   T4 free    Collection Time: 04/05/19  8:13 AM   Result Value Ref Range    T4 Free 1.73 (H) 0.76 - 1.46 ng/dL   Glucose by meter    Collection Time: 04/05/19  8:20 AM   Result Value Ref Range    Glucose 111 (H) 70 - 99 mg/dL      No risk alerts present

## 2023-09-25 ENCOUNTER — HOSPITAL ENCOUNTER (EMERGENCY)
Facility: CLINIC | Age: 68
Discharge: HOME | End: 2023-09-25
Attending: EMERGENCY MEDICINE | Admitting: EMERGENCY MEDICINE
Payer: COMMERCIAL

## 2023-09-25 ENCOUNTER — APPOINTMENT (OUTPATIENT)
Dept: GENERAL RADIOLOGY | Facility: CLINIC | Age: 68
End: 2023-09-25
Attending: EMERGENCY MEDICINE
Payer: COMMERCIAL

## 2023-09-25 VITALS
TEMPERATURE: 97.1 F | DIASTOLIC BLOOD PRESSURE: 97 MMHG | HEART RATE: 98 BPM | BODY MASS INDEX: 28.63 KG/M2 | WEIGHT: 200 LBS | RESPIRATION RATE: 16 BRPM | OXYGEN SATURATION: 90 % | SYSTOLIC BLOOD PRESSURE: 130 MMHG | HEIGHT: 70 IN

## 2023-09-25 DIAGNOSIS — S42.214A CLOSED NONDISPLACED FRACTURE OF SURGICAL NECK OF RIGHT HUMERUS, UNSPECIFIED FRACTURE MORPHOLOGY, INITIAL ENCOUNTER: ICD-10-CM

## 2023-09-25 PROCEDURE — 73030 X-RAY EXAM OF SHOULDER: CPT | Mod: RT

## 2023-09-25 PROCEDURE — 250N000013 HC RX MED GY IP 250 OP 250 PS 637: Performed by: EMERGENCY MEDICINE

## 2023-09-25 PROCEDURE — 99284 EMERGENCY DEPT VISIT MOD MDM: CPT | Mod: 25

## 2023-09-25 PROCEDURE — 23600 CLTX PROX HUMRL FX W/O MNPJ: CPT | Mod: RT

## 2023-09-25 RX ORDER — OXYCODONE HYDROCHLORIDE 5 MG/1
5 TABLET ORAL ONCE
Status: COMPLETED | OUTPATIENT
Start: 2023-09-25 | End: 2023-09-25

## 2023-09-25 RX ORDER — OXYCODONE HYDROCHLORIDE 5 MG/1
5 TABLET ORAL EVERY 6 HOURS PRN
Qty: 12 TABLET | Refills: 0 | Status: SHIPPED | OUTPATIENT
Start: 2023-09-25 | End: 2023-09-28

## 2023-09-25 RX ORDER — ACETAMINOPHEN 325 MG/1
975 TABLET ORAL ONCE
Status: COMPLETED | OUTPATIENT
Start: 2023-09-25 | End: 2023-09-25

## 2023-09-25 RX ADMIN — ACETAMINOPHEN 975 MG: 325 TABLET, FILM COATED ORAL at 19:02

## 2023-09-25 RX ADMIN — OXYCODONE HYDROCHLORIDE 5 MG: 5 TABLET ORAL at 19:01

## 2023-09-25 ASSESSMENT — ACTIVITIES OF DAILY LIVING (ADL): ADLS_ACUITY_SCORE: 35

## 2023-09-25 NOTE — DISCHARGE INSTRUCTIONS
Please return to the ED if notice increasing pain, swelling, numbness or tingling, fevers or other acute changes.  May try to loosen your splint first but if symptoms persist please return to the ED.  Please see orthopedics within 5-7 days    Discharge Instructions  Extremity Injury    You were seen today for an injury to an extremity (arm, hand, leg, or foot). You may have a bruise, strain, or fracture (broken bone).    Generally, every Emergency Department visit should have a follow-up clinic visit with either a primary or a specialty clinic/provider. Please follow-up as instructed by your emergency provider today.  Return to the Emergency Department right away if:  Your pain seems to change or get worse or there is pain in a new area that wasn t evaluated today.  Your extremity becomes pale, cool, blue, or numb or tingling past the injury.  You have more drainage, redness or pain in the area of the cut or abrasion.  You have pain that you cannot control with the medicine recommended or prescribed here, or you have pain that seems too much for your injury.  Your child (who is injured) will not stop crying or is much more fussy than normal.  You have new symptoms or anything that worries you.    What to Expect:  Your swelling and pain may be worse the day after your injury, but should not be severe and should start getting better after that. You should not have new symptoms and your pain should not get worse.  You may start to get a bruise over the injured area or below the injured area (bruising can follow gravity).  Your movement and strength should get better with time.  Some injuries may not show up until after you have left the Emergency Department so it is important to follow-up as directed.  Your injury may prevent you from working.  Follow-up with your regular provider to get a work release note.  Pain medications or your injury may make it unsafe to drive or operate machinery.    Home Care:  RICE: Rest,  Ice, Compression, Elevation  Rest: Rest your injured area for at least 1-2 days. After that you may start using your extremity again as long as there is not too much pain.   Ice: Apply ice your injured area for 15 minutes at a time, at least 3 times a day. Use a cloth between the ice bag and your skin to prevent frostbite. Do not sleep with an ice pack or heating pad on, since this can cause burns or skin injury.  Compression: You may use an elastic bandage (Ace  Wrap) if it makes you more comfortable. Wrap it just tight enough to provide light compression, like a new pair of socks feels. Loosen the bandage if you have swelling past the bandage.  Elevation: Raise the injured area above the level of your heart as much as possible in the first 1-2 days.    Use Tylenol  (acetaminophen), Motrin (ibuprofen), or Advil  (ibuprofen) for your pain unless you have an allergy or are told not to use these medications by your provider.  Take the medications as instructed on the package. Tylenol  (acetaminophen) is in many prescription medicines and non-prescription medicines--check all of your medicines to be sure you aren t taking more than 3000 mg per day.  Please follow any other instructions that were discussed with you by your provider.    Stretching/Exercises:  You may have been provided with instructions for stretching or exercises. If your injury was to your arm or shoulder and your provider put you in a sling or an immobilizer, it is important that you take off your immobilizer within 3 days and stretch/move your shoulder, unless your provider specifically tells you to not move your shoulder.  This is to prevent further injury such as a  frozen shoulder .     If you were given a prescription for medicine here today, be sure to read all of the information (including the package insert) that comes with your prescription.  This will include important information about the medicine, its side effects, and any warnings that  you need to know about.  The pharmacist who fills the prescription can provide more information and answer questions you may have about the medicine.  If you have questions or concerns that the pharmacist cannot address, please call or return to the Emergency Department.     Remember that you can always come back to the Emergency Department if you are not able to see your regular provider in the amount of time listed above, if you get any new symptoms, or if there is anything that worries you.

## 2023-09-26 NOTE — ED PROVIDER NOTES
"    History     Chief Complaint:  Shoulder Injury       HPI   Tanner Villatoro is a 68 year old male patient reports that he lost his balance last Thursday and fell.  He tried to catch himself with his right arm.  He denies hitting his head.  Denies any other injuries.  Denies any weakness, numbness or tingling.  States that he has tried Tylenol Profen at home without relief.        Independent Historian:    none    Review of External Notes:  Reviewed    last prescription for Ativan was May    Medications:    oxyCODONE (ROXICODONE) 5 MG tablet  aspirin 81 MG EC tablet  atorvastatin (LIPITOR) 40 MG tablet  doxepin (SINEQUAN) 25 MG capsule  hydrOXYzine (ATARAX) 50 MG tablet  ibuprofen (ADVIL/MOTRIN) 800 MG tablet  irbesartan (AVAPRO) 150 MG tablet  liraglutide (VICTOZA PEN) 18 MG/3ML soln  metFORMIN (GLUCOPHAGE) 500 MG tablet  nicotine (COMMIT) 2 MG lozenge  PHENobarbital (LUMINAL) 32.4 MG tablet  ramelteon (ROZEREM) 8 MG tablet  venlafaxine (EFFEXOR-XR) 150 MG 24 hr capsule        Past Medical History:    Past Medical History:   Diagnosis Date    Anxiety     Coronary artery disease     Diabetes mellitus (H)     Headaches     Heart attack (H)     Hyperlipemia     Hypertension     Insomnia, unspecified     Sleep apnea        Past Surgical History:    Past Surgical History:   Procedure Laterality Date    CARDIAC SURGERY      stent x2    ENDOSCOPIC BALLOON SINUPLASTY ACCLARENT  5/24/2012    Procedure:ENDOSCOPIC BALLOON SINUPLASTY ACCLARENT; BILATERAL ENDOSCOPIC ETHMOIDECTOMY, MAXILLARY ANTROSTOMY, FRONTAL SINUSOSTOMY BILATERAL ; Surgeon:ONI CARRERA; Location:[facility]    GENITOURINARY SURGERY      vasectomy, cyst removal    ORTHOPEDIC SURGERY      ankle cystectomy          Physical Exam   Patient Vitals for the past 24 hrs:   BP Temp Pulse Resp SpO2 Height Weight   09/25/23 1928 (!) 130/97 -- 98 -- 90 % -- --   09/25/23 1630 127/66 97.1  F (36.2  C) 86 16 99 % 1.778 m (5' 10\") 90.7 kg (200 lb)        Physical " Exam  General: Resting on the bed.  Head: No obvious trauma to head.  Ears, Nose, Throat:  External ears normal.  Nose normal.  No pharyngeal erythema, swelling or exudate.  Midline uvula.  Clear TMs  Eyes:  Conjunctivae clear.  Pupils are equal, round, and reactive.   Neck: Normal range of motion.  Neck supple.   CV: Regular rate and rhythm.  No murmurs.      Respiratory: Effort normal and breath sounds normal.  No wheezing or crackles.   Gastrointestinal: Soft.  No distension. There is no tenderness.   Musculoskeletal: Right arm with significant swelling and ecchymosis present.  Tenderness to palpation of the right shoulder.  Nontender elbow and wrist elevation.  2+ pedal pulses.   5-5.  Sensation intact to light touch.  Neuro: Alert. Moving all extremities appropriately.  Normal speech.  Normal gait   Skin: Skin is warm and dry.  No rash noted.       Emergency Department Course     Imaging:  XR Shoulder Right G/E 3 Views   Final Result   IMPRESSION:    1.  Acute transverse fracture of the right humerus surgical neck. Mild medial impaction.   2.  Mild inferior subluxation of the glenohumeral joint, likely secondary to a joint effusion.   3.  Mild acromioclavicular arthrosis.   4.  Incidentally noted scattered degenerative changes in the thoracic spine.        Report per radiology    Laboratory:  Labs Ordered and Resulted from Time of ED Arrival to Time of ED Departure - No data to display     Procedures       Emergency Department Course & Assessments:             Interventions:  Medications   oxyCODONE (ROXICODONE) tablet 5 mg (5 mg Oral $Given 9/25/23 1901)   acetaminophen (TYLENOL) tablet 975 mg (975 mg Oral $Given 9/25/23 1902)        Assessments:  1845 I met with patient, obtained history and performed examination.      Independent Interpretation (X-rays, CTs, rhythm strip):  Reviewed XR able to see fracture    Consultations/Discussion of Management or Tests:  None       Social Determinants of Health  affecting care:  none     Disposition:  The patient was discharged to home.     Impression & Plan        Medical Decision Makin-year-old male presents to the ER with right shoulder pain.  Vitals are reassuring.  Broad differential was pursued including but not limited to fracture, dislocation, sprain strain, nervous injury, DVT, open wound, laceration, etc.  Overall patient is well-appearing nontoxic.  Patient reports that he lost his balance and fell.  Denies any other injuries on head to toe exam.  No syncope or other etiology of fall.  Appears to be mechanical.  There is significant bruising and swelling of the right arm but injury occurred on Thursday and therefore seems most consistent with post fracture swelling.  I have low station for DVT.  Neurovascular intact.  No lacerations or open wounds otherwise noted.  X-ray confirms a fracture.  Patient be placed in a sling.  No occasion for reduction at this time.  Sling and orthopedic follow-up is recommended.  Defer to orthopedics if surgical although alignment seems reasonly well-preserved at this time.  Soft compartments no signs of compartment syndrome.  Pain control with above medications.  Advise follow-up with orthopedics.  Return precautions were discussed and patient was discharged    Diagnosis:    ICD-10-CM    1. Closed nondisplaced fracture of surgical neck of right humerus, unspecified fracture morphology, initial encounter  S42.214A            Discharge Medications:  Discharge Medication List as of 2023  7:23 PM        START taking these medications    Details   oxyCODONE (ROXICODONE) 5 MG tablet Take 1 tablet (5 mg) by mouth every 6 hours as needed for pain, Disp-12 tablet, R-0, E-Prescribe                  2023   No att. providers found              Marimar Juarez MD  [id number]

## 2024-07-16 ENCOUNTER — PATIENT OUTREACH (OUTPATIENT)
Dept: CARE COORDINATION | Facility: CLINIC | Age: 69
End: 2024-07-16
Payer: MEDICARE

## 2024-07-16 ASSESSMENT — ACTIVITIES OF DAILY LIVING (ADL): DEPENDENT_IADLS:: INDEPENDENT

## 2024-07-16 NOTE — PROGRESS NOTES
Clinic Care Coordination Contact  [facility]/Voicemail    Clinical Data: Care Coordinator Outreach: Referral received from PCP indicating that pt may have resource questions about insurance/financial/employment.    Outreach Documentation Number of Outreach Attempt   7/16/2024   9:29 AM 1       Left message on patient's voicemail with call back information and requested return call.    Plan: Care Coordinator will try to reach patient again in 3-5 business days.    Mikaela Santamaria [facility]  Social Work Care Coordinator  Phone: [phone]

## 2024-07-24 NOTE — PROGRESS NOTES
Clinic Care Coordination Contact  [facility]/Voicemail    Clinical Data: Care Coordinator Outreach    Outreach Documentation Number of Outreach Attempt   7/16/2024   9:29 AM 1   7/24/2024   1:16 PM 2       Left message on patient's voicemail with call back information and requested return call.    Plan: Care Coordinator will do no further outreaches at this time.    Mikaela Santamaria [facility]  Social Work Care Coordinator  Phone: [phone]

## (undated) RX ORDER — KETOROLAC TROMETHAMINE 30 MG/ML
INJECTION, SOLUTION INTRAMUSCULAR; INTRAVENOUS
Status: DISPENSED
Start: 2018-04-18

## (undated) RX ORDER — KETOROLAC TROMETHAMINE 30 MG/ML
INJECTION, SOLUTION INTRAMUSCULAR; INTRAVENOUS
Status: DISPENSED
Start: 2018-04-27

## (undated) RX ORDER — KETOROLAC TROMETHAMINE 30 MG/ML
INJECTION, SOLUTION INTRAMUSCULAR; INTRAVENOUS
Status: DISPENSED
Start: 2018-04-25

## (undated) RX ORDER — KETOROLAC TROMETHAMINE 30 MG/ML
INJECTION, SOLUTION INTRAMUSCULAR; INTRAVENOUS
Status: DISPENSED
Start: 2018-04-23

## (undated) RX ORDER — KETOROLAC TROMETHAMINE 30 MG/ML
INJECTION, SOLUTION INTRAMUSCULAR; INTRAVENOUS
Status: DISPENSED
Start: 2018-04-30

## (undated) RX ORDER — KETOROLAC TROMETHAMINE 30 MG/ML
INJECTION, SOLUTION INTRAMUSCULAR; INTRAVENOUS
Status: DISPENSED
Start: 2018-04-20

## (undated) RX ORDER — IPRATROPIUM BROMIDE AND ALBUTEROL SULFATE 2.5; .5 MG/3ML; MG/3ML
SOLUTION RESPIRATORY (INHALATION)
Status: DISPENSED
Start: 2018-04-30